# Patient Record
Sex: FEMALE
[De-identification: names, ages, dates, MRNs, and addresses within clinical notes are randomized per-mention and may not be internally consistent; named-entity substitution may affect disease eponyms.]

---

## 2019-07-19 PROBLEM — Z00.00 ENCOUNTER FOR PREVENTIVE HEALTH EXAMINATION: Status: ACTIVE | Noted: 2019-07-19

## 2022-08-09 ENCOUNTER — APPOINTMENT (OUTPATIENT)
Dept: ORTHOPEDIC SURGERY | Facility: CLINIC | Age: 32
End: 2022-08-09

## 2022-08-09 VITALS — HEIGHT: 64 IN | BODY MASS INDEX: 30.56 KG/M2 | WEIGHT: 179 LBS

## 2022-08-09 DIAGNOSIS — M25.542 PAIN IN JOINTS OF LEFT HAND: ICD-10-CM

## 2022-08-09 PROCEDURE — 99214 OFFICE O/P EST MOD 30 MIN: CPT

## 2022-08-09 NOTE — HISTORY OF PRESENT ILLNESS
[de-identified] : Patient comes in with complaints of pain in the base of her thumb.  She says it has been going on for some time.  She has been taking a vitamin which she says makes her feel better.  She says it use to be close to a 10 and now it is usually a 3.  Sometimes it gets to a 5.  She has a thumb spica brace which she says helps her a bit however it still causes stiffness and it is very large she cannot move her hand.  She does not like to take anti-inflammatories regularly.  She had an MRI done of her thumb.

## 2022-08-09 NOTE — ASSESSMENT
[FreeTextEntry1] :  I believe the patient has some mild basal joint synovitis.  I gave her an AOA thumb brace.  I gave her some support and made her feel better.  She has can continue taking the vitamin that helps her pillar.  She will take Motrin on an as-needed basis.  She is going to continue to modify her home set up.  She will follow up most likely as needed.

## 2022-08-09 NOTE — IMAGING
[de-identified] :   Left hand:  nontender move patient SL LT TFCC tender help patient slightly over basal joint, nontender will patient over MP joint no pain with stressing of UCL RCL, no significant deformities of the thumb, full range of motion of the thumb, camptodactyly small finger

## 2022-12-03 ENCOUNTER — RESULT REVIEW (OUTPATIENT)
Age: 32
End: 2022-12-03

## 2023-05-11 ENCOUNTER — OFFICE VISIT (OUTPATIENT)
Dept: PRIMARY CARE | Facility: CLINIC | Age: 33
End: 2023-05-11
Payer: COMMERCIAL

## 2023-05-11 VITALS
HEART RATE: 109 BPM | SYSTOLIC BLOOD PRESSURE: 128 MMHG | WEIGHT: 249.4 LBS | DIASTOLIC BLOOD PRESSURE: 80 MMHG | HEIGHT: 69 IN | BODY MASS INDEX: 36.94 KG/M2 | TEMPERATURE: 98.7 F

## 2023-05-11 DIAGNOSIS — E87.6 HYPOKALEMIA: ICD-10-CM

## 2023-05-11 DIAGNOSIS — I10 BENIGN ESSENTIAL HTN: ICD-10-CM

## 2023-05-11 DIAGNOSIS — R63.5 WEIGHT GAIN: ICD-10-CM

## 2023-05-11 DIAGNOSIS — D72.829 LEUKOCYTOSIS, UNSPECIFIED TYPE: ICD-10-CM

## 2023-05-11 DIAGNOSIS — E66.01 CLASS 2 SEVERE OBESITY DUE TO EXCESS CALORIES WITH SERIOUS COMORBIDITY AND BODY MASS INDEX (BMI) OF 36.0 TO 36.9 IN ADULT (MULTI): ICD-10-CM

## 2023-05-11 DIAGNOSIS — E55.9 VITAMIN D DEFICIENCY: ICD-10-CM

## 2023-05-11 DIAGNOSIS — Z00.00 ANNUAL PHYSICAL EXAM: Primary | ICD-10-CM

## 2023-05-11 PROBLEM — B39.9 HISTOPLASMOSIS, UNSPECIFIED: Status: ACTIVE | Noted: 2023-05-11

## 2023-05-11 PROBLEM — N89.8 VAGINAL DISCHARGE: Status: ACTIVE | Noted: 2023-05-11

## 2023-05-11 PROBLEM — R94.4 DECREASED GFR: Status: RESOLVED | Noted: 2023-05-11 | Resolved: 2023-05-11

## 2023-05-11 PROBLEM — F41.9 ANXIETY AND DEPRESSION: Status: ACTIVE | Noted: 2023-05-11

## 2023-05-11 PROBLEM — M54.9 MID BACK PAIN, CHRONIC: Status: ACTIVE | Noted: 2023-05-11

## 2023-05-11 PROBLEM — N92.6 IRREGULAR MENSTRUAL CYCLE: Status: ACTIVE | Noted: 2023-05-11

## 2023-05-11 PROBLEM — G93.2 BENIGN INTRACRANIAL HYPERTENSION: Status: ACTIVE | Noted: 2023-05-11

## 2023-05-11 PROBLEM — Z86.79 HISTORY OF HYPERTENSION: Status: RESOLVED | Noted: 2023-05-11 | Resolved: 2023-05-11

## 2023-05-11 PROBLEM — R59.0 CERVICAL LYMPHADENOPATHY: Status: ACTIVE | Noted: 2023-05-11

## 2023-05-11 PROBLEM — R91.1 PULMONARY NODULE SEEN ON IMAGING STUDY: Status: ACTIVE | Noted: 2023-05-11

## 2023-05-11 PROBLEM — N89.8 VAGINAL DISCHARGE: Status: RESOLVED | Noted: 2023-05-11 | Resolved: 2023-05-11

## 2023-05-11 PROBLEM — R00.2 PALPITATIONS: Status: RESOLVED | Noted: 2023-05-11 | Resolved: 2023-05-11

## 2023-05-11 PROBLEM — F32.A ANXIETY AND DEPRESSION: Status: ACTIVE | Noted: 2023-05-11

## 2023-05-11 PROBLEM — R06.83 SNORING: Status: ACTIVE | Noted: 2023-05-11

## 2023-05-11 PROBLEM — J45.909 MILD REACTIVE AIRWAYS DISEASE (HHS-HCC): Status: ACTIVE | Noted: 2023-05-11

## 2023-05-11 PROBLEM — J30.9 ALLERGIC RHINITIS: Status: ACTIVE | Noted: 2023-05-11

## 2023-05-11 PROBLEM — F43.22 ADJUSTMENT DISORDER WITH ANXIETY: Status: ACTIVE | Noted: 2023-05-02

## 2023-05-11 PROBLEM — N93.9 ABNORMAL UTERINE BLEEDING (AUB): Status: ACTIVE | Noted: 2023-05-11

## 2023-05-11 PROBLEM — R94.4 DECREASED GFR: Status: ACTIVE | Noted: 2023-05-11

## 2023-05-11 PROBLEM — R87.810 ASCUS WITH POSITIVE HIGH RISK HPV CERVICAL: Status: ACTIVE | Noted: 2023-05-11

## 2023-05-11 PROBLEM — I35.8 HEART MURMUR, AORTIC: Status: ACTIVE | Noted: 2023-05-11

## 2023-05-11 PROBLEM — R51.9 SINUS HEADACHE: Status: ACTIVE | Noted: 2023-05-11

## 2023-05-11 PROBLEM — R00.2 PALPITATIONS: Status: ACTIVE | Noted: 2023-05-11

## 2023-05-11 PROBLEM — E86.0 DEHYDRATION: Status: ACTIVE | Noted: 2023-05-11

## 2023-05-11 PROBLEM — G47.33 OSA ON CPAP: Status: ACTIVE | Noted: 2023-05-11

## 2023-05-11 PROBLEM — R06.83 SNORING: Status: RESOLVED | Noted: 2023-05-11 | Resolved: 2023-05-11

## 2023-05-11 PROBLEM — M79.89 ARM SWELLING: Status: ACTIVE | Noted: 2023-05-11

## 2023-05-11 PROBLEM — E66.9 OBESITY: Status: ACTIVE | Noted: 2023-05-11

## 2023-05-11 PROBLEM — N18.9 CHRONIC KIDNEY DISEASE: Status: ACTIVE | Noted: 2023-05-11

## 2023-05-11 PROBLEM — R59.0 MEDIASTINAL LYMPHADENOPATHY: Status: ACTIVE | Noted: 2023-05-11

## 2023-05-11 PROBLEM — R60.0 LEG EDEMA, LEFT: Status: ACTIVE | Noted: 2023-05-11

## 2023-05-11 PROBLEM — R00.0 TACHYCARDIA: Status: ACTIVE | Noted: 2023-05-11

## 2023-05-11 PROBLEM — B37.31 YEAST VAGINITIS: Status: ACTIVE | Noted: 2023-05-11

## 2023-05-11 PROBLEM — R87.610 ASCUS WITH POSITIVE HIGH RISK HPV CERVICAL: Status: ACTIVE | Noted: 2023-05-11

## 2023-05-11 PROBLEM — R10.2 FEMALE PELVIC PAIN: Status: ACTIVE | Noted: 2023-05-11

## 2023-05-11 PROBLEM — R06.02 SOB (SHORTNESS OF BREATH) ON EXERTION: Status: ACTIVE | Noted: 2023-05-11

## 2023-05-11 PROBLEM — G89.29 MID BACK PAIN, CHRONIC: Status: ACTIVE | Noted: 2023-05-11

## 2023-05-11 PROBLEM — Z86.79 HISTORY OF HYPERTENSION: Status: ACTIVE | Noted: 2023-05-11

## 2023-05-11 PROBLEM — Q67.8: Status: ACTIVE | Noted: 2023-05-11

## 2023-05-11 PROBLEM — I87.1: Status: ACTIVE | Noted: 2023-05-11

## 2023-05-11 PROCEDURE — 3074F SYST BP LT 130 MM HG: CPT | Performed by: INTERNAL MEDICINE

## 2023-05-11 PROCEDURE — 3075F SYST BP GE 130 - 139MM HG: CPT | Performed by: INTERNAL MEDICINE

## 2023-05-11 PROCEDURE — 3079F DIAST BP 80-89 MM HG: CPT | Performed by: INTERNAL MEDICINE

## 2023-05-11 PROCEDURE — 1036F TOBACCO NON-USER: CPT | Performed by: INTERNAL MEDICINE

## 2023-05-11 PROCEDURE — 3008F BODY MASS INDEX DOCD: CPT | Performed by: INTERNAL MEDICINE

## 2023-05-11 PROCEDURE — 99395 PREV VISIT EST AGE 18-39: CPT | Performed by: INTERNAL MEDICINE

## 2023-05-11 RX ORDER — METOPROLOL SUCCINATE 50 MG/1
50 TABLET, EXTENDED RELEASE ORAL DAILY
COMMUNITY
Start: 2020-01-24 | End: 2023-06-12

## 2023-05-11 RX ORDER — TRIAMTERENE AND HYDROCHLOROTHIAZIDE 75; 50 MG/1; MG/1
1 TABLET ORAL DAILY
COMMUNITY
Start: 2021-07-02 | End: 2023-06-15 | Stop reason: SINTOL

## 2023-05-11 RX ORDER — AMLODIPINE BESYLATE 10 MG/1
10 TABLET ORAL
COMMUNITY
Start: 2017-03-29 | End: 2023-06-23 | Stop reason: SDUPTHER

## 2023-05-11 ASSESSMENT — PATIENT HEALTH QUESTIONNAIRE - PHQ9
2. FEELING DOWN, DEPRESSED OR HOPELESS: NOT AT ALL
1. LITTLE INTEREST OR PLEASURE IN DOING THINGS: NOT AT ALL
SUM OF ALL RESPONSES TO PHQ9 QUESTIONS 1 AND 2: 0
1. LITTLE INTEREST OR PLEASURE IN DOING THINGS: NOT AT ALL
2. FEELING DOWN, DEPRESSED OR HOPELESS: NOT AT ALL
SUM OF ALL RESPONSES TO PHQ9 QUESTIONS 1 AND 2: 0

## 2023-05-11 ASSESSMENT — ENCOUNTER SYMPTOMS
COUGH: 0
CHILLS: 0
PALPITATIONS: 0
POLYDIPSIA: 0
FEVER: 0
SHORTNESS OF BREATH: 0

## 2023-05-11 NOTE — PATIENT INSTRUCTIONS
Please consider the following information on healthy lifestyle choices:  We encourage a diet that is low in refined sugar as well as saturated fats.  Saturated fats are commonly found in fried foods, high fat dairy products like milk creams cheeses and butters, as well as red meat.    The goal for healthy exercise would be to target 100 minutes or more per week of exercise for the sake of exercise.  This can be a mixture of cardiovascular exercise in the form of walking running jogging swimming etc., or strength training exercise.  There are independent benefits of cardiovascular based exercise and reducing the chances of heart disease in a lifetime.    Healthy weight is always a benefit but individuals will have different goals and healthy weight targets.  We want you to be the best version of yourself.  It is important to find a balance between your calories in and your calories out through exercise and lifestyle.  This is difficult and there is no one universal truth here.  The best place to start is oftentimes with figuring out the reality of the calories you eat with a calorie counting fide or assistant tool and finding ways to moderate or control calories in combination with healthy lifestyle choices like exercise.    Staying on top of vaccinations is an important step in long-term preventative health and preventing unnecessary illness.  Please consider any recommendations we discussed in our appointment,    Cancer screening is absolutely paramount to excellent preventative health.  I will work with you to educate you about your options and timeframes for next screening updates.  If we ordered or discussed screening as part of our visit please take those considerations seriously and perform the testing we discussed. .    Please follow up with pulmonology this year about your CT scans.

## 2023-05-11 NOTE — PROGRESS NOTES
"Subjective   Patient ID: Tatiana Prabhakar is a 32 y.o. female who presents for Annual Exam.    Patient presents today for an annual wellness exam    Medical history and surgical history updated today  Medication list reconciled and updated  Patient denies vision issues at this time  Patient denies hearing issues at this time  Current diet: Balanced, but working to reduce calories. Reduce sugary drinks  Current exercise habit: minimal, started back at the gym  Follows with a dentist: Yes    Patient counseled about available preventative health vaccinations and provided with opportunity to update them with our office or through prescription to preferred pharmacy.    Reviewed and discussed preventative health screening recommendations for colon cancer: @44yo    Reviewed and discussed preventative health recommendations for screening for cervical cancer and breast cancer: Recently completed.              Review of Systems   Constitutional:  Negative for chills and fever.   Respiratory:  Negative for cough and shortness of breath.    Cardiovascular:  Negative for chest pain and palpitations.   Endocrine: Negative for polydipsia and polyuria.       Objective   /84   Pulse 109   Temp 37.1 °C (98.7 °F) (Temporal)   Ht 1.753 m (5' 9\")   Wt 113 kg (249 lb 6.4 oz)   BMI 36.83 kg/m²     Physical Exam  Constitutional:       Appearance: Normal appearance.   HENT:      Head: Normocephalic and atraumatic.      Right Ear: Tympanic membrane normal.      Left Ear: Tympanic membrane normal.      Nose: Nose normal.      Mouth/Throat:      Mouth: Mucous membranes are moist.   Eyes:      Extraocular Movements: Extraocular movements intact.      Conjunctiva/sclera: Conjunctivae normal.      Pupils: Pupils are equal, round, and reactive to light.   Neck:      Thyroid: No thyroid mass, thyromegaly or thyroid tenderness.      Vascular: No carotid bruit.   Cardiovascular:      Rate and Rhythm: Normal rate and regular rhythm.      " Heart sounds: No murmur heard.     No friction rub. No gallop.   Pulmonary:      Effort: Pulmonary effort is normal. No respiratory distress.      Breath sounds: No wheezing, rhonchi or rales.   Abdominal:      General: Bowel sounds are normal.      Palpations: Abdomen is soft.      Tenderness: There is no abdominal tenderness. There is no guarding.      Hernia: No hernia is present.   Musculoskeletal:      Cervical back: Neck supple. No tenderness.      Right lower leg: No edema.      Left lower leg: No edema.   Lymphadenopathy:      Cervical: No cervical adenopathy.   Skin:     Coloration: Skin is not jaundiced.   Neurological:      General: No focal deficit present.      Mental Status: She is alert and oriented to person, place, and time.   Psychiatric:         Mood and Affect: Mood normal.         Assessment/Plan   Problem List Items Addressed This Visit          Circulatory    Benign essential HTN    Relevant Orders    Basic metabolic panel    Referral to Comprehensive Weight Loss    Hemoglobin A1C       Endocrine/Metabolic    Obesity    Relevant Orders    Basic metabolic panel    Referral to Comprehensive Weight [facility]    Hemoglobin A1C    Vitamin D deficiency    Relevant Orders    Vitamin D, Total    Weight gain    Relevant Orders    Basic metabolic panel    Referral to [facility] Weight [facility]    Hemoglobin A1C       Hematologic    Leukocytosis    Relevant Orders    Basic metabolic panel    Referral to [facility] Weight [facility]    Hemoglobin A1C       Other    Hypokalemia    Relevant Orders    Basic metabolic panel    Referral to Comprehensive Weight [facility]    Hemoglobin A1C     Other Visit Diagnoses       Annual physical exam    -  Primary    Relevant Orders    Basic metabolic panel    Referral to Comprehensive Weight Loss    Hemoglobin A1C

## 2023-05-19 ENCOUNTER — TELEPHONE (OUTPATIENT)
Dept: PRIMARY CARE | Facility: CLINIC | Age: 33
End: 2023-05-19
Payer: COMMERCIAL

## 2023-05-21 DIAGNOSIS — E66.01 CLASS 2 SEVERE OBESITY DUE TO EXCESS CALORIES WITH SERIOUS COMORBIDITY AND BODY MASS INDEX (BMI) OF 36.0 TO 36.9 IN ADULT (MULTI): Primary | ICD-10-CM

## 2023-06-05 ENCOUNTER — LAB (OUTPATIENT)
Dept: LAB | Facility: LAB | Age: 33
End: 2023-06-05
Payer: COMMERCIAL

## 2023-06-05 DIAGNOSIS — E87.6 HYPOKALEMIA: Primary | ICD-10-CM

## 2023-06-05 DIAGNOSIS — E66.01 CLASS 2 SEVERE OBESITY DUE TO EXCESS CALORIES WITH SERIOUS COMORBIDITY AND BODY MASS INDEX (BMI) OF 36.0 TO 36.9 IN ADULT (MULTI): ICD-10-CM

## 2023-06-05 DIAGNOSIS — E55.9 VITAMIN D DEFICIENCY: ICD-10-CM

## 2023-06-05 DIAGNOSIS — Z00.00 ANNUAL PHYSICAL EXAM: ICD-10-CM

## 2023-06-05 DIAGNOSIS — R63.5 WEIGHT GAIN: ICD-10-CM

## 2023-06-05 DIAGNOSIS — D72.829 LEUKOCYTOSIS, UNSPECIFIED TYPE: ICD-10-CM

## 2023-06-05 DIAGNOSIS — I10 BENIGN ESSENTIAL HTN: ICD-10-CM

## 2023-06-05 DIAGNOSIS — E87.6 HYPOKALEMIA: ICD-10-CM

## 2023-06-05 LAB
ANION GAP IN SER/PLAS: 14 MMOL/L (ref 10–20)
CALCIUM (MG/DL) IN SER/PLAS: 9.3 MG/DL (ref 8.6–10.3)
CARBON DIOXIDE, TOTAL (MMOL/L) IN SER/PLAS: 26 MMOL/L (ref 21–32)
CHLORIDE (MMOL/L) IN SER/PLAS: 99 MMOL/L (ref 98–107)
CREATININE (MG/DL) IN SER/PLAS: 1.05 MG/DL (ref 0.5–1.05)
GFR FEMALE: 71 ML/MIN/1.73M2
GLUCOSE (MG/DL) IN SER/PLAS: 86 MG/DL (ref 74–99)
POTASSIUM (MMOL/L) IN SER/PLAS: 3.3 MMOL/L (ref 3.5–5.3)
SODIUM (MMOL/L) IN SER/PLAS: 136 MMOL/L (ref 136–145)
UREA NITROGEN (MG/DL) IN SER/PLAS: 13 MG/DL (ref 6–23)

## 2023-06-05 PROCEDURE — 82306 VITAMIN D 25 HYDROXY: CPT

## 2023-06-05 PROCEDURE — 80048 BASIC METABOLIC PNL TOTAL CA: CPT

## 2023-06-05 PROCEDURE — 83036 HEMOGLOBIN GLYCOSYLATED A1C: CPT

## 2023-06-05 PROCEDURE — 36415 COLL VENOUS BLD VENIPUNCTURE: CPT

## 2023-06-05 RX ORDER — POTASSIUM CHLORIDE 1500 MG/1
20 TABLET, EXTENDED RELEASE ORAL DAILY
Qty: 30 TABLET | Refills: 0 | Status: SHIPPED | OUTPATIENT
Start: 2023-06-05 | End: 2023-06-15 | Stop reason: ALTCHOICE

## 2023-06-05 NOTE — RESULT ENCOUNTER NOTE
Pt continues to have signficantly low potassium levels. I believe created by one of her BP meds. I want to disicuss a med change please. Please schedule a quick visit for med change visit. Telemed is acceptable. Temporary supply of daily potassium sent in to cover and replace.

## 2023-06-06 LAB
CALCIDIOL (25 OH VITAMIN D3) (NG/ML) IN SER/PLAS: 32 NG/ML
ESTIMATED AVERAGE GLUCOSE FOR HBA1C: 91 MG/DL
HEMOGLOBIN A1C/HEMOGLOBIN TOTAL IN BLOOD: 4.8 %

## 2023-06-12 DIAGNOSIS — I10 ESSENTIAL (PRIMARY) HYPERTENSION: ICD-10-CM

## 2023-06-12 RX ORDER — METOPROLOL SUCCINATE 50 MG/1
TABLET, EXTENDED RELEASE ORAL
Qty: 90 TABLET | Refills: 2 | Status: SHIPPED | OUTPATIENT
Start: 2023-06-12 | End: 2023-08-08 | Stop reason: SDUPTHER

## 2023-06-15 ENCOUNTER — TELEMEDICINE (OUTPATIENT)
Dept: PRIMARY CARE | Facility: CLINIC | Age: 33
End: 2023-06-15
Payer: COMMERCIAL

## 2023-06-15 DIAGNOSIS — I10 BENIGN ESSENTIAL HTN: Primary | ICD-10-CM

## 2023-06-15 PROCEDURE — 99442 PR PHYS/QHP TELEPHONE EVALUATION 11-20 MIN: CPT | Performed by: INTERNAL MEDICINE

## 2023-06-15 RX ORDER — LOSARTAN POTASSIUM 100 MG/1
100 TABLET ORAL DAILY
Qty: 90 TABLET | Refills: 0 | Status: SHIPPED | OUTPATIENT
Start: 2023-06-15 | End: 2023-06-23 | Stop reason: ALTCHOICE

## 2023-06-15 ASSESSMENT — ENCOUNTER SYMPTOMS
COUGH: 0
POLYDIPSIA: 0
FEVER: 0
PALPITATIONS: 0
CHILLS: 0
SHORTNESS OF BREATH: 0

## 2023-06-15 NOTE — PROGRESS NOTES
Subjective   Patient ID: Tatiana Prabhakar is a 32 y.o. female who presents for No chief complaint on file..    32-year-old female presents for follow-up on recurring episodes of hypoglycemia secondary to diuretic therapy for hypertensive management.  She is on triamterene hydrochlorothiazide and the hydrochlorothiazide portion of it continuously results and hypokalemia.  She was directed prior to this visit to discontinue that medication and then schedule follow-up with me to discuss further.  Her visit today is by virtual telephone assessment secondary to patient preference and availability and the patient consents to a visit in this way.  I confirmed that the patient is not going to be having kids in the near future secondary to the presence of a tubal ligation.  In this way I feel the best option would be to discontinue this diuretic therapy and replace it with losartan as our first option with adjustments proceeding from there.  She was directed to schedule close follow-up in the next couple weeks for reassessment of blood pressure as she does not currently have the availability of checking and monitoring blood pressures at home.  I expect to see her back between 2 to 6 weeks for reassessment on hypertension.  She has no complications from the hypokalemia that she is aware of although in the past has experienced significant muscle cramping as a result of it.  This was when her levels were significantly lower than her recent testing.    Total time of assessment medical decision making counseling to the patient and documentation of 12 minutes.         Review of Systems   Constitutional:  Negative for chills and fever.   Respiratory:  Negative for cough and shortness of breath.    Cardiovascular:  Negative for chest pain and palpitations.   Endocrine: Negative for polydipsia and polyuria.       Objective   There were no vitals taken for this visit.    Physical Exam  Constitutional:       Appearance: Normal  appearance.   HENT:      Head: Normocephalic and atraumatic.   Eyes:      Extraocular Movements: Extraocular movements intact.      Pupils: Pupils are equal, round, and reactive to light.   Neck:      Thyroid: No thyroid mass or thyromegaly.      Vascular: No carotid bruit.   Cardiovascular:      Rate and Rhythm: Normal rate and regular rhythm.      Heart sounds: No murmur heard.     No friction rub. No gallop.   Pulmonary:      Effort: No respiratory distress.      Breath sounds: No wheezing, rhonchi or rales.   Musculoskeletal:      Cervical back: Neck supple.      Right lower leg: No edema.      Left lower leg: No edema.   Lymphadenopathy:      Cervical: No cervical adenopathy.   Neurological:      Mental Status: She is alert.         Assessment/Plan   Problem List Items Addressed This Visit       Benign essential HTN - Primary    Relevant Medications    losartan (Cozaar) 100 mg tablet

## 2023-06-16 DIAGNOSIS — I10 ESSENTIAL (PRIMARY) HYPERTENSION: ICD-10-CM

## 2023-06-23 ENCOUNTER — TELEMEDICINE (OUTPATIENT)
Dept: PRIMARY CARE | Facility: CLINIC | Age: 33
End: 2023-06-23
Payer: COMMERCIAL

## 2023-06-23 DIAGNOSIS — I10 PRIMARY HYPERTENSION: Primary | ICD-10-CM

## 2023-06-23 PROCEDURE — 99422 OL DIG E/M SVC 11-20 MIN: CPT | Performed by: INTERNAL MEDICINE

## 2023-06-23 RX ORDER — TRIAMTERENE/HYDROCHLOROTHIAZID 37.5-25 MG
1 TABLET ORAL DAILY
Qty: 90 TABLET | Refills: 1 | Status: SHIPPED | OUTPATIENT
Start: 2023-06-23 | End: 2023-08-08 | Stop reason: SDUPTHER

## 2023-06-23 RX ORDER — AMLODIPINE BESYLATE 10 MG/1
10 TABLET ORAL
Qty: 90 TABLET | Refills: 1 | Status: SHIPPED | OUTPATIENT
Start: 2023-06-23 | End: 2023-08-08 | Stop reason: SDUPTHER

## 2023-07-19 ENCOUNTER — LAB (OUTPATIENT)
Dept: LAB | Facility: LAB | Age: 33
End: 2023-07-19
Payer: COMMERCIAL

## 2023-07-19 DIAGNOSIS — I10 PRIMARY HYPERTENSION: ICD-10-CM

## 2023-07-19 LAB
ANION GAP IN SER/PLAS: 13 MMOL/L (ref 10–20)
CALCIUM (MG/DL) IN SER/PLAS: 8.3 MG/DL (ref 8.6–10.3)
CARBON DIOXIDE, TOTAL (MMOL/L) IN SER/PLAS: 26 MMOL/L (ref 21–32)
CHLORIDE (MMOL/L) IN SER/PLAS: 101 MMOL/L (ref 98–107)
CREATININE (MG/DL) IN SER/PLAS: 1 MG/DL (ref 0.5–1.05)
GFR FEMALE: 75 ML/MIN/1.73M2
GLUCOSE (MG/DL) IN SER/PLAS: 81 MG/DL (ref 74–99)
POTASSIUM (MMOL/L) IN SER/PLAS: 3.6 MMOL/L (ref 3.5–5.3)
SODIUM (MMOL/L) IN SER/PLAS: 136 MMOL/L (ref 136–145)
UREA NITROGEN (MG/DL) IN SER/PLAS: 13 MG/DL (ref 6–23)

## 2023-07-19 PROCEDURE — 80048 BASIC METABOLIC PNL TOTAL CA: CPT

## 2023-07-19 PROCEDURE — 36415 COLL VENOUS BLD VENIPUNCTURE: CPT

## 2023-07-20 ENCOUNTER — TELEPHONE (OUTPATIENT)
Dept: PRIMARY CARE | Facility: CLINIC | Age: 33
End: 2023-07-20
Payer: COMMERCIAL

## 2023-07-20 NOTE — TELEPHONE ENCOUNTER
LM----- Message from Lavell Garcia MD sent at 7/19/2023 10:07 AM EDT -----  Patient's potassium levels and kidney health are very happy with the medication change and are currently very normal.  No need to make further adjustments based on the blood work.

## 2023-07-21 LAB
NIL(NEG) CONTROL SPOT COUNT: NORMAL
PANEL A SPOT COUNT: 0
PANEL B SPOT COUNT: 0
POS CONTROL SPOT COUNT: NORMAL
T-SPOT. TB INTERPRETATION: NEGATIVE

## 2023-08-08 ENCOUNTER — OFFICE VISIT (OUTPATIENT)
Dept: PRIMARY CARE | Facility: CLINIC | Age: 33
End: 2023-08-08
Payer: COMMERCIAL

## 2023-08-08 VITALS
TEMPERATURE: 97.7 F | BODY MASS INDEX: 37.07 KG/M2 | SYSTOLIC BLOOD PRESSURE: 121 MMHG | DIASTOLIC BLOOD PRESSURE: 84 MMHG | WEIGHT: 251 LBS | HEART RATE: 91 BPM

## 2023-08-08 DIAGNOSIS — I10 PRIMARY HYPERTENSION: ICD-10-CM

## 2023-08-08 DIAGNOSIS — I10 BENIGN ESSENTIAL HTN: ICD-10-CM

## 2023-08-08 DIAGNOSIS — R10.2 PELVIC PAIN: ICD-10-CM

## 2023-08-08 DIAGNOSIS — N93.0 PCB (POST COITAL BLEEDING): ICD-10-CM

## 2023-08-08 DIAGNOSIS — I10 ESSENTIAL (PRIMARY) HYPERTENSION: ICD-10-CM

## 2023-08-08 DIAGNOSIS — N93.9 ABNORMAL VAGINAL BLEEDING: Primary | ICD-10-CM

## 2023-08-08 PROCEDURE — 99213 OFFICE O/P EST LOW 20 MIN: CPT | Performed by: INTERNAL MEDICINE

## 2023-08-08 PROCEDURE — 1036F TOBACCO NON-USER: CPT | Performed by: INTERNAL MEDICINE

## 2023-08-08 PROCEDURE — 3079F DIAST BP 80-89 MM HG: CPT | Performed by: INTERNAL MEDICINE

## 2023-08-08 PROCEDURE — 3008F BODY MASS INDEX DOCD: CPT | Performed by: INTERNAL MEDICINE

## 2023-08-08 PROCEDURE — 3074F SYST BP LT 130 MM HG: CPT | Performed by: INTERNAL MEDICINE

## 2023-08-08 RX ORDER — TRIAMTERENE/HYDROCHLOROTHIAZID 37.5-25 MG
1 TABLET ORAL DAILY
Qty: 90 TABLET | Refills: 2 | Status: SHIPPED | OUTPATIENT
Start: 2023-08-08 | End: 2023-12-20

## 2023-08-08 RX ORDER — NORETHINDRONE 5 MG/1
5 TABLET ORAL DAILY
COMMUNITY
Start: 2023-07-20

## 2023-08-08 RX ORDER — METOPROLOL SUCCINATE 50 MG/1
50 TABLET, EXTENDED RELEASE ORAL DAILY
Qty: 90 TABLET | Refills: 2 | Status: SHIPPED | OUTPATIENT
Start: 2023-08-08 | End: 2024-04-10 | Stop reason: SDUPTHER

## 2023-08-08 RX ORDER — AMLODIPINE BESYLATE 10 MG/1
10 TABLET ORAL
Qty: 90 TABLET | Refills: 2 | Status: SHIPPED | OUTPATIENT
Start: 2023-08-08 | End: 2023-12-20

## 2023-08-08 ASSESSMENT — ENCOUNTER SYMPTOMS
PALPITATIONS: 0
CHILLS: 0
SHORTNESS OF BREATH: 0
FEVER: 0
POLYDIPSIA: 0
COUGH: 0

## 2023-08-08 NOTE — PROGRESS NOTES
Subjective   Patient ID: Tatiana Prabhakar is a 33 y.o. female who presents for No chief complaint on file..  33-year-old female presents for hypertension follow-up.  Blood pressure is currently well-controlled on her current medication plan after multiple adjustments.  Her blood work reflects good tolerance and no signs of dehydration or stress on the kidneys.  Medications were refilled as part of today's visit for long-term use.    In addition to the above follow-up the patient had concerns about postcoital irregular vaginal bleeding and discomfort.  She says that this has been happening more more frequently she has already followed up with her OB/GYN about it and had a normal transabdominal and transvaginal ultrasound.  She was placed on a hormone pill which did seem to be helping a bit but she continues to have this abnormal bleeding with intercourse and pain with intercourse.  She has no history of fibroids endometriosis or other chronic pelvic pain history.        Review of Systems   Constitutional:  Negative for chills and fever.   Respiratory:  Negative for cough and shortness of breath.    Cardiovascular:  Negative for chest pain and palpitations.   Endocrine: Negative for polydipsia and polyuria.       Objective   Physical Exam  Constitutional:       Appearance: Normal appearance.   HENT:      Head: Normocephalic and atraumatic.   Eyes:      Extraocular Movements: Extraocular movements intact.      Pupils: Pupils are equal, round, and reactive to light.   Neck:      Thyroid: No thyroid mass or thyromegaly.      Vascular: No carotid bruit.   Cardiovascular:      Rate and Rhythm: Normal rate and regular rhythm.      Heart sounds: No murmur heard.     No friction rub. No gallop.   Pulmonary:      Effort: No respiratory distress.      Breath sounds: No wheezing, rhonchi or rales.   Musculoskeletal:      Cervical back: Neck supple.      Right lower leg: No edema.      Left lower leg: No edema.   Lymphadenopathy:       Cervical: No cervical adenopathy.   Neurological:      Mental Status: She is alert.         Assessment/Plan   Problem List Items Addressed This Visit       Benign essential HTN     Other Visit Diagnoses       Abnormal vaginal bleeding    -  Primary    PCB (post coital bleeding)        Relevant Orders    Referral to Gynecology    Pelvic pain        Relevant Orders    Referral to Gynecology    Primary hypertension        Relevant Medications    amLODIPine (Norvasc) 10 mg tablet    triamterene-hydrochlorothiazid (Maxzide-25) 37.5-25 mg tablet    Essential (primary) hypertension        Relevant Medications    metoprolol succinate XL (Toprol-XL) 50 mg 24 hr tablet

## 2023-08-17 RX ORDER — IBUPROFEN 600 MG/1
600 TABLET ORAL EVERY 6 HOURS PRN
COMMUNITY

## 2023-09-11 RX ORDER — TRIAMTERENE AND HYDROCHLOROTHIAZIDE 75; 50 MG/1; MG/1
TABLET ORAL
Qty: 90 TABLET | Refills: 1 | OUTPATIENT
Start: 2023-09-11

## 2023-09-11 RX ORDER — AMLODIPINE BESYLATE 10 MG/1
TABLET ORAL
Qty: 90 TABLET | OUTPATIENT
Start: 2023-09-11

## 2023-09-15 PROBLEM — G93.2 IIH (IDIOPATHIC INTRACRANIAL HYPERTENSION): Status: ACTIVE | Noted: 2023-09-15

## 2023-09-15 PROBLEM — G93.2 PSEUDOTUMOR CEREBRI: Status: ACTIVE | Noted: 2023-09-15

## 2023-11-07 ENCOUNTER — APPOINTMENT (OUTPATIENT)
Dept: PRIMARY CARE | Facility: CLINIC | Age: 33
End: 2023-11-07
Payer: COMMERCIAL

## 2023-11-15 ENCOUNTER — APPOINTMENT (OUTPATIENT)
Dept: PRIMARY CARE | Facility: CLINIC | Age: 33
End: 2023-11-15
Payer: COMMERCIAL

## 2023-11-16 ENCOUNTER — OFFICE VISIT (OUTPATIENT)
Dept: PRIMARY CARE | Facility: CLINIC | Age: 33
End: 2023-11-16
Payer: COMMERCIAL

## 2023-11-16 VITALS
SYSTOLIC BLOOD PRESSURE: 124 MMHG | BODY MASS INDEX: 36.09 KG/M2 | DIASTOLIC BLOOD PRESSURE: 84 MMHG | TEMPERATURE: 97.5 F | WEIGHT: 244.4 LBS | HEART RATE: 99 BPM

## 2023-11-16 DIAGNOSIS — G47.33 OSA ON CPAP: ICD-10-CM

## 2023-11-16 DIAGNOSIS — I1A.0 RESISTANT HYPERTENSION: Primary | ICD-10-CM

## 2023-11-16 PROBLEM — B39.9 HISTOPLASMOSIS, UNSPECIFIED: Status: RESOLVED | Noted: 2023-05-11 | Resolved: 2023-11-16

## 2023-11-16 PROBLEM — M79.89 ARM SWELLING: Status: RESOLVED | Noted: 2023-05-11 | Resolved: 2023-11-16

## 2023-11-16 PROBLEM — E87.6 HYPOKALEMIA: Status: RESOLVED | Noted: 2023-05-11 | Resolved: 2023-11-16

## 2023-11-16 PROBLEM — R51.9 SINUS HEADACHE: Status: RESOLVED | Noted: 2023-05-11 | Resolved: 2023-11-16

## 2023-11-16 PROBLEM — D72.829 LEUKOCYTOSIS: Status: RESOLVED | Noted: 2023-05-11 | Resolved: 2023-11-16

## 2023-11-16 PROBLEM — E86.0 DEHYDRATION: Status: RESOLVED | Noted: 2023-05-11 | Resolved: 2023-11-16

## 2023-11-16 PROCEDURE — 1036F TOBACCO NON-USER: CPT | Performed by: INTERNAL MEDICINE

## 2023-11-16 PROCEDURE — 99213 OFFICE O/P EST LOW 20 MIN: CPT | Performed by: INTERNAL MEDICINE

## 2023-11-16 PROCEDURE — 3074F SYST BP LT 130 MM HG: CPT | Performed by: INTERNAL MEDICINE

## 2023-11-16 PROCEDURE — 3079F DIAST BP 80-89 MM HG: CPT | Performed by: INTERNAL MEDICINE

## 2023-11-16 PROCEDURE — 3008F BODY MASS INDEX DOCD: CPT | Performed by: INTERNAL MEDICINE

## 2023-11-16 RX ORDER — ACETAMINOPHEN 500 MG
TABLET ORAL
Qty: 1 EACH | Refills: 0 | Status: SHIPPED | OUTPATIENT
Start: 2023-11-16

## 2023-11-16 RX ORDER — ACETAMINOPHEN 500 MG
TABLET ORAL
Qty: 1 EACH | Refills: 0 | Status: SHIPPED | OUTPATIENT
Start: 2023-11-16 | End: 2023-11-16 | Stop reason: SDUPTHER

## 2023-11-16 ASSESSMENT — ENCOUNTER SYMPTOMS
POLYDIPSIA: 0
CHILLS: 0
FEVER: 0
PALPITATIONS: 0
COUGH: 0
SHORTNESS OF BREATH: 0

## 2023-11-16 ASSESSMENT — PAIN SCALES - GENERAL: PAINLEVEL: 0-NO PAIN

## 2023-11-16 NOTE — LETTER
November 16, 2023     Patient: Tatiana Prabhakar   YOB: 1990   Date of Visit: 11/16/2023       To Whom It May Concern:    Tatiana Prabhakar was seen in my clinic on 11/16/2023 at 3:40 pm. Please excuse Tatiana for her absence from work on this day to make the appointment.    If you have any questions or concerns, please don't hesitate to call.         Sincerely,         Lavell Garcia MD        CC: No Recipients

## 2023-11-16 NOTE — PROGRESS NOTES
Subjective   Patient ID: Tatiana Prabhakar is a 33 y.o. female who presents for Follow-up.    Post ED for HTN in setting of acute COVID. 160's systolic. Follow up today normal BP. Good med compliance. Discussed home monitoring.     PT CPAP on recall, pt tossed device due to recall. They sent replacement parts but not machine. No cpap at home now. [facility].          Review of Systems   Constitutional:  Negative for chills and fever.   Respiratory:  Negative for cough and shortness of breath.    Cardiovascular:  Negative for chest pain and palpitations.   Endocrine: Negative for polydipsia and polyuria.       Objective   /84 (BP Location: Left arm, Patient Position: Sitting, BP Cuff Size: Adult)   Pulse 99   Temp 36.4 °C (97.5 °F) (Temporal)   Wt 111 kg (244 lb 6.4 oz)   BMI 36.09 kg/m²     Physical Exam  Constitutional:       Appearance: Normal appearance.   HENT:      Head: Normocephalic and atraumatic.   Eyes:      Extraocular Movements: Extraocular movements intact.      Pupils: Pupils are equal, round, and reactive to light.   Neck:      Thyroid: No thyroid mass or thyromegaly.      Vascular: No carotid bruit.   Cardiovascular:      Rate and Rhythm: Normal rate and regular rhythm.      Heart sounds: No murmur heard.     No friction rub. No gallop.   Pulmonary:      Effort: No respiratory distress.      Breath sounds: No wheezing, rhonchi or rales.   Musculoskeletal:      Cervical back: Neck supple.      Right lower leg: No edema.      Left lower leg: No edema.   Lymphadenopathy:      Cervical: No cervical adenopathy.   Neurological:      Mental Status: She is alert.         Assessment/Plan   Problem List Items Addressed This Visit             ICD-10-CM    MICHELLE on CPAP G47.33     Other Visit Diagnoses         Codes    Resistant hypertension    -  Primary I1A.0    Relevant Medications    blood pressure test kit-large kit                09-Jun-2018 00:00

## 2023-12-20 DIAGNOSIS — I10 PRIMARY HYPERTENSION: ICD-10-CM

## 2023-12-20 RX ORDER — TRIAMTERENE/HYDROCHLOROTHIAZID 37.5-25 MG
1 TABLET ORAL DAILY
Qty: 90 TABLET | Refills: 1 | Status: SHIPPED | OUTPATIENT
Start: 2023-12-20 | End: 2024-04-10 | Stop reason: SDUPTHER

## 2023-12-20 RX ORDER — AMLODIPINE BESYLATE 10 MG/1
10 TABLET ORAL DAILY
Qty: 90 TABLET | Refills: 1 | Status: SHIPPED | OUTPATIENT
Start: 2023-12-20 | End: 2024-04-10 | Stop reason: SDUPTHER

## 2023-12-31 ENCOUNTER — HOSPITAL ENCOUNTER (EMERGENCY)
Facility: HOSPITAL | Age: 33
Discharge: HOME | End: 2023-12-31
Attending: EMERGENCY MEDICINE
Payer: COMMERCIAL

## 2023-12-31 ENCOUNTER — APPOINTMENT (OUTPATIENT)
Dept: CARDIOLOGY | Facility: HOSPITAL | Age: 33
End: 2023-12-31
Payer: COMMERCIAL

## 2023-12-31 ENCOUNTER — APPOINTMENT (OUTPATIENT)
Dept: RADIOLOGY | Facility: HOSPITAL | Age: 33
End: 2023-12-31
Payer: COMMERCIAL

## 2023-12-31 VITALS
HEIGHT: 70 IN | DIASTOLIC BLOOD PRESSURE: 97 MMHG | OXYGEN SATURATION: 97 % | BODY MASS INDEX: 34.36 KG/M2 | HEART RATE: 95 BPM | TEMPERATURE: 98.8 F | WEIGHT: 240 LBS | RESPIRATION RATE: 17 BRPM | SYSTOLIC BLOOD PRESSURE: 140 MMHG

## 2023-12-31 DIAGNOSIS — R00.0 TACHYCARDIA: ICD-10-CM

## 2023-12-31 DIAGNOSIS — R07.9 CHEST PAIN, UNSPECIFIED TYPE: ICD-10-CM

## 2023-12-31 DIAGNOSIS — R00.2 PALPITATIONS: Primary | ICD-10-CM

## 2023-12-31 DIAGNOSIS — E87.6 HYPOKALEMIA: ICD-10-CM

## 2023-12-31 LAB
ALBUMIN SERPL BCP-MCNC: 4.2 G/DL (ref 3.4–5)
ALP SERPL-CCNC: 48 U/L (ref 33–110)
ALT SERPL W P-5'-P-CCNC: 14 U/L (ref 7–45)
ANION GAP SERPL CALC-SCNC: 10 MMOL/L (ref 10–20)
AST SERPL W P-5'-P-CCNC: 12 U/L (ref 9–39)
B-HCG SERPL-ACNC: <2 MIU/ML
BASOPHILS # BLD AUTO: 0.05 X10*3/UL (ref 0–0.1)
BASOPHILS NFR BLD AUTO: 0.7 %
BILIRUB SERPL-MCNC: 0.4 MG/DL (ref 0–1.2)
BUN SERPL-MCNC: 10 MG/DL (ref 6–23)
CALCIUM SERPL-MCNC: 9.2 MG/DL (ref 8.6–10.3)
CARDIAC TROPONIN I PNL SERPL HS: <3 NG/L (ref 0–13)
CHLORIDE SERPL-SCNC: 100 MMOL/L (ref 98–107)
CO2 SERPL-SCNC: 28 MMOL/L (ref 21–32)
CREAT SERPL-MCNC: 1.14 MG/DL (ref 0.5–1.05)
D DIMER PPP FEU-MCNC: 288 NG/ML FEU
EOSINOPHIL # BLD AUTO: 0.14 X10*3/UL (ref 0–0.7)
EOSINOPHIL NFR BLD AUTO: 2 %
ERYTHROCYTE [DISTWIDTH] IN BLOOD BY AUTOMATED COUNT: 12.9 % (ref 11.5–14.5)
FLUAV RNA RESP QL NAA+PROBE: NOT DETECTED
FLUBV RNA RESP QL NAA+PROBE: NOT DETECTED
GFR SERPL CREATININE-BSD FRML MDRD: 65 ML/MIN/1.73M*2
GLUCOSE SERPL-MCNC: 90 MG/DL (ref 74–99)
HCT VFR BLD AUTO: 38.1 % (ref 36–46)
HGB BLD-MCNC: 12.4 G/DL (ref 12–16)
IMM GRANULOCYTES # BLD AUTO: 0.04 X10*3/UL (ref 0–0.7)
IMM GRANULOCYTES NFR BLD AUTO: 0.6 % (ref 0–0.9)
LYMPHOCYTES # BLD AUTO: 1.22 X10*3/UL (ref 1.2–4.8)
LYMPHOCYTES NFR BLD AUTO: 17.4 %
MAGNESIUM SERPL-MCNC: 1.9 MG/DL (ref 1.6–2.4)
MCH RBC QN AUTO: 28.4 PG (ref 26–34)
MCHC RBC AUTO-ENTMCNC: 32.5 G/DL (ref 32–36)
MCV RBC AUTO: 87 FL (ref 80–100)
MONOCYTES # BLD AUTO: 0.33 X10*3/UL (ref 0.1–1)
MONOCYTES NFR BLD AUTO: 4.7 %
NEUTROPHILS # BLD AUTO: 5.23 X10*3/UL (ref 1.2–7.7)
NEUTROPHILS NFR BLD AUTO: 74.6 %
NRBC BLD-RTO: 0 /100 WBCS (ref 0–0)
PLATELET # BLD AUTO: 305 X10*3/UL (ref 150–450)
POTASSIUM SERPL-SCNC: 3.2 MMOL/L (ref 3.5–5.3)
PROT SERPL-MCNC: 7.7 G/DL (ref 6.4–8.2)
RBC # BLD AUTO: 4.36 X10*6/UL (ref 4–5.2)
S PYO DNA THROAT QL NAA+PROBE: NOT DETECTED
SARS-COV-2 RNA RESP QL NAA+PROBE: NOT DETECTED
SODIUM SERPL-SCNC: 135 MMOL/L (ref 136–145)
TSH SERPL-ACNC: 0.97 MIU/L (ref 0.44–3.98)
WBC # BLD AUTO: 7 X10*3/UL (ref 4.4–11.3)

## 2023-12-31 PROCEDURE — 71046 X-RAY EXAM CHEST 2 VIEWS: CPT

## 2023-12-31 PROCEDURE — 84702 CHORIONIC GONADOTROPIN TEST: CPT | Performed by: EMERGENCY MEDICINE

## 2023-12-31 PROCEDURE — 93005 ELECTROCARDIOGRAM TRACING: CPT

## 2023-12-31 PROCEDURE — 87636 SARSCOV2 & INF A&B AMP PRB: CPT | Performed by: EMERGENCY MEDICINE

## 2023-12-31 PROCEDURE — 96361 HYDRATE IV INFUSION ADD-ON: CPT

## 2023-12-31 PROCEDURE — 87651 STREP A DNA AMP PROBE: CPT | Performed by: EMERGENCY MEDICINE

## 2023-12-31 PROCEDURE — 71046 X-RAY EXAM CHEST 2 VIEWS: CPT | Performed by: RADIOLOGY

## 2023-12-31 PROCEDURE — 96374 THER/PROPH/DIAG INJ IV PUSH: CPT

## 2023-12-31 PROCEDURE — 83735 ASSAY OF MAGNESIUM: CPT | Performed by: EMERGENCY MEDICINE

## 2023-12-31 PROCEDURE — 99284 EMERGENCY DEPT VISIT MOD MDM: CPT | Performed by: EMERGENCY MEDICINE

## 2023-12-31 PROCEDURE — 36415 COLL VENOUS BLD VENIPUNCTURE: CPT | Performed by: EMERGENCY MEDICINE

## 2023-12-31 PROCEDURE — 85379 FIBRIN DEGRADATION QUANT: CPT | Performed by: EMERGENCY MEDICINE

## 2023-12-31 PROCEDURE — 84443 ASSAY THYROID STIM HORMONE: CPT | Performed by: EMERGENCY MEDICINE

## 2023-12-31 PROCEDURE — 2500000001 HC RX 250 WO HCPCS SELF ADMINISTERED DRUGS (ALT 637 FOR MEDICARE OP): Performed by: EMERGENCY MEDICINE

## 2023-12-31 PROCEDURE — 84484 ASSAY OF TROPONIN QUANT: CPT | Performed by: EMERGENCY MEDICINE

## 2023-12-31 PROCEDURE — 85025 COMPLETE CBC W/AUTO DIFF WBC: CPT | Performed by: EMERGENCY MEDICINE

## 2023-12-31 PROCEDURE — 2500000004 HC RX 250 GENERAL PHARMACY W/ HCPCS (ALT 636 FOR OP/ED): Performed by: EMERGENCY MEDICINE

## 2023-12-31 PROCEDURE — 80053 COMPREHEN METABOLIC PANEL: CPT | Performed by: EMERGENCY MEDICINE

## 2023-12-31 RX ORDER — NAPROXEN 500 MG/1
500 TABLET ORAL ONCE
Status: COMPLETED | OUTPATIENT
Start: 2023-12-31 | End: 2023-12-31

## 2023-12-31 RX ORDER — KETOROLAC TROMETHAMINE 30 MG/ML
15 INJECTION, SOLUTION INTRAMUSCULAR; INTRAVENOUS ONCE
Status: COMPLETED | OUTPATIENT
Start: 2023-12-31 | End: 2023-12-31

## 2023-12-31 RX ORDER — METOPROLOL SUCCINATE 50 MG/1
50 TABLET, EXTENDED RELEASE ORAL ONCE
Status: COMPLETED | OUTPATIENT
Start: 2023-12-31 | End: 2023-12-31

## 2023-12-31 RX ORDER — METOPROLOL TARTRATE 25 MG/1
TABLET, FILM COATED ORAL ONCE
Status: CANCELLED | OUTPATIENT
Start: 2023-12-31 | End: 2023-12-31

## 2023-12-31 RX ORDER — ACETAMINOPHEN 325 MG/1
975 TABLET ORAL ONCE
Status: COMPLETED | OUTPATIENT
Start: 2023-12-31 | End: 2023-12-31

## 2023-12-31 RX ORDER — POTASSIUM CHLORIDE 20 MEQ/1
40 TABLET, EXTENDED RELEASE ORAL ONCE
Status: COMPLETED | OUTPATIENT
Start: 2023-12-31 | End: 2023-12-31

## 2023-12-31 RX ADMIN — KETOROLAC TROMETHAMINE 15 MG: 30 INJECTION, SOLUTION INTRAMUSCULAR; INTRAVENOUS at 17:58

## 2023-12-31 RX ADMIN — SODIUM CHLORIDE, POTASSIUM CHLORIDE, SODIUM LACTATE AND CALCIUM CHLORIDE 1000 ML: 600; 310; 30; 20 INJECTION, SOLUTION INTRAVENOUS at 17:58

## 2023-12-31 RX ADMIN — ACETAMINOPHEN 975 MG: 325 TABLET ORAL at 21:17

## 2023-12-31 RX ADMIN — NAPROXEN 500 MG: 500 TABLET ORAL at 21:17

## 2023-12-31 RX ADMIN — METOPROLOL SUCCINATE 50 MG: 50 TABLET, EXTENDED RELEASE ORAL at 20:19

## 2023-12-31 RX ADMIN — POTASSIUM CHLORIDE 40 MEQ: 1500 TABLET, EXTENDED RELEASE ORAL at 20:19

## 2023-12-31 ASSESSMENT — COLUMBIA-SUICIDE SEVERITY RATING SCALE - C-SSRS
2. HAVE YOU ACTUALLY HAD ANY THOUGHTS OF KILLING YOURSELF?: NO
6. HAVE YOU EVER DONE ANYTHING, STARTED TO DO ANYTHING, OR PREPARED TO DO ANYTHING TO END YOUR LIFE?: NO
1. IN THE PAST MONTH, HAVE YOU WISHED YOU WERE DEAD OR WISHED YOU COULD GO TO SLEEP AND NOT WAKE UP?: NO

## 2023-12-31 ASSESSMENT — PAIN - FUNCTIONAL ASSESSMENT: PAIN_FUNCTIONAL_ASSESSMENT: 0-10

## 2023-12-31 ASSESSMENT — PAIN SCALES - GENERAL: PAINLEVEL_OUTOF10: 4

## 2023-12-31 ASSESSMENT — PAIN DESCRIPTION - LOCATION: LOCATION: CHEST

## 2023-12-31 ASSESSMENT — PAIN DESCRIPTION - DESCRIPTORS: DESCRIPTORS: PRESSURE;TIGHTNESS

## 2023-12-31 NOTE — ED TRIAGE NOTES
"C/O NON RADIATING MID STERNAL CHEST PAIN/PRESSURE/SORE THROAT, ONSET TODAY, PT DENIES TAKING ONE OF HER HTN MEDICATION BUT STATES \"I TOOK MY OTHER ONES I JUST DIDN'T GET ONE OF THEM FROM THE PHARMACY\" PT TOOK COVID TEST TODAY AT WORK AND STATES IT WAS NEGATIVE, AMBULATING POX 96% 156 HR, PT DENIES NICOTINE BUT USES THC, PT DENIES STIMULANT USE, PT STATES \"I DRANK ONE CUP OF COFFEE EARLIER THIS MORNING\"   "

## 2023-12-31 NOTE — ED PROVIDER NOTES
"CC: Chest Pain     HPI: Tatiana Prabhakar is a 33-year-old female with history including MICHELLE, hypertension, hyperlipidemia, tachycardia for which she's on metoprolol presents to the emergency department for chest pain.  States she developed midsternal, nonradiating chest pain this afternoon around the time she also developed a sore throat, cough, malaise. About an hour prior to this she notes she ate M&Ms and thinks one \"went down the wrong pipe\" but she coughed it up. Also feels like her heart is racing and states that she ran out of her metoprolol yesterday and when she went to  her prescriptions this was the only one that was not filled. Denies any recent change in medications otherwise. Denies fever, chills, vomiting, diarrhea, dyspnea, headache, abdominal pain.    Limitations to History: none  Additional History Obtained from: none    PMHx/PSHx:  Per HPI.     Social History:  - Tobacco:  reports that she has never smoked. She has never used smokeless tobacco.   - Alcohol:  reports current alcohol use.   - Drugs:  reports no history of drug use.     Medications: Reviewed in EMR.     Allergies:  Patient has no known allergies.    ???????????????????????????????????????????????????????????????  Triage Vitals:  T 37.1 °C (98.8 °F)  HR (!) 143  BP (!) 146/102  RR 18  O2 100 %      Physical Exam   Gen: awake, well-appearing, no distress  Eyes: no conjunctival injection or scleral icterus, pupils equal, extraocular movements grossly intact  ENT: nares patent, moist mucous membranes, no posterior oropharyngeal erythema/exudates/edema, uvula midline. No muffled voice or dysphonia  Neck: supple, trachea midline, no masses, no LAD  Heart: tachycardic, regular rhythm, audible heart sounds  Lungs: clear to auscultation bilaterally, no increased work of breathing  Abdomen: soft, nondistended, nontender, no guarding or rebound  Extremities: well-perfused, no edema  Neuro: alert, conversant, no facial asymmetry, speech " fluent, moving all extremities    ???????????????????????????????????????????????????????????????  ED Course/Medical Decision Makin-year-old female with history including MICHELLE, hypertension, hyperlipidemia, tachycardia for which she's on metoprolol presents to the emergency department for chest pain, sore throat, cough, malaise since this afternoon.  Also describing palpitations and notes that she ran out of her metoprolol yesterday.  On arrival she is tachycardic to 140 with otherwise stable vitals.  She is mentating appropriately, is well-perfused, and has no obvious focal findings on exam aside from heart rate.  She was placed on cardiac monitoring.  EKG per my interpretation reveals sinus tachycardia, rate 137, normal axis, normal intervals, no significant ST elevation/depression or T wave abnormalities.  She was given analgesics, IV fluids. Heart rate improved to 110. She was given her home dose of metoprolol with further improvement. I independently reviewed results and these are notable for mild hypokalemia. Troponin and ddimer were normal. Also independently reviewed chest x-ray which is unremarkable. Viral swabs negative but symptoms still suggestive of possible viral etiology. She was given potassium repletion. She remains stable. Results, differential, disposition discussed and she was comfortable with discharge. States her metoprolol should be ready at the pharmacy and does not need another prescription. She was given return precautions.     External records reviewed: outpatient notes   Diagnostic imaging independently reviewed/interpreted by me (as reflected in MDM) includes: EKG, labs, imaging  Discussion of management with other providers: n/a  Escalation of Care: outpatient management appropriate    Disposition: Discharge       Procedures ? SmartLinks last updated 2023 6:01 PM        Lisa Alvarez MD  [phone]       Lisa Alvarez MD  24 [phone]

## 2024-01-01 NOTE — DISCHARGE INSTRUCTIONS
You were seen in the emergency department for chest pain, rapid heart rate, sore throat. Your workup was unremarkable aside from slightly low potassium which was replaced.  Your labs did not show evidence of a blood clot, strain on your heart, or any other abnormalities.  Chest x-ray not show signs of infection.  Your heart rate was fast but this did improve with IV fluids and metoprolol.  You may have been slightly dehydrated and having missed doses of your metoprolol likely contributing to this as well.  Monitor symptoms closely.  Take Tylenol and ibuprofen or naproxen for pain control.  Take your metoprolol every day as prescribed.  If you still have issues getting her metoprolol tomorrow, call your primary care doctor.

## 2024-01-10 LAB
ATRIAL RATE: 137 BPM
P AXIS: 50 DEGREES
P OFFSET: 202 MS
P ONSET: 149 MS
PR INTERVAL: 146 MS
Q ONSET: 222 MS
QRS COUNT: 22 BEATS
QRS DURATION: 74 MS
QT INTERVAL: 288 MS
QTC CALCULATION(BAZETT): 434 MS
QTC FREDERICIA: 379 MS
R AXIS: 14 DEGREES
T AXIS: 58 DEGREES
T OFFSET: 366 MS
VENTRICULAR RATE: 137 BPM

## 2024-01-11 ENCOUNTER — APPOINTMENT (OUTPATIENT)
Dept: OBSTETRICS AND GYNECOLOGY | Facility: CLINIC | Age: 34
End: 2024-01-11
Payer: COMMERCIAL

## 2024-01-25 ENCOUNTER — TELEPHONE (OUTPATIENT)
Dept: PRIMARY CARE | Facility: CLINIC | Age: 34
End: 2024-01-25

## 2024-01-25 NOTE — TELEPHONE ENCOUNTER
Patient had a CPAP Machine has been recalled and and was needs a new order for a cpap. Her company [employer] is only asking for demo and a new order.     Fax [phone]

## 2024-01-31 ENCOUNTER — OFFICE VISIT (OUTPATIENT)
Dept: OBSTETRICS AND GYNECOLOGY | Facility: CLINIC | Age: 34
End: 2024-01-31
Payer: COMMERCIAL

## 2024-01-31 ENCOUNTER — PREP FOR PROCEDURE (OUTPATIENT)
Dept: OBSTETRICS AND GYNECOLOGY | Facility: CLINIC | Age: 34
End: 2024-01-31

## 2024-01-31 VITALS
BODY MASS INDEX: 36.29 KG/M2 | WEIGHT: 245 LBS | HEART RATE: 73 BPM | SYSTOLIC BLOOD PRESSURE: 124 MMHG | HEIGHT: 69 IN | DIASTOLIC BLOOD PRESSURE: 80 MMHG

## 2024-01-31 DIAGNOSIS — N39.3 SUI (STRESS URINARY INCONTINENCE, FEMALE): ICD-10-CM

## 2024-01-31 DIAGNOSIS — N93.9 ABNORMAL UTERINE BLEEDING (AUB): Primary | ICD-10-CM

## 2024-01-31 DIAGNOSIS — N93.9 ABNORMAL UTERINE BLEEDING (AUB): ICD-10-CM

## 2024-01-31 DIAGNOSIS — Z98.51 H/O TUBAL LIGATION: ICD-10-CM

## 2024-01-31 DIAGNOSIS — N81.4 UTEROVAGINAL PROLAPSE: ICD-10-CM

## 2024-01-31 DIAGNOSIS — N81.6 POP-Q STAGE 2 RECTOCELE: ICD-10-CM

## 2024-01-31 DIAGNOSIS — N81.10 POP-Q STAGE 2 CYSTOCELE: Primary | ICD-10-CM

## 2024-01-31 PROCEDURE — 99205 OFFICE O/P NEW HI 60 MIN: CPT | Performed by: OBSTETRICS & GYNECOLOGY

## 2024-01-31 PROCEDURE — 51798 US URINE CAPACITY MEASURE: CPT | Performed by: OBSTETRICS & GYNECOLOGY

## 2024-01-31 PROCEDURE — 3074F SYST BP LT 130 MM HG: CPT | Performed by: OBSTETRICS & GYNECOLOGY

## 2024-01-31 PROCEDURE — 3008F BODY MASS INDEX DOCD: CPT | Performed by: OBSTETRICS & GYNECOLOGY

## 2024-01-31 PROCEDURE — 3079F DIAST BP 80-89 MM HG: CPT | Performed by: OBSTETRICS & GYNECOLOGY

## 2024-01-31 PROCEDURE — 99215 OFFICE O/P EST HI 40 MIN: CPT | Performed by: OBSTETRICS & GYNECOLOGY

## 2024-01-31 PROCEDURE — 1036F TOBACCO NON-USER: CPT | Performed by: OBSTETRICS & GYNECOLOGY

## 2024-01-31 RX ORDER — METRONIDAZOLE 500 MG/100ML
500 INJECTION, SOLUTION INTRAVENOUS EVERY 8 HOURS
Status: CANCELLED | OUTPATIENT
Start: 2024-01-31

## 2024-01-31 RX ORDER — ACETAMINOPHEN 325 MG/1
975 TABLET ORAL ONCE
Status: CANCELLED | OUTPATIENT
Start: 2024-01-31 | End: 2024-01-31

## 2024-01-31 RX ORDER — CELECOXIB 400 MG/1
400 CAPSULE ORAL ONCE
Status: CANCELLED | OUTPATIENT
Start: 2024-01-31 | End: 2024-01-31

## 2024-01-31 RX ORDER — PHENAZOPYRIDINE HYDROCHLORIDE 200 MG/1
200 TABLET, FILM COATED ORAL ONCE
Status: CANCELLED | OUTPATIENT
Start: 2024-01-31 | End: 2024-01-31

## 2024-01-31 RX ORDER — HEPARIN SODIUM 5000 [USP'U]/ML
5000 INJECTION, SOLUTION INTRAVENOUS; SUBCUTANEOUS ONCE
Status: CANCELLED | OUTPATIENT
Start: 2024-01-31 | End: 2024-01-31

## 2024-01-31 ASSESSMENT — ENCOUNTER SYMPTOMS
CONSTITUTIONAL NEGATIVE: 1
GASTROINTESTINAL NEGATIVE: 1
RESPIRATORY NEGATIVE: 1
PSYCHIATRIC NEGATIVE: 1
NEUROLOGICAL NEGATIVE: 1
CARDIOVASCULAR NEGATIVE: 1
EYES NEGATIVE: 1
ENDOCRINE NEGATIVE: 1
MUSCULOSKELETAL NEGATIVE: 1

## 2024-01-31 ASSESSMENT — PAIN SCALES - GENERAL: PAINLEVEL: 6

## 2024-02-01 ENCOUNTER — HOSPITAL ENCOUNTER (OUTPATIENT)
Facility: HOSPITAL | Age: 34
Setting detail: OUTPATIENT SURGERY
End: 2024-02-01
Attending: OBSTETRICS & GYNECOLOGY | Admitting: OBSTETRICS & GYNECOLOGY
Payer: COMMERCIAL

## 2024-02-01 PROBLEM — N81.4 UTEROVAGINAL PROLAPSE: Status: ACTIVE | Noted: 2024-01-31

## 2024-02-06 ENCOUNTER — TELEPHONE (OUTPATIENT)
Dept: OBSTETRICS AND GYNECOLOGY | Facility: CLINIC | Age: 34
End: 2024-02-06
Payer: COMMERCIAL

## 2024-02-06 NOTE — TELEPHONE ENCOUNTER
I spoke with patient on 2/1/2024 to discuss scheduling surgery with Dr. Metz at [facility]. Patient agrees with date of 4/2/2024 for a total vaginal hysterectomy, bilateral salpingectomy, uterosacral ligament suspension, anterior and posterior repairs, possible mid-urethral sling and cystoscopy. PAT has been ordered.

## 2024-02-07 ENCOUNTER — PROCEDURE VISIT (OUTPATIENT)
Dept: OBSTETRICS AND GYNECOLOGY | Facility: CLINIC | Age: 34
End: 2024-02-07
Payer: COMMERCIAL

## 2024-02-07 DIAGNOSIS — N39.3 SUI (STRESS URINARY INCONTINENCE, FEMALE): ICD-10-CM

## 2024-02-07 PROCEDURE — 51741 ELECTRO-UROFLOWMETRY FIRST: CPT | Performed by: OBSTETRICS & GYNECOLOGY

## 2024-02-07 PROCEDURE — 51729 CYSTOMETROGRAM W/VP&UP: CPT | Performed by: OBSTETRICS & GYNECOLOGY

## 2024-02-07 PROCEDURE — 51797 INTRAABDOMINAL PRESSURE TEST: CPT | Performed by: OBSTETRICS & GYNECOLOGY

## 2024-02-07 PROCEDURE — 51784 ANAL/URINARY MUSCLE STUDY: CPT | Performed by: OBSTETRICS & GYNECOLOGY

## 2024-02-07 NOTE — PROGRESS NOTES
Patient ID: Tatiana Prabhakar is a 33 y.o. female.    Uroflowmetry    Date/Time: 2/7/2024 10:37 AM    Performed by: Lisa Mohr MA  Authorized by: Remedios Metz MD MPH    Procedure discussed: discussed risks, benefits and alternatives    Chaperone present: no    Timeout: timeout called immediately prior to procedure    Prep: patient was prepped and draped in usual sterile fashion    Position: sitting    Procedure Details     Procedure: CMG with UPP/voiding pressures, EMG, intra-abdominal voiding study and uroflow      CMG with UPP/Voiding Pressures Details:     First urge to void (mL): 98    Urgency to void (mL): 193    Bladder capacity (mL): 254    Uroflow Details:     Pre-void volume (mL): 60.8    Voiding duration (sec): 8.6    Average flow rate (mL/sec): 7.7    Max flow rate (mL/sec): 14.1    Voided urine (mL): 277    Residual urine (mL): 0    Post-Procedure Details     Outcome: patient tolerated procedure well with no complications      Additional Details      No stress noted on UDS          Urodynamics Evaluation Documentation      HPI/Indication for UDS: 33 year old female with asymptomatic stage 2 cystocele with some uterine descent, AUB, ARIS, and pelvic pain. Comorbidities include: MICHELLE, HTN, HLD, and tachycardia.      Diagnoses:  #1 Abnormal uterine bleeding  #2 Pelvic pain  #3 Cystocele with some uterine descent, stage 2  #4 Stress urinary incontinence      Plan:  1. AUB  - We reviewed her pelvic U/S results from July 2023 which showed a normal sized uterus at 9cm and a normal endometrial thickness of 8mm.   - Given that her AUB is not well-managed on po Norethindrone we recommended either continuing po Aygestin, consideration of IUD placement, other OCPs, or endometrial sampling (I.e. in office EMB). If her EMB results are normal/benign then we recommend placing an IUD to help manage AUB. If she is not interested in IUD placement the only other option to definitively manage AUB would be to consider a  vaginal vs. lx hysterectomy; of note, she has completed child bearing as she had a BL tubal ligation x7 years ago.   - She is very interested in pursuing a hysterectomy.   - Plan to schedule an EMB prior to scheduling TVH + USLS and +/- APR. We advised her to take 600-800mg of Ibuprofen/Advil the morning prior to her EMB.      2. Cystocele with some uterine descent, stage 2  - Asymptomatic   - If she is considering pursuing a hysterectomy we would consider pursuing of POP repair (I.e. USLS + TVH).   - Gave her PI handout on the USLS to review and consider.   - Case request sent for vaginal USLS, TVH, +/- APR, possible midurethral sling, and cystoscopy with Dr. Remedios Metz.      3. ARIS  - We discussed that in order to correctly treat her urinary issues she will need UDS to fully assess/diagnose the type of urinary problems she is having (i.e., ARIS vs. ISD vs. OAB). Urodynamics assesses bladder function and capacity, voiding/filling patterns, urethral strength, assess if there is NARAYANAN, MUCP, etc.   - Counseled on the importance of UDS to evaluate if there will be occult ARIS after POP repair and if there is we would offer her an anti-incontinence procedure at the time of her POP repair (i.e. midurethral sling).  - Gave her PI handout on the TVT to review and consider.      Preop planning  - will need PAT  - will need UDS  - EMB scheduled    UDS  Calibrated electronic equipment was used throughout testing.    12843 Complex Urinary Flow Study:   Maximum flow: 14 ml/s  Voiding time: of 8 sec   Voided volume of 60 cc.    61997 PVR by ultrasound: 10 ml    34987 Complex cystometrogram with  and UPP-Simultaneous measurement of intraabdominal pressure using a vaginal/rectal catheter and bladder pressure using a urethral catheter. Cystometry was done.    Fill rate: 100 ml/min  First sensation occurred at 98 ml at a detrusor pressure of 01 cm H2O   Strong desire to void occurred at 193 ml with a detrusor pressure of -2 cm  H2O.   Maximum capacity of 254 ml with a detrusor pressure of -3 cm H2O.  DO: none seen  ARIS: none seen    Urethral Pressure Profile:   MUCP was 94 cm of water  Functional urethral length of 1.8 cm.     Pressure Flow Study 35900 Voiding pressure studies ()? intraabdominal voiding pressure (AP)  A voiding pressure study was completed and performed utilizing both bladder and rectal catheters for detrusor, vesical, and abdominal pressure measurement.    Maximum flow rate: 21 ml/s  Average flow rate: 9 ml/s  Pdet at max flow: -3 cm H2)  Maximum detrusor pressure of 1 cm H2O     74427 EMG of anal or urethral sphincter, other than needle, any technique  Electromyography: Provocative maneuvers produced appropriate changes in waveforms.     Summary  Normal capacity and compliance, no aris or do. Voids via valsalva mechanism. Concern for acontractile bladder. No anti-incontinence procedure indicated based on this study but will re-discuss as she reports ARIS at home.     The printed report of the above testing will be scanned into the chart and can be found in the Media tab.

## 2024-02-22 ENCOUNTER — OFFICE VISIT (OUTPATIENT)
Dept: OBSTETRICS AND GYNECOLOGY | Facility: CLINIC | Age: 34
End: 2024-02-22
Payer: COMMERCIAL

## 2024-02-22 VITALS
DIASTOLIC BLOOD PRESSURE: 84 MMHG | WEIGHT: 247 LBS | SYSTOLIC BLOOD PRESSURE: 123 MMHG | BODY MASS INDEX: 36.58 KG/M2 | HEIGHT: 69 IN | HEART RATE: 105 BPM

## 2024-02-22 DIAGNOSIS — N93.9 ABNORMAL UTERINE BLEEDING (AUB): Primary | ICD-10-CM

## 2024-02-22 DIAGNOSIS — Z92.89 HISTORY OF ENDOMETRIAL BIOPSY: ICD-10-CM

## 2024-02-22 PROCEDURE — 1036F TOBACCO NON-USER: CPT | Performed by: OBSTETRICS & GYNECOLOGY

## 2024-02-22 PROCEDURE — 58100 BIOPSY OF UTERUS LINING: CPT | Performed by: OBSTETRICS & GYNECOLOGY

## 2024-02-22 PROCEDURE — 88305 TISSUE EXAM BY PATHOLOGIST: CPT | Performed by: PATHOLOGY

## 2024-02-22 PROCEDURE — 3074F SYST BP LT 130 MM HG: CPT | Performed by: OBSTETRICS & GYNECOLOGY

## 2024-02-22 PROCEDURE — 99213 OFFICE O/P EST LOW 20 MIN: CPT | Performed by: OBSTETRICS & GYNECOLOGY

## 2024-02-22 PROCEDURE — 3079F DIAST BP 80-89 MM HG: CPT | Performed by: OBSTETRICS & GYNECOLOGY

## 2024-02-22 PROCEDURE — 88305 TISSUE EXAM BY PATHOLOGIST: CPT | Mod: TC,SUR | Performed by: OBSTETRICS & GYNECOLOGY

## 2024-02-22 PROCEDURE — 3008F BODY MASS INDEX DOCD: CPT | Performed by: OBSTETRICS & GYNECOLOGY

## 2024-02-22 ASSESSMENT — ENCOUNTER SYMPTOMS
NEUROLOGICAL NEGATIVE: 1
MUSCULOSKELETAL NEGATIVE: 1
CARDIOVASCULAR NEGATIVE: 1
CONSTITUTIONAL NEGATIVE: 1
PSYCHIATRIC NEGATIVE: 1
EYES NEGATIVE: 1
ENDOCRINE NEGATIVE: 1
RESPIRATORY NEGATIVE: 1
GASTROINTESTINAL NEGATIVE: 1

## 2024-02-22 ASSESSMENT — PAIN SCALES - GENERAL: PAINLEVEL: 0-NO PAIN

## 2024-02-23 NOTE — PROGRESS NOTES
[facility] Department of Urogynecology   Remedios Metz MD, MPH   [phone]    ASSESSMENT AND PLAN:   33 year old female with asymptomatic stage 2 cystocele with some uterine descent, AUB, ARIS, and pelvic pain. Comorbidities include: MICHELLE, HTN, HLD, and tachycardia.      Diagnoses:  #1 Abnormal uterine bleeding  #2 Cystocele with some uterine descent, stage 2  #3 Stress urinary incontinence     Plan:  1. AUB  - The patient notes resolution of her vaginal bleeding 2 weeks ago since discontinuing Norethindrone. She would like to reconsider the hysterectomy. She is not ready to cancel yet but is considering cancellation  - She would like to proceed with the EMB today to rule out any neoplastic process/etiology of her previous AUB.   - We reviewed the risks and benefits of the EMB procedure. She was advised her to take Ibuprofen prior to and after the procedure for comfort measures. Patient verbalized her understanding and signed the informed consent today prior to proceeding with the EMB.   - EMB performed today to evaluate endometrium and collected adequate sample size with good hemostasis obtained.     2. Cystocele with some uterine descent (stage 2), ARIS  - We reviewed her UDS results which did not demonstrate DO or ARIS.   - After discussion she does not experience large volume ARIS at baseline but rather ARIS with a full bladder + hard cough 1-2x per year; will defer anti-incontinence procedure.   - Patient might cancel OR date of 4/2/2024 if EMB pathology is benign for the previously scheduled vaginal USLS, +/- APR, and cystoscopy with Dr. Remedios Metz. We encouraged her to let us know at least 2 weeks in advance if she wishes to cancel her surgery date.     Follow up as needed with Dr. Remedios Metz.     [employer] Attestation  By signing my name below, Nitin MCFARLANE Scribe, attest that this documentation has been prepared under the direction and in the presence of Remedios Metz MD MPH on 02/22/2024  at 9:23 PM.     Problem List Items Addressed This Visit          Genitourinary and Reproductive    Abnormal uterine bleeding (AUB) - Primary    Relevant Orders    Surgical Pathology Consultation     Other Visit Diagnoses       History of endometrial biopsy        Relevant Orders    Surgical Pathology Consultation           I spent a total of 20 minutes in face to face and non face to face time.      Remedios Metz MD, MPH, FACOG     Established    HISTORY OF PRESENT ILLNESS:   33 year old female presenting for EMB to evaluate AUB prior to consideration of POP repair surgery.     Record Review:   - 2/7/2024 UDS results: No DO or ARIS. No anti-incontinence procedure is indicated but will rediscuss since she reports ARIS at home.   - 1/31/2024 Dr. Metz note: AUB - Normal sized uterus of 9cm and normal endometrial thickness of 8mm on pelvic U/S from July 2023. Given that her AUB is not well-managed on po Norethindrone we recommended endometrial sampling prior to consideration of hysterectomy at the time of her POP repair. Stage 2 cystocele with uterine descent -Placed a case request for vaginal USLS, TVH, +/- APR, possible midurethral sling, and cystoscopy. ARIS - Ordered UDS.     Prolapse Symptoms;  - Patient is wanting to defer POP repair surgery at this time.     Urinary Symptoms:   - She endorses ARIS with a very full bladder and a hard sneeze; ARIS occurs 1-2x per year.     Vaginal Symptoms:  - Her vaginal bleeding discontinued 2 weeks ago after discontinuing po Norethindrone. She would not like to proceed with the hysterectomy at the time of her POP repair.       Past Medical History:     Past Medical History:   Diagnosis Date    Blepharochalasis left eye, unspecified eyelid 11/19/2018    Blepharochalasis of left eye    Blepharochalasis left upper eyelid 04/07/2017    Blepharochalasis left upper eyelid    Body mass index (BMI) 38.0-38.9, adult 12/29/2016    BMI 38.0-38.9,adult    Chronic sinusitis, unspecified  12/18/2017    Other sinusitis    Edema of left upper eyelid 04/07/2017    Edema of left upper eyelid    Encounter for gynecological examination (general) (routine) without abnormal findings 05/18/2017    Encounter for annual routine gynecological examination    Encounter for routine postpartum follow-up 02/07/2017    Postpartum care following vaginal delivery    Essential (primary) hypertension 02/07/2017    Benign essential hypertension    Impacted cerumen, left ear 10/13/2017    Impacted cerumen of left ear    Localized edema 10/30/2014    Periorbital edema    Low back pain, unspecified 04/18/2018    Dorsalgia of lumbar region    Obstructive sleep apnea (adult) (pediatric) 10/01/2019    Obstructive sleep apnea of adult    Obstructive sleep apnea (adult) (pediatric) 12/04/2014    Obstructive sleep apnea    Other conditions influencing health status 04/18/2018    Excessive mucus secretion    Other conditions influencing health status     History of recent childbirth    Personal history of other diseases of the circulatory system 10/28/2015    History of cardiac murmur    Personal history of other diseases of the nervous system and sense organs 04/07/2017    History of papilledema    Personal history of other diseases of the nervous system and sense organs 09/23/2015    History of papilledema    Personal history of other diseases of the respiratory system 06/02/2020    History of allergic rhinitis    Personal history of other endocrine, nutritional and metabolic disease 10/30/2015    History of hypokalemia    Personal history of other specified conditions 08/01/2016    History of headache    Personal history of other specified conditions 04/07/2017    History of headache    Personal history of other specified conditions 05/18/2017    History of abdominal pain    Polyp of cervix uteri 03/17/2016    Cervical polyp    Pure hypercholesterolemia, unspecified 02/20/2015    Low-density-lipoid-type (LDL) hyperlipoproteinemia     Scotoma of blind spot area, left eye 04/07/2017    Enlarged blind spot of left eye    Supervision of pregnancy with other poor reproductive or obstetric history, first trimester 12/29/2016    History of pre-eclampsia in prior pregnancy, currently pregnant, first trimester    Unspecified acute lower respiratory infection 12/18/2017    Lower respiratory infection (e.g., bronchitis, pneumonia, pneumonitis, pulmonitis)    Unspecified asthma, uncomplicated 06/02/2020    Asthmatic bronchitis    Unspecified visual disturbance 04/07/2017    Transient vision disturbance        Past Surgical History:     Past Surgical History:   Procedure Laterality Date    OTHER SURGICAL HISTORY  02/07/2017    Tubal Ligation After Vaginal Delivery (Same Hospitalization)       Medications:     Prior to Admission medications    Medication Sig Start Date End Date Taking? Authorizing Provider   amLODIPine (Norvasc) 10 mg tablet TAKE 1 TABLET BY MOUTH EVERY DAY 12/20/23  Yes Lavell Garcia MD   blood pressure test kit-large kit 1 device used daily for blood pressure monitoring 11/16/23  Yes Lavell Garcia MD   ibuprofen 600 mg tablet Take 1 tablet (600 mg) by mouth every 6 hours if needed for mild pain (1 - 3).   Yes Historical Provider, MD   metoprolol succinate XL (Toprol-XL) 50 mg 24 hr tablet Take 1 tablet (50 mg) by mouth once daily. Do not crush or chew. 8/8/23  Yes Lavell Garcia MD   norethindrone (Aygestin) 5 mg tablet Take 1 tablet (5 mg) by mouth once daily. 7/20/23  Yes Historical Provider, MD   triamterene-hydrochlorothiazid (Maxzide-25) 37.5-25 mg tablet TAKE 1 TABLET BY MOUTH EVERY DAY 12/20/23  Yes Lavell Garcia MD        ROS  Review of Systems   Constitutional: Negative.    HENT: Negative.     Eyes: Negative.    Respiratory: Negative.     Cardiovascular: Negative.    Gastrointestinal: Negative.    Endocrine: Negative.    Genitourinary: Negative.    Musculoskeletal: Negative.    Neurological: Negative.   "  Psychiatric/Behavioral: Negative.            PHYSICAL EXAM:    /84   Pulse 105   Ht 1.753 m (5' 9\")   Wt 112 kg (247 lb)   BMI 36.48 kg/m²   No LMP recorded.    Declines chaperone for physical exam.      Well developed, well nourished, in no apparent distress.   Neurologic/Psychiatric:  Awake, Alert and Oriented times 3.  Affect normal.     GENITAL/URINARY:     External Genitalia:  The patient has normal appearing external genitalia, normal skenes and bartholins glands, and a normal hair distribution.  Her vulva is without lesions, erythema or discharge.  It is non-tender with appropriate sensation.     Urethral Meatus:  Size normal, Location normal, Lesions absent, Prolapse absent,      Urethra:  Fullness absent, Masses absent,      Bladder:  Fullness absent, Masses absent, Tenderness absent,      Vagina:  General appearance normal, Estrogen effect normal, Discharge absent, Lesions absent.     Cervix: Normal, no discharge.     PROCEDURE:   Patient ID: Tatiana Prabhakar is a 33 y.o. female.    Endometrial biopsy    Date/Time: 2/22/2024 9:32 PM    Performed by: Remedios Metz MD MPH  Authorized by: Remedios Metz MD MPH    Consent:     Consent obtained: written and verbal    Consent given by: patient    Risks discussed: bleeding and pain    Alternatives discussed: alternative treatment    Patient agrees, verbalizes understanding, and wants to proceed: yes      Procedure explained and questions answered to patient or proxy's satisfaction: yes    Indications:     Indications: abnormal uterine bleeding    Pre-procedure:     Premeds: NSAID    Procedure:     A bimanual exam was performed: yes      Prepped with: Betadine    Tenaculum used: yes    Findings:     Cervix: normal      Specimen collected: specimen collected and sent to pathology      Patient tolerance: tolerated well, no immediate complications  Comments:     Procedure comments: No bleeding, hemostasis obtained.       DATA AND DIAGNOSTIC STUDIES " REVIEWED   Lab Results   Component Value Date    GLUCOSE 90 12/31/2023    CALCIUM 9.2 12/31/2023     (L) 12/31/2023    K 3.2 (L) 12/31/2023    CO2 28 12/31/2023     12/31/2023    BUN 10 12/31/2023    CREATININE 1.14 (H) 12/31/2023     Lab Results   Component Value Date    WBC 7.0 12/31/2023    HGB 12.4 12/31/2023    HCT 38.1 12/31/2023    MCV 87 12/31/2023     12/31/2023

## 2024-02-28 LAB
LABORATORY COMMENT REPORT: NORMAL
PATH REPORT.FINAL DX SPEC: NORMAL
PATH REPORT.GROSS SPEC: NORMAL
PATH REPORT.RELEVANT HX SPEC: NORMAL
PATH REPORT.TOTAL CANCER: NORMAL

## 2024-03-15 ENCOUNTER — OFFICE VISIT (OUTPATIENT)
Dept: OBSTETRICS AND GYNECOLOGY | Facility: CLINIC | Age: 34
End: 2024-03-15
Payer: COMMERCIAL

## 2024-03-15 VITALS — SYSTOLIC BLOOD PRESSURE: 115 MMHG | DIASTOLIC BLOOD PRESSURE: 78 MMHG | HEART RATE: 88 BPM

## 2024-03-15 DIAGNOSIS — N93.9 ABNORMAL UTERINE BLEEDING (AUB): ICD-10-CM

## 2024-03-15 DIAGNOSIS — B37.31 YEAST VAGINITIS: ICD-10-CM

## 2024-03-15 DIAGNOSIS — B96.89 BV (BACTERIAL VAGINOSIS): Primary | ICD-10-CM

## 2024-03-15 DIAGNOSIS — N76.0 BV (BACTERIAL VAGINOSIS): Primary | ICD-10-CM

## 2024-03-15 PROCEDURE — 3008F BODY MASS INDEX DOCD: CPT | Performed by: OBSTETRICS & GYNECOLOGY

## 2024-03-15 PROCEDURE — 1036F TOBACCO NON-USER: CPT | Performed by: OBSTETRICS & GYNECOLOGY

## 2024-03-15 PROCEDURE — 99213 OFFICE O/P EST LOW 20 MIN: CPT | Performed by: OBSTETRICS & GYNECOLOGY

## 2024-03-15 PROCEDURE — 87205 SMEAR GRAM STAIN: CPT | Performed by: OBSTETRICS & GYNECOLOGY

## 2024-03-15 PROCEDURE — 3074F SYST BP LT 130 MM HG: CPT | Performed by: OBSTETRICS & GYNECOLOGY

## 2024-03-15 PROCEDURE — 3078F DIAST BP <80 MM HG: CPT | Performed by: OBSTETRICS & GYNECOLOGY

## 2024-03-15 ASSESSMENT — ENCOUNTER SYMPTOMS
ENDOCRINE NEGATIVE: 1
EYES NEGATIVE: 1
CARDIOVASCULAR NEGATIVE: 1
MUSCULOSKELETAL NEGATIVE: 1
PSYCHIATRIC NEGATIVE: 1
CONSTITUTIONAL NEGATIVE: 1
RESPIRATORY NEGATIVE: 1
GASTROINTESTINAL NEGATIVE: 1
NEUROLOGICAL NEGATIVE: 1

## 2024-03-16 NOTE — PROGRESS NOTES
[facility] Department of Urogynecology   Remedios Metz MD, MPH   [phone]    ASSESSMENT AND PLAN:   33 year old female with asymptomatic stage 2 cystocele with some uterine descent, AUB, ARIS, and pelvic pain. Comorbidities include: MICHELLE, HTN, HLD, and tachycardia.      Diagnoses:  #1 Abnormal uterine bleeding  #2 Cystocele with some uterine descent, stage 2  #3 Stress urinary incontinence   #4 Yeast vaginitis/BV     Plan:  1. AUB  - We reviewed her EMB pathology from 2/22/2024 which was benign.   - Gave her paperwork through [employer] to excuse her from work due to her past appointments with our office.   - She is interested in deferring TVH at this time since her AUB has largely resolved and since EMB results were benign.     2. Cystocele with some uterine descent (stage 2 asymptomatic), ARIS   - We previously reviewed her UDS results which did not demonstrate DO or ARIS.   - The patient would like to cancel her POP surgical repair on 4/2/2024 which included vaginal USLS, +/- APR, TVH, and cystoscopy since the EMB pathology was benign.   - Discussed that she may return to discuss a POP repair again in the future when she becomes bothered by her symptoms, if she has difficulty emptying her bladder due to her prolapse, or her POP is negatively impacting her quality of life/preventing her from doing daily activities.     3. Yeast vaginitis, BV/vaginal discharge  - Upon speculum exam today there is a thin, watery white vaginal discharge appreciated.   - Collected e-swab to assess for BV and yeast. We will call her if the results are abnormal or if she needs additional treatment/medication called in to her pharmacy.   - Declines STI testing.     Follow up as needed with Dr. Remedios Metz.     Scribe Attestation  By signing my name below, INitin Scribe, attest that this documentation has been prepared under the direction and in the presence of Remedios Metz MD MPH on 03/15/2024 at 9:41 PM.      Problem List Items Addressed This Visit          Genitourinary and Reproductive    Yeast vaginitis    Relevant Orders    Vaginitis Gram Stain For Bacterial Vaginosis + Yeast      I spent a total of 20 minutes in face to face and non face to face time.      Remedios Metz MD, MPH, FACOG     I Dr. Metz, personally performed the services described in the documentation as scribed in my presence and confirm it is both complete and accurate.  Remedios Metz MD, MPH, FACOG      Established    HISTORY OF PRESENT ILLNESS:   33 year old following up on AUB s/p EMB to discuss pathology results and complains of vaginal discharge.     Record Review:   - 2/22/2024 Dr. Remedios Metz note Re: AUB - Collected EMB. Cystocele with uterine descent and ARIS - She is scheduled for a USLS, TVH, and APR but is considering cancelling if EMB results are negative.   - 2/22/2024 EMB pathology: Fragments of secretory pattern endometrium (benign).   - 2/7/2024 UDS results: No DO or ARIS. No anti-incontinence procedure is indicated but will rediscuss since she reports ARIS at home.   - 1/31/2024 Dr. Metz note: AUB - Normal sized uterus of 9cm and normal endometrial thickness of 8mm on pelvic U/S from July 2023. Given that her AUB is not well-managed on po Norethindrone we recommended endometrial sampling prior to consideration of hysterectomy at the time of her POP repair. Stage 2 cystocele with uterine descent - Placed a case request for vaginal USLS, TVH, +/- APR, possible midurethral sling, and cystoscopy. ARIS - Ordered UDS.     Vaginal Symptoms:  - She reports having a regular menstrual period since her last visit and has cramping with period.   - Denies experiencing AUB/DUB.   - Endorses an occasional white/clear colored vaginal discharge on her pad she has noticed more recently.   - Does report hx of BV and denies discharge smelling fishy.   - She is not interested in STI testing today.   - Last pap: 11/7/2022 (NILM and HPV-)        Past Medical History:     Past Medical History:   Diagnosis Date    Blepharochalasis left eye, unspecified eyelid 11/19/2018    Blepharochalasis of left eye    Blepharochalasis left upper eyelid 04/07/2017    Blepharochalasis left upper eyelid    Body mass index (BMI) 38.0-38.9, adult 12/29/2016    BMI 38.0-38.9,adult    Chronic sinusitis, unspecified 12/18/2017    Other sinusitis    Edema of left upper eyelid 04/07/2017    Edema of left upper eyelid    Encounter for gynecological examination (general) (routine) without abnormal findings 05/18/2017    Encounter for annual routine gynecological examination    Encounter for routine postpartum follow-up 02/07/2017    Postpartum care following vaginal delivery    Essential (primary) hypertension 02/07/2017    Benign essential hypertension    Impacted cerumen, left ear 10/13/2017    Impacted cerumen of left ear    Localized edema 10/30/2014    Periorbital edema    Low back pain, unspecified 04/18/2018    Dorsalgia of lumbar region    Obstructive sleep apnea (adult) (pediatric) 10/01/2019    Obstructive sleep apnea of adult    Obstructive sleep apnea (adult) (pediatric) 12/04/2014    Obstructive sleep apnea    Other conditions influencing health status 04/18/2018    Excessive mucus secretion    Other conditions influencing health status     History of recent childbirth    Personal history of other diseases of the circulatory system 10/28/2015    History of cardiac murmur    Personal history of other diseases of the nervous system and sense organs 04/07/2017    History of papilledema    Personal history of other diseases of the nervous system and sense organs 09/23/2015    History of papilledema    Personal history of other diseases of the respiratory system 06/02/2020    History of allergic rhinitis    Personal history of other endocrine, nutritional and metabolic disease 10/30/2015    History of hypokalemia    Personal history of other specified conditions 08/01/2016     History of headache    Personal history of other specified conditions 04/07/2017    History of headache    Personal history of other specified conditions 05/18/2017    History of abdominal pain    Polyp of cervix uteri 03/17/2016    Cervical polyp    Pure hypercholesterolemia, unspecified 02/20/2015    Low-density-lipoid-type (LDL) hyperlipoproteinemia    Scotoma of blind spot area, left eye 04/07/2017    Enlarged blind spot of left eye    Supervision of pregnancy with other poor reproductive or obstetric history, first trimester 12/29/2016    History of pre-eclampsia in prior pregnancy, currently pregnant, first trimester    Unspecified acute lower respiratory infection 12/18/2017    Lower respiratory infection (e.g., bronchitis, pneumonia, pneumonitis, pulmonitis)    Unspecified asthma, uncomplicated 06/02/2020    Asthmatic bronchitis    Unspecified visual disturbance 04/07/2017    Transient vision disturbance        Past Surgical History:     Past Surgical History:   Procedure Laterality Date    OTHER SURGICAL HISTORY  02/07/2017    Tubal Ligation After Vaginal Delivery (Same Hospitalization)       Medications:     Prior to Admission medications    Medication Sig Start Date End Date Taking? Authorizing Provider   amLODIPine (Norvasc) 10 mg tablet TAKE 1 TABLET BY MOUTH EVERY DAY 12/20/23  Yes Lavell Garcia MD   ibuprofen 600 mg tablet Take 1 tablet (600 mg) by mouth every 6 hours if needed for mild pain (1 - 3).   Yes Historical Provider, MD   metoprolol succinate XL (Toprol-XL) 50 mg 24 hr tablet Take 1 tablet (50 mg) by mouth once daily. Do not crush or chew. 8/8/23  Yes Lavell Garcia MD   norethindrone (Aygestin) 5 mg tablet Take 1 tablet (5 mg) by mouth once daily. 7/20/23  Yes Historical Provider, MD   triamterene-hydrochlorothiazid (Maxzide-25) 37.5-25 mg tablet TAKE 1 TABLET BY MOUTH EVERY DAY 12/20/23  Yes Lavell Garcia MD   blood pressure test kit-large kit 1 device used daily for blood  "pressure monitoring 11/16/23   Lavell Garcia MD        ROS  Review of Systems   Constitutional: Negative.    HENT: Negative.     Eyes: Negative.    Respiratory: Negative.     Cardiovascular: Negative.    Gastrointestinal: Negative.    Endocrine: Negative.    Genitourinary:  Positive for vaginal discharge.   Musculoskeletal: Negative.    Neurological: Negative.    Psychiatric/Behavioral: Negative.            PHYSICAL EXAM:    /78   Pulse 88   LMP 03/08/2024   Patient's last menstrual period was 03/08/2024.    Declines chaperone for physical exam.      Well developed, well nourished, in no apparent distress.   Neurologic/Psychiatric:  Awake, Alert and Oriented times 3.  Affect normal.     GENITAL/URINARY:     External Genitalia:  The patient has normal appearing external genitalia, normal skenes and bartholins glands, and a normal hair distribution.  Her vulva is without lesions, erythema or discharge.  It is non-tender with appropriate sensation.     Urethral Meatus:  Size normal, Location normal, Lesions absent, Prolapse absent.     Urethra:  Fullness absent, Masses absent.     Bladder:  Fullness absent, Masses absent, Tenderness absent.     Vagina:  General appearance normal, Estrogen effect normal, Discharge absent, Lesions absent. Scant amount of thin white vaginal discharge, watery consistency.      Cervix: Normal, no discharge.     Anus/Perineum: Normal perineum.     Stress urinary incontinence not demonstrable.       Physical Exam  Genitourinary:      Vaginal discharge present.      Vaginal exam comments: Scant amount of watery white thin vaginal discharge present. .           Rectal exam: Deferred.       Data and DIAGNOSTIC STUDIES REVIEWED   No results found.   No results found for: \"URINECULTURE\", \"UAMICCOMM\"   Lab Results   Component Value Date    GLUCOSE 90 12/31/2023    CALCIUM 9.2 12/31/2023     (L) 12/31/2023    K 3.2 (L) 12/31/2023    CO2 28 12/31/2023     12/31/2023    BUN 10 " 12/31/2023    CREATININE 1.14 (H) 12/31/2023     Lab Results   Component Value Date    WBC 7.0 12/31/2023    HGB 12.4 12/31/2023    HCT 38.1 12/31/2023    MCV 87 12/31/2023     12/31/2023

## 2024-03-17 LAB
BACTERIAL VAGINOSIS VAG-IMP: ABNORMAL
CLUE CELLS VAG LPF-#/AREA: PRESENT /[LPF]
NUGENT SCORE: 6
YEAST VAG WET PREP-#/AREA: ABNORMAL

## 2024-03-18 ENCOUNTER — TELEPHONE (OUTPATIENT)
Dept: OBSTETRICS AND GYNECOLOGY | Facility: CLINIC | Age: 34
End: 2024-03-18
Payer: COMMERCIAL

## 2024-03-18 NOTE — TELEPHONE ENCOUNTER
Message received from Dr Metz that patient wants to cancel surgery 4/2/2024 and does not want to reschedule. Request sent to [facility].

## 2024-03-20 ENCOUNTER — TELEPHONE (OUTPATIENT)
Dept: OBSTETRICS AND GYNECOLOGY | Facility: CLINIC | Age: 34
End: 2024-03-20
Payer: COMMERCIAL

## 2024-03-20 DIAGNOSIS — N76.0 BV (BACTERIAL VAGINOSIS): ICD-10-CM

## 2024-03-20 DIAGNOSIS — B96.89 BV (BACTERIAL VAGINOSIS): ICD-10-CM

## 2024-03-20 RX ORDER — METRONIDAZOLE 7.5 MG/G
GEL VAGINAL NIGHTLY
Qty: 70 G | Refills: 0 | Status: CANCELLED | OUTPATIENT
Start: 2024-03-20 | End: 2024-03-25

## 2024-03-22 RX ORDER — METRONIDAZOLE 500 MG/1
500 TABLET ORAL ONCE
Status: SHIPPED | OUTPATIENT
Start: 2024-03-22

## 2024-03-25 ENCOUNTER — TELEPHONE (OUTPATIENT)
Dept: OBSTETRICS AND GYNECOLOGY | Facility: CLINIC | Age: 34
End: 2024-03-25
Payer: COMMERCIAL

## 2024-03-25 DIAGNOSIS — N76.0 BV (BACTERIAL VAGINOSIS): ICD-10-CM

## 2024-03-25 DIAGNOSIS — B96.89 BV (BACTERIAL VAGINOSIS): ICD-10-CM

## 2024-03-26 RX ORDER — METRONIDAZOLE 500 MG/1
500 TABLET ORAL 2 TIMES DAILY
Qty: 14 TABLET | Refills: 0 | Status: SHIPPED | OUTPATIENT
Start: 2024-03-26 | End: 2024-04-02

## 2024-03-29 NOTE — PROGRESS NOTES
Regular diet     [facility] Department of Urogynecology   Remedios Metz MD, MPH   [phone]    ASSESSMENT AND PLAN:   33 year old female with asymptomatic stage 2 cystocele with some uterine descent, AUB, ARIS, and pelvic pain. Comorbidities include: MICHELLE, HTN, HLD, and tachycardia.     Diagnoses:  #1 Abnormal uterine bleeding  #2 Pelvic pain  #3 Cystocele with some uterine descent, stage 2  #4 Stress urinary incontinence     Plan:  1. AUB  - We reviewed her pelvic U/S results from July 2023 which showed a normal sized uterus at 9cm and a normal endometrial thickness of 8mm.   - Given that her AUB is not well-managed on po Norethindrone we recommended either continuing po Aygestin, consideration of IUD placement, other OCPs, or endometrial sampling (I.e. in office EMB). If her EMB results are normal/benign then we recommend placing an IUD to help manage AUB. If she is not interested in IUD placement the only other option to definitively manage AUB would be to consider a vaginal vs. lx hysterectomy; of note, she has completed child bearing as she had a BL tubal ligation x7 years ago.   - She is very interested in pursuing a hysterectomy.   - Plan to schedule an EMB prior to scheduling TVH + USLS and +/- APR. We advised her to take 600-800mg of Ibuprofen/Advil the morning prior to her EMB.     2. Cystocele with some uterine descent, stage 2  - Asymptomatic   - If she is considering pursuing a hysterectomy we would consider pursuing of POP repair (I.e. USLS + TVH).   - Gave her PI handout on the USLS to review and consider.   - Case request sent for vaginal USLS, TVH, +/- APR, possible midurethral sling, and cystoscopy with Dr. Remedios Metz.     3. ARIS  - We discussed that in order to correctly treat her urinary issues she will need UDS to fully assess/diagnose the type of urinary problems she is having (i.e., ARIS vs. ISD vs. OAB). Urodynamics assesses bladder function and capacity, voiding/filling patterns, urethral  strength, assess if there is NARAYANAN, MUCP, etc.   - Counseled on the importance of UDS to evaluate if there will be occult ARIS after POP repair and if there is we would offer her an anti-incontinence procedure at the time of her POP repair (i.e. midurethral sling).  - Gave her PI handout on the TVT to review and consider.     Preop planning  - will need PAT  - will need UDS  - EMB scheduled      Follow up in 1-2 weeks with Dr. Remedios Metz for EMB.     Scribe Attestation  By signing my name below, I, Nitin Dyer, Breezyibe, attest that this documentation has been prepared under the direction and in the presence of Remedios Metz MD MPH on 01/31/2024 at 3:23 PM.     Problem List Items Addressed This Visit       Abnormal uterine bleeding (AUB)     Other Visit Diagnoses       POP-Q stage 2 cystocele    -  Primary    POP-Q stage 2 rectocele        H/O tubal ligation        ARIS (stress urinary incontinence, female)               I spent a total of 60 minutes in face to face and non face to face time.      Remedios Metz MD, MPH, FACOG     I Dr. Metz, personally performed the services described in the documentation as scribed in my presence and confirm it is both complete and accurate.  Remedios Metz MD, MPH, FACOG      New    HISTORY OF PRESENT ILLNESS:   33 year old female presenting as a new patient for evaluation of pelvic pain and abnormal vaginal bleeding.     Records Review:  - 12/13/2023 CBC - 12.4 (normal)  - 7/27/2023 pelvic U/S: 9cm uterus and endometrium is normal thickness measuring 8mm. No acute pelvic pathology to explain AUB.     Prolapse Symptoms:  - Denies feeling a vaginal bulge.     Pelvic Symptoms:   - Endorses pelvic pain.     Urinary Symptoms:  - She reports ARIS with coughing, laughing, or sneezing occasionally.   - No UUI leakage.   - Denies hx of Estrada.     Bowel Symptoms:   - Endorses vaginal bleeding after having a BM; no hemorrhoids or hematochezia.   - Denies ABL or FI episodes.     OBGYN  History:   - , x4   - Weight of largest baby: 8#  - No hx of OASI but did have described first of second degree tear with first delivery.   - Hx of abnormal pap with +HPV and subsequent colposcopy - routinely normal paps since that time.   - Contraception: Continuous OCPs (Norethindrone daily) prescribed by her OBGYN, DENA Kapadia.   - Hx of lx BL tubal ligation 7 years ago after the birth of her youngest daughter.   - She reports persistent vaginal bleeding since 2023. The patient notes she got  in 2023 and noted having postcoital bleeding intermittently over the past 7 months.   - During menses she reports PMS symptoms such as feeling emotional and extreme fatigue. Of note, her CBC is normal so she is not anemic. She has a therapist to help manage her stress.   - Reports previously been evaluated by Zaina Joseph in OBGYN with pelvic U/S that returned unremarkable. She was prescribed OCPs to help manage AUB.   - Her AUB is accompanied with pelvic pain.   - She is not interested in IUD placement to help manage her AUB.   - Patient states she has completed child bearing.       Past Medical History:     - MICHELLE, HTN, HLD, and tachycardia (managed on Metoprolol).   Past Medical History:   Diagnosis Date    Blepharochalasis left eye, unspecified eyelid 2018    Blepharochalasis of left eye    Blepharochalasis left upper eyelid 2017    Blepharochalasis left upper eyelid    Body mass index (BMI) 38.0-38.9, adult 2016    BMI 38.0-38.9,adult    Chronic sinusitis, unspecified 2017    Other sinusitis    Edema of left upper eyelid 2017    Edema of left upper eyelid    Encounter for gynecological examination (general) (routine) without abnormal findings 2017    Encounter for annual routine gynecological examination    Encounter for routine postpartum follow-up 2017    Postpartum care following vaginal delivery    Essential (primary) hypertension  02/07/2017    Benign essential hypertension    Impacted cerumen, left ear 10/13/2017    Impacted cerumen of left ear    Localized edema 10/30/2014    Periorbital edema    Low back pain, unspecified 04/18/2018    Dorsalgia of lumbar region    Obstructive sleep apnea (adult) (pediatric) 10/01/2019    Obstructive sleep apnea of adult    Obstructive sleep apnea (adult) (pediatric) 12/04/2014    Obstructive sleep apnea    Other conditions influencing health status 04/18/2018    Excessive mucus secretion    Other conditions influencing health status     History of recent childbirth    Personal history of other diseases of the circulatory system 10/28/2015    History of cardiac murmur    Personal history of other diseases of the nervous system and sense organs 04/07/2017    History of papilledema    Personal history of other diseases of the nervous system and sense organs 09/23/2015    History of papilledema    Personal history of other diseases of the respiratory system 06/02/2020    History of allergic rhinitis    Personal history of other endocrine, nutritional and metabolic disease 10/30/2015    History of hypokalemia    Personal history of other specified conditions 08/01/2016    History of headache    Personal history of other specified conditions 04/07/2017    History of headache    Personal history of other specified conditions 05/18/2017    History of abdominal pain    Polyp of cervix uteri 03/17/2016    Cervical polyp    Pure hypercholesterolemia, unspecified 02/20/2015    Low-density-lipoid-type (LDL) hyperlipoproteinemia    Scotoma of blind spot area, left eye 04/07/2017    Enlarged blind spot of left eye    Supervision of pregnancy with other poor reproductive or obstetric history, first trimester 12/29/2016    History of pre-eclampsia in prior pregnancy, currently pregnant, first trimester    Unspecified acute lower respiratory infection 12/18/2017    Lower respiratory infection (e.g., bronchitis, pneumonia,  "pneumonitis, pulmonitis)    Unspecified asthma, uncomplicated 06/02/2020    Asthmatic bronchitis    Unspecified visual disturbance 04/07/2017    Transient vision disturbance       Past Surgical History:     - Tubal ligation   Past Surgical History:   Procedure Laterality Date    OTHER SURGICAL HISTORY  02/07/2017    Tubal Ligation After Vaginal Delivery (Same Hospitalization)       Medications:     Prior to Admission medications    Medication Sig Start Date End Date Taking? Authorizing Provider   amLODIPine (Norvasc) 10 mg tablet TAKE 1 TABLET BY MOUTH EVERY DAY 12/20/23  Yes Lavell Garcia MD   blood pressure test kit-large kit 1 device used daily for blood pressure monitoring 11/16/23  Yes Lavell Garcia MD   ibuprofen 600 mg tablet Take 1 tablet (600 mg) by mouth every 6 hours if needed for mild pain (1 - 3).   Yes Historical Provider, MD   metoprolol succinate XL (Toprol-XL) 50 mg 24 hr tablet Take 1 tablet (50 mg) by mouth once daily. Do not crush or chew. 8/8/23  Yes Lavell Garcia MD   norethindrone (Aygestin) 5 mg tablet Take 1 tablet (5 mg) by mouth once daily. 7/20/23  Yes Historical Provider, MD   triamterene-hydrochlorothiazid (Maxzide-25) 37.5-25 mg tablet TAKE 1 TABLET BY MOUTH EVERY DAY 12/20/23  Yes Lavell Garcia MD        ROS  Review of Systems   Constitutional: Negative.    HENT: Negative.     Eyes: Negative.    Respiratory: Negative.     Cardiovascular: Negative.    Gastrointestinal: Negative.    Endocrine: Negative.    Genitourinary:  Positive for vaginal bleeding.   Musculoskeletal: Negative.    Neurological: Negative.    Psychiatric/Behavioral: Negative.            PHYSICAL EXAM:    /80   Pulse 73   Ht 1.753 m (5' 9\")   Wt 111 kg (245 lb)   BMI 36.18 kg/m²   No LMP recorded.    Declines chaperone for physical exam.      Well developed, well nourished, in no apparent distress.   Neurologic/Psychiatric:  Awake, Alert and Oriented times 3.  Affect normal.   Abdomen: Negative " "Carnett's sign, pain with palpation over the abdomen     GENITAL/URINARY:     External Genitalia:  The patient has normal appearing external genitalia, normal skenes and bartholins glands, and a normal hair distribution.  Her vulva is without lesions, erythema or discharge.  It is non-tender with appropriate sensation.     Urethral Meatus:  Size normal, Location normal, Lesions absent, Prolapse absent.    Urethra:  Fullness absent, Masses absent. Negative CST in the supine position.     Bladder:  Fullness absent, Masses absent, Tenderness absent.    Vagina:  General appearance normal, Estrogen effect normal, Discharge absent, Lesions absent.      Cervix: Normal appearance. Small cervical ectropion, small amount of blood coming from the ectropion.   Uterus:  normal size, mobile, and nontender    Anus/Perineum: Normal perineum.     Stress urinary incontinence not demonstrable.       Physical Exam  Genitourinary:      Bladder and urethral meatus normal.      Anterior vaginal prolapse present.     No vaginal atrophy present.       Right Adnexa: not tender and no mass present.     Left Adnexa: not tender and no mass present.     Cervical exam comments: Small cervical ectropion, small amount of blood coming from the ectropion.      No urethral stress urinary incontinence with cough stress test present.   POP-Q measurements were:      Aa: 0, Ba: 0, C: -3     gH: 5.5, pB: 4, TVL:     Ap: -2, Bp: -2, D: -7     Pelvic exam was performed with patient in the lithotomy position.         She does not have myofascial tenderness on exam.          Rectal exam: Deferred.       Data and DIAGNOSTIC STUDIES REVIEWED   No results found.   No results found for: \"URINECULTURE\", \"UAMICCOMM\"   Lab Results   Component Value Date    GLUCOSE 90 12/31/2023    CALCIUM 9.2 12/31/2023     (L) 12/31/2023    K 3.2 (L) 12/31/2023    CO2 28 12/31/2023     12/31/2023    BUN 10 12/31/2023    CREATININE 1.14 (H) 12/31/2023     Lab Results "   Component Value Date    WBC 7.0 12/31/2023    HGB 12.4 12/31/2023    HCT 38.1 12/31/2023    MCV 87 12/31/2023     12/31/2023

## 2024-04-03 ENCOUNTER — TELEPHONE (OUTPATIENT)
Dept: OBSTETRICS AND GYNECOLOGY | Facility: CLINIC | Age: 34
End: 2024-04-03

## 2024-04-08 DIAGNOSIS — I10 ESSENTIAL (PRIMARY) HYPERTENSION: ICD-10-CM

## 2024-04-08 DIAGNOSIS — I10 PRIMARY HYPERTENSION: ICD-10-CM

## 2024-04-08 NOTE — TELEPHONE ENCOUNTER
December 3, 2018     Patient: Deon Boyer   YOB: 1988   Date of Visit: 12/3/2018       To Whom it May Concern:    Deon Boyer was seen in my clinic on 12/3/2018 at 12:00 pm. Please excuse Deon for his absence from work on this day to make the appointment.  He is cleared to return to work on Monday, December 10, 2018 without restrictions.  Also, he is scheduled for surgery again on January 3, 2019.    If you have any questions or concerns, please don't hesitate to call.      Sincerely,         Kuldeep Burr MD        CC: No Recipients     [facility]

## 2024-04-10 RX ORDER — AMLODIPINE BESYLATE 10 MG/1
10 TABLET ORAL DAILY
Qty: 90 TABLET | Refills: 1 | Status: SHIPPED | OUTPATIENT
Start: 2024-04-10 | End: 2024-05-28 | Stop reason: SDUPTHER

## 2024-04-10 RX ORDER — TRIAMTERENE/HYDROCHLOROTHIAZID 37.5-25 MG
1 TABLET ORAL DAILY
Qty: 90 TABLET | Refills: 1 | Status: SHIPPED | OUTPATIENT
Start: 2024-04-10 | End: 2024-05-28 | Stop reason: SDUPTHER

## 2024-04-10 RX ORDER — METOPROLOL SUCCINATE 50 MG/1
50 TABLET, EXTENDED RELEASE ORAL DAILY
Qty: 90 TABLET | Refills: 2 | Status: SHIPPED | OUTPATIENT
Start: 2024-04-10 | End: 2024-05-28 | Stop reason: SDUPTHER

## 2024-05-28 ENCOUNTER — OFFICE VISIT (OUTPATIENT)
Dept: PRIMARY CARE | Facility: CLINIC | Age: 34
End: 2024-05-28
Payer: COMMERCIAL

## 2024-05-28 VITALS
HEART RATE: 106 BPM | WEIGHT: 248.6 LBS | BODY MASS INDEX: 36.82 KG/M2 | SYSTOLIC BLOOD PRESSURE: 118 MMHG | DIASTOLIC BLOOD PRESSURE: 78 MMHG | HEIGHT: 69 IN

## 2024-05-28 DIAGNOSIS — G47.33 OSA ON CPAP: ICD-10-CM

## 2024-05-28 DIAGNOSIS — I10 ESSENTIAL (PRIMARY) HYPERTENSION: ICD-10-CM

## 2024-05-28 DIAGNOSIS — Z00.00 ANNUAL PHYSICAL EXAM: Primary | ICD-10-CM

## 2024-05-28 DIAGNOSIS — Z71.3 ENCOUNTER FOR WEIGHT LOSS COUNSELING: ICD-10-CM

## 2024-05-28 DIAGNOSIS — I10 PRIMARY HYPERTENSION: ICD-10-CM

## 2024-05-28 PROCEDURE — 3008F BODY MASS INDEX DOCD: CPT | Performed by: INTERNAL MEDICINE

## 2024-05-28 PROCEDURE — 3074F SYST BP LT 130 MM HG: CPT | Performed by: INTERNAL MEDICINE

## 2024-05-28 PROCEDURE — 99395 PREV VISIT EST AGE 18-39: CPT | Performed by: INTERNAL MEDICINE

## 2024-05-28 PROCEDURE — 1036F TOBACCO NON-USER: CPT | Performed by: INTERNAL MEDICINE

## 2024-05-28 PROCEDURE — 99213 OFFICE O/P EST LOW 20 MIN: CPT | Performed by: INTERNAL MEDICINE

## 2024-05-28 PROCEDURE — 3078F DIAST BP <80 MM HG: CPT | Performed by: INTERNAL MEDICINE

## 2024-05-28 RX ORDER — PHENTERMINE HYDROCHLORIDE 37.5 MG/1
37.5 CAPSULE ORAL
Qty: 30 CAPSULE | Refills: 0 | Status: SHIPPED | OUTPATIENT
Start: 2024-05-28 | End: 2024-06-27

## 2024-05-28 RX ORDER — AMLODIPINE BESYLATE 10 MG/1
10 TABLET ORAL DAILY
Qty: 90 TABLET | Refills: 3 | Status: SHIPPED | OUTPATIENT
Start: 2024-05-28

## 2024-05-28 RX ORDER — TRIAMTERENE/HYDROCHLOROTHIAZID 37.5-25 MG
1 TABLET ORAL DAILY
Qty: 90 TABLET | Refills: 3 | Status: SHIPPED | OUTPATIENT
Start: 2024-05-28

## 2024-05-28 RX ORDER — METOPROLOL SUCCINATE 50 MG/1
50 TABLET, EXTENDED RELEASE ORAL DAILY
Qty: 90 TABLET | Refills: 3 | Status: SHIPPED | OUTPATIENT
Start: 2024-05-28

## 2024-05-28 ASSESSMENT — ENCOUNTER SYMPTOMS
PALPITATIONS: 0
POLYDIPSIA: 0
COUGH: 0
FEVER: 0
CHILLS: 0
SHORTNESS OF BREATH: 0

## 2024-05-28 ASSESSMENT — PAIN SCALES - GENERAL: PAINLEVEL: 0-NO PAIN

## 2024-05-28 NOTE — PATIENT INSTRUCTIONS
Please consider the following information on healthy lifestyle choices:  We encourage a diet that is low in refined sugar as well as saturated fats.  Saturated fats are commonly found in fried foods, high fat dairy products like milk creams cheeses and butters, as well as red meat.    The goal for healthy exercise would be to target 100 minutes or more per week of exercise for the sake of exercise.  This can be a mixture of cardiovascular exercise in the form of walking running jogging swimming etc., or strength training exercise.  There are independent benefits of cardiovascular based exercise and reducing the chances of heart disease in a lifetime.    Healthy weight is always a benefit but individuals will have different goals and healthy weight targets.  We want you to be the best version of yourself.  It is important to find a balance between your calories in and your calories out through exercise and lifestyle.  This is difficult and there is no one universal truth here.  The best place to start is oftentimes with figuring out the reality of the calories you eat with a calorie counting fide or assistant tool and finding ways to moderate or control calories in combination with healthy lifestyle choices like exercise.    Staying on top of vaccinations is an important step in long-term preventative health and preventing unnecessary illness.  Please consider any recommendations we discussed in our appointment, flu and covid every fall.     Cancer screening is absolutely paramount to excellent preventative health.  I will work with you to educate you about your options and timeframes for next screening updates.  If we ordered or discussed screening as part of our visit please take those considerations seriously and perform the testing we discussed.

## 2024-05-28 NOTE — PROGRESS NOTES
Subjective   Patient ID: Tatiana Prabhakar is a 33 y.o. female who presents for Annual Exam.    OARRS:  No data recorded  I have personally reviewed the OARRS report for Tatiana Prabhakar. I have considered the risks of abuse, dependence, addiction and diversion and I believe that it is clinically appropriate for Tatiana Prabhakar to be prescribed this medication    Is the patient prescribed a combination of a benzodiazepine and opioid?  No    Last Urine Drug Screen / ordered today: No  No results found for this or any previous visit (from the past 8760 hour(s)).  N/A      Controlled Substance Agreement:  Date of the Last Agreement: 05/28/24  Reviewed Controlled Substance Agreement including but not limited to the benefits, risks, and alternatives to treatment with a Controlled Substance medication(s).    Anorexiants:   What is the patient's goal of therapy? Assistance with weight loss  Is this being achieved with current treatment? TBD    I have assessed the patient's continuing efforts to lose weight., I have assessed the patient's dedication to the treatment program and the response to treatment., and I have assessed the presence or absence of contraindications, adverse effects, and indicators of possible substance abuse that would necessitate cessation of treatment utilizing controlled substance.    Patient has demonstrated continued efforts to lose weight, is dedicated to the treatment program and the response to treatment. and I have assessed for the presence or absence of contraindications, adverse effects, and indicators of possible substance abuse that would necessitate cessation of treatment utilizing controlled substance.    Activities of Daily Living:   Is your overall impression that this patient is benefiting (symptom reduction outweighs side effects) from anorexiants therapy? Yes     1. Physical Functioning: Same  2. Family Relationship: Same  3. Social Relationship: Same  4. Mood: Same  5. Sleep Patterns:  "Same  6. Overall Function: Same    Contraceptive: Tubal Ligation    Patient presents today for an annual wellness exam    Medical history and surgical history updated today  Medication list reconciled and updated  Patient denies vision issues at this time  Patient denies hearing issues at this time  Current diet: balanced but working towards weight loss, counseled.   Current exercise habit: none currently, needs to adjust but time demands of work, child, and school make this currently very challenging.   Follows with a dentist: Yes, but due now.     Patient counseled about available preventative health vaccinations and provided with opportunity to update them with our office or through prescription to preferred pharmacy.    Reviewed and discussed preventative health screening recommendations for colon cancer: age 43    Reviewed and discussed preventative health recommendations for screening for cervical cancer and breast cancer: age 38    Currently dx with MICHELLE on CPAP. Medically benefiting from treatment but currently without device and awaiting new treatment device post recall. Prior to recall she was using device night at bedtime, with mask sometimes falling off. She was using it at least 4 hours. Needs a good mask fitting with new device.              Review of Systems   Constitutional:  Negative for chills and fever.   Respiratory:  Negative for cough and shortness of breath.    Cardiovascular:  Negative for chest pain and palpitations.   Endocrine: Negative for polydipsia and polyuria.       Objective   /78   Pulse 106   Ht 1.753 m (5' 9\")   Wt 113 kg (248 lb 9.6 oz)   BMI 36.71 kg/m²     Physical Exam  Constitutional:       Appearance: Normal appearance.   HENT:      Head: Normocephalic and atraumatic.      Right Ear: Tympanic membrane normal.      Left Ear: Tympanic membrane normal.      Nose: Nose normal.      Mouth/Throat:      Mouth: Mucous membranes are moist.   Eyes:      Extraocular Movements: " Extraocular movements intact.      Conjunctiva/sclera: Conjunctivae normal.      Pupils: Pupils are equal, round, and reactive to light.   Neck:      Thyroid: No thyroid mass, thyromegaly or thyroid tenderness.      Vascular: No carotid bruit.   Cardiovascular:      Rate and Rhythm: Normal rate and regular rhythm.      Heart sounds: No murmur heard.     No friction rub. No gallop.   Pulmonary:      Effort: Pulmonary effort is normal. No respiratory distress.      Breath sounds: No wheezing, rhonchi or rales.   Abdominal:      General: Bowel sounds are normal.      Palpations: Abdomen is soft.      Tenderness: There is no abdominal tenderness. There is no guarding.      Hernia: No hernia is present.   Musculoskeletal:      Cervical back: Neck supple. No tenderness.      Right lower leg: No edema.      Left lower leg: No edema.   Lymphadenopathy:      Cervical: No cervical adenopathy.   Skin:     Coloration: Skin is not jaundiced.   Neurological:      General: No focal deficit present.      Mental Status: She is alert and oriented to person, place, and time.   Psychiatric:         Mood and Affect: Mood normal.         Assessment/Plan   Problem List Items Addressed This Visit             ICD-10-CM    Primary hypertension I10    Relevant Medications    amLODIPine (Norvasc) 10 mg tablet    triamterene-hydrochlorothiazid (Maxzide-25) 37.5-25 mg tablet    MICHELLE on CPAP G47.33     Other Visit Diagnoses         Codes    Annual physical exam    -  Primary Z00.00    Essential (primary) hypertension     I10    Relevant Medications    metoprolol succinate XL (Toprol-XL) 50 mg 24 hr tablet    Encounter for weight loss counseling     Z71.3    Relevant Medications    phentermine 37.5 mg capsule    BMI 36.0-36.9,adult     Z68.36    Relevant Medications    phentermine 37.5 mg capsule

## 2024-06-13 ENCOUNTER — APPOINTMENT (OUTPATIENT)
Dept: PRIMARY CARE | Facility: CLINIC | Age: 34
End: 2024-06-13
Payer: COMMERCIAL

## 2024-06-14 ENCOUNTER — TELEPHONE (OUTPATIENT)
Dept: PRIMARY CARE | Facility: CLINIC | Age: 34
End: 2024-06-14
Payer: COMMERCIAL

## 2024-06-14 NOTE — TELEPHONE ENCOUNTER
Yesterday patient blood pressure was 154/95 144/90 120/80. She has been of her amlodipine for a few days because her pills got wet and she had to pay out of pocket for the rx and couldn't so she had to go with out. She just wanted to make you aware because she was concerned with the numbers. She can now fill her Amlodipine because it's due.

## 2024-06-28 ENCOUNTER — APPOINTMENT (OUTPATIENT)
Dept: PRIMARY CARE | Facility: CLINIC | Age: 34
End: 2024-06-28
Payer: COMMERCIAL

## 2024-06-28 VITALS
BODY MASS INDEX: 35.12 KG/M2 | HEART RATE: 103 BPM | SYSTOLIC BLOOD PRESSURE: 108 MMHG | DIASTOLIC BLOOD PRESSURE: 74 MMHG | WEIGHT: 237.8 LBS | TEMPERATURE: 97.4 F

## 2024-06-28 DIAGNOSIS — E66.01 CLASS 2 SEVERE OBESITY DUE TO EXCESS CALORIES WITH SERIOUS COMORBIDITY AND BODY MASS INDEX (BMI) OF 36.0 TO 36.9 IN ADULT (MULTI): Primary | ICD-10-CM

## 2024-06-28 DIAGNOSIS — Z71.3 ENCOUNTER FOR WEIGHT LOSS COUNSELING: ICD-10-CM

## 2024-06-28 DIAGNOSIS — R63.5 WEIGHT GAIN: ICD-10-CM

## 2024-06-28 DIAGNOSIS — K59.01 SLOW TRANSIT CONSTIPATION: ICD-10-CM

## 2024-06-28 PROCEDURE — 99214 OFFICE O/P EST MOD 30 MIN: CPT | Performed by: INTERNAL MEDICINE

## 2024-06-28 PROCEDURE — 3074F SYST BP LT 130 MM HG: CPT | Performed by: INTERNAL MEDICINE

## 2024-06-28 PROCEDURE — 1036F TOBACCO NON-USER: CPT | Performed by: INTERNAL MEDICINE

## 2024-06-28 PROCEDURE — 3078F DIAST BP <80 MM HG: CPT | Performed by: INTERNAL MEDICINE

## 2024-06-28 PROCEDURE — 3008F BODY MASS INDEX DOCD: CPT | Performed by: INTERNAL MEDICINE

## 2024-06-28 RX ORDER — SENNOSIDES 8.6 MG/1
2 TABLET ORAL NIGHTLY PRN
Qty: 30 TABLET | Refills: 0 | Status: SHIPPED | OUTPATIENT
Start: 2024-06-28 | End: 2024-07-13

## 2024-06-28 RX ORDER — PHENTERMINE HYDROCHLORIDE 37.5 MG/1
37.5 CAPSULE ORAL
Qty: 30 CAPSULE | Refills: 0 | Status: SHIPPED | OUTPATIENT
Start: 2024-07-22 | End: 2024-08-21

## 2024-06-28 RX ORDER — PHENTERMINE HYDROCHLORIDE 37.5 MG/1
37.5 CAPSULE ORAL
Qty: 30 CAPSULE | Refills: 0 | Status: SHIPPED | OUTPATIENT
Start: 2024-06-28 | End: 2024-07-28

## 2024-06-28 RX ORDER — PHENTERMINE HYDROCHLORIDE 37.5 MG/1
37.5 CAPSULE ORAL
Qty: 30 CAPSULE | Refills: 0 | Status: SHIPPED | OUTPATIENT
Start: 2024-08-22 | End: 2024-09-21

## 2024-06-28 RX ORDER — DOCUSATE SODIUM 250 MG
1 CAPSULE ORAL DAILY
Qty: 90 CAPSULE | Refills: 0 | Status: SHIPPED | OUTPATIENT
Start: 2024-06-28 | End: 2024-09-26

## 2024-06-28 ASSESSMENT — ENCOUNTER SYMPTOMS
SHORTNESS OF BREATH: 0
COUGH: 0
PALPITATIONS: 0
CONSTIPATION: 1
FEVER: 0
POLYDIPSIA: 0
CHILLS: 0

## 2024-06-28 NOTE — PROGRESS NOTES
Subjective   Patient ID: Tatiana Prabhakar is a 33 y.o. female who presents for Follow-up ([facility]).    33-year-old female presents today for follow-up on weight loss care.  Her last encounter we discussed the risks and benefits of phentermine and initiated that medication for her assistance in managing and losing weight with a BMI of 36.  Since that encounter she is doing very well with the medication and some aggressive early weight loss tone of 11 pounds.  She has been working hard to make lifestyle changes including portion control, changes to her dietary choices, and improving upon and increasing her exercise all consistent with my recommendations at the last encounter.  She been tolerating the medication well with no palpitations headaches insomnia, she did see and has been experiencing some intermittent constipation which we discussed and worked on today for medication adjustments and guidance about how to control it better through diet and lifestyle. At this time is not significant enough to consider discontinuing the medication, especially in the setting of the patient's significant benefits.  Follow-up refills have been provided to her pharmacy and advising we are good to continue the medication at this time and the patient is going to follow-up with me in 3 months.      OARRS:  No data recorded  I have personally reviewed the OARRS report for Tatiana Prabhakar. I have considered the risks of abuse, dependence, addiction and diversion and I believe that it is clinically appropriate for Tatiana Prabhakar to be prescribed this medication    Is the patient prescribed a combination of a benzodiazepine and opioid?  No    Last Urine Drug Screen / ordered today: No  No results found for this or any previous visit (from the past 8760 hour(s)).  N/A    Controlled Substance Agreement:  Date of the Last Agreement: prior appt  Reviewed Controlled Substance Agreement including but not limited to the benefits, risks, and  alternatives to treatment with a Controlled Substance medication(s).    Anorexiants:   What is the patient's goal of therapy? Weight loss  Is this being achieved with current treatment? Yes    I have assessed the patient's continuing efforts to lose weight., I have assessed the patient's dedication to the treatment program and the response to treatment., and I have assessed the presence or absence of contraindications, adverse effects, and indicators of possible substance abuse that would necessitate cessation of treatment utilizing controlled substance.    Patient has demonstrated continued efforts to lose weight, is dedicated to the treatment program and the response to treatment. and I have assessed for the presence or absence of contraindications, adverse effects, and indicators of possible substance abuse that would necessitate cessation of treatment utilizing controlled substance.    Activities of Daily Living:   Is your overall impression that this patient is benefiting (symptom reduction outweighs side effects) from anorexiants therapy? Yes                        Review of Systems   Constitutional:  Negative for chills and fever.   Respiratory:  Negative for cough and shortness of breath.    Cardiovascular:  Negative for chest pain and palpitations.   Gastrointestinal:  Positive for constipation.   Endocrine: Negative for polydipsia and polyuria.       Objective   /74 (BP Location: Right arm, Patient Position: Sitting, BP Cuff Size: Large adult)   Pulse 103   Temp 36.3 °C (97.4 °F) (Temporal)   Wt 108 kg (237 lb 12.8 oz)   BMI 35.12 kg/m²     Physical Exam  Constitutional:       Appearance: Normal appearance.   HENT:      Head: Normocephalic and atraumatic.   Eyes:      Extraocular Movements: Extraocular movements intact.      Pupils: Pupils are equal, round, and reactive to light.   Neck:      Thyroid: No thyroid mass or thyromegaly.   Cardiovascular:      Rate and Rhythm: Normal rate and regular  rhythm.   Pulmonary:      Effort: No respiratory distress.   Musculoskeletal:      Cervical back: Neck supple.      Right lower leg: No edema.      Left lower leg: No edema.   Neurological:      Mental Status: She is alert.         Assessment/Plan   Problem List Items Addressed This Visit             ICD-10-CM    Obesity - Primary E66.9    Relevant Medications    phentermine 37.5 mg capsule (Start on 7/22/2024)    phentermine 37.5 mg capsule (Start on 8/22/2024)    Weight gain R63.5    Relevant Medications    phentermine 37.5 mg capsule (Start on 7/22/2024)    phentermine 37.5 mg capsule (Start on 8/22/2024)     Other Visit Diagnoses         Codes    BMI 36.0-36.9,adult     Z68.36    Relevant Medications    phentermine 37.5 mg capsule    phentermine 37.5 mg capsule (Start on 7/22/2024)    phentermine 37.5 mg capsule (Start on 8/22/2024)    Slow transit constipation     K59.01    Relevant Medications    docusate sodium 250 mg capsule    sennosides (senna) 8.6 mg tablet    Encounter for weight loss counseling     Z71.3    Relevant Medications    phentermine 37.5 mg capsule

## 2024-07-02 ENCOUNTER — APPOINTMENT (OUTPATIENT)
Dept: OBSTETRICS AND GYNECOLOGY | Facility: CLINIC | Age: 34
End: 2024-07-02
Payer: COMMERCIAL

## 2024-07-02 ENCOUNTER — LAB (OUTPATIENT)
Dept: LAB | Facility: LAB | Age: 34
End: 2024-07-02
Payer: COMMERCIAL

## 2024-07-02 VITALS
DIASTOLIC BLOOD PRESSURE: 83 MMHG | WEIGHT: 231 LBS | HEIGHT: 69 IN | SYSTOLIC BLOOD PRESSURE: 122 MMHG | BODY MASS INDEX: 34.21 KG/M2

## 2024-07-02 DIAGNOSIS — Z01.419 WELL WOMAN EXAM WITH ROUTINE GYNECOLOGICAL EXAM: ICD-10-CM

## 2024-07-02 DIAGNOSIS — Z20.2 POSSIBLE EXPOSURE TO STD: ICD-10-CM

## 2024-07-02 DIAGNOSIS — Z20.2 POSSIBLE EXPOSURE TO STD: Primary | ICD-10-CM

## 2024-07-02 PROBLEM — H02.844 EDEMA OF LEFT UPPER EYELID: Status: ACTIVE | Noted: 2024-07-02

## 2024-07-02 PROCEDURE — 87661 TRICHOMONAS VAGINALIS AMPLIF: CPT

## 2024-07-02 PROCEDURE — 3008F BODY MASS INDEX DOCD: CPT | Performed by: ADVANCED PRACTICE MIDWIFE

## 2024-07-02 PROCEDURE — 87591 N.GONORRHOEAE DNA AMP PROB: CPT

## 2024-07-02 PROCEDURE — 99395 PREV VISIT EST AGE 18-39: CPT | Performed by: ADVANCED PRACTICE MIDWIFE

## 2024-07-02 PROCEDURE — 86780 TREPONEMA PALLIDUM: CPT

## 2024-07-02 PROCEDURE — 87624 HPV HI-RISK TYP POOLED RSLT: CPT

## 2024-07-02 PROCEDURE — 3074F SYST BP LT 130 MM HG: CPT | Performed by: ADVANCED PRACTICE MIDWIFE

## 2024-07-02 PROCEDURE — 1036F TOBACCO NON-USER: CPT | Performed by: ADVANCED PRACTICE MIDWIFE

## 2024-07-02 PROCEDURE — 86803 HEPATITIS C AB TEST: CPT

## 2024-07-02 PROCEDURE — 87389 HIV-1 AG W/HIV-1&-2 AB AG IA: CPT

## 2024-07-02 PROCEDURE — 87205 SMEAR GRAM STAIN: CPT

## 2024-07-02 PROCEDURE — 3079F DIAST BP 80-89 MM HG: CPT | Performed by: ADVANCED PRACTICE MIDWIFE

## 2024-07-02 PROCEDURE — 36415 COLL VENOUS BLD VENIPUNCTURE: CPT

## 2024-07-02 PROCEDURE — 87491 CHLMYD TRACH DNA AMP PROBE: CPT

## 2024-07-02 PROCEDURE — 87340 HEPATITIS B SURFACE AG IA: CPT

## 2024-07-02 ASSESSMENT — PATIENT HEALTH QUESTIONNAIRE - PHQ9
2. FEELING DOWN, DEPRESSED OR HOPELESS: NOT AT ALL
SUM OF ALL RESPONSES TO PHQ9 QUESTIONS 1 AND 2: 0
1. LITTLE INTEREST OR PLEASURE IN DOING THINGS: NOT AT ALL

## 2024-07-02 NOTE — PROGRESS NOTES
"Assessment/Plan   Problem List Items Addressed This Visit    None      Perla Redding, APRN-CNM     Subjective   Tatiana Prabhakar is a 33 y.o. female who is here for a routine exam.     Concerns today:  Recurrent BV    Patient's last menstrual period was 2024 (approximate).   Periods are regular every 28-30 days, lasting 5 days.   Dysmenorrhea:mild, occurring first 1-2 days of flow.   Cyclic symptoms include bloating and breast tenderness.     Sexual Activity: sexually active, male partners; Patient reports 2 partners in the last 12 months.  Pain with intercourse? Yes   Loss of desire? No   Able to have an orgasm? Yes     History of prior STI: none    Current contraception: tubal ligation    Last pap:   History of abnormal Pap smear: yes - remote history  Family history of uterine or ovarian cancer: yes - her mother    Last mammogram: N/A  History of abnormal mammogram: no  Family history of breast cancer: no  Menstrual History:  OB History          4    Para   4    Term   4       0    AB   0    Living   4         SAB   0    IAB   0    Ectopic   0    Multiple        Live Births   4                Menarche age: 9  Patient's last menstrual period was 2024 (approximate).       Objective   /83   Ht 1.753 m (5' 9\")   Wt 105 kg (231 lb)   LMP 2024 (Approximate)   BMI 34.11 kg/m²     Colonoscopy: NA    Diet: Real food    Exercise: Walking every day   Physical Exam  Constitutional:       Appearance: Normal appearance.   Genitourinary:      Vulva and rectum normal.      Genitourinary Comments: Abnormal appearing cervix with areas of possible lesion noted. Pap collected today.    HENT:      Head: Normocephalic.      Nose: Nose normal.      Mouth/Throat:      Mouth: Mucous membranes are moist.   Eyes:      Pupils: Pupils are equal, round, and reactive to light.   Cardiovascular:      Rate and Rhythm: Normal rate.      Pulses: Normal pulses.   Pulmonary:      Effort: " Pulmonary effort is normal.   Abdominal:      General: Abdomen is flat.      Palpations: Abdomen is soft.   Musculoskeletal:         General: Normal range of motion.      Cervical back: Normal range of motion and neck supple.   Neurological:      General: No focal deficit present.      Mental Status: She is alert and oriented to person, place, and time.   Skin:     General: Skin is warm.   Psychiatric:         Mood and Affect: Mood normal.         Behavior: Behavior normal. Behavior is cooperative.   Vitals reviewed.     Pap with HPV, m gen, gc/ct, vaginitis panel sent today  Will call if abnormal.

## 2024-07-03 LAB
BACTERIAL VAGINOSIS VAG-IMP: NORMAL
C TRACH RRNA SPEC QL NAA+PROBE: NEGATIVE
CLUE CELLS VAG LPF-#/AREA: NORMAL /[LPF]
HBV SURFACE AG SERPL QL IA: NONREACTIVE
HCV AB SER QL: NONREACTIVE
HIV 1+2 AB+HIV1 P24 AG SERPL QL IA: NONREACTIVE
N GONORRHOEA DNA SPEC QL PROBE+SIG AMP: NEGATIVE
NUGENT SCORE: 4
T VAGINALIS RRNA SPEC QL NAA+PROBE: NEGATIVE
TREPONEMA PALLIDUM IGG+IGM AB [PRESENCE] IN SERUM OR PLASMA BY IMMUNOASSAY: NONREACTIVE
YEAST VAG WET PREP-#/AREA: NORMAL

## 2024-07-08 LAB — SCAN RESULT: NORMAL

## 2024-07-10 ENCOUNTER — TELEPHONE (OUTPATIENT)
Dept: OBSTETRICS AND GYNECOLOGY | Facility: CLINIC | Age: 34
End: 2024-07-10
Payer: COMMERCIAL

## 2024-07-10 LAB
CYTOLOGY CMNT CVX/VAG CYTO-IMP: NORMAL
HPV HR 12 DNA GENITAL QL NAA+PROBE: POSITIVE
HPV HR GENOTYPES PNL CVX NAA+PROBE: POSITIVE
HPV16 DNA SPEC QL NAA+PROBE: NEGATIVE
HPV18 DNA SPEC QL NAA+PROBE: NEGATIVE
LAB AP HPV GENOTYPE QUESTION: YES
LAB AP HPV HR: NORMAL
LABORATORY COMMENT REPORT: NORMAL
LMP START DATE: NORMAL
PATH REPORT.TOTAL CANCER: NORMAL

## 2024-07-10 NOTE — TELEPHONE ENCOUNTER
Tatiana Prabhakar called back stating that she would like a call back as her phone .    KIRAN SORIA MA

## 2024-07-10 NOTE — TELEPHONE ENCOUNTER
I reached out to the patient to have her schedule her appointment for a Colpo and patient wants further understanding before she schedules her appointment.    KIRAN SORIA MA

## 2024-07-10 NOTE — TELEPHONE ENCOUNTER
----- Message from Marika REYES sent at 7/10/2024 12:06 PM EDT -----  Need colpo appointment with Jeramy

## 2024-07-10 NOTE — TELEPHONE ENCOUNTER
Tatiana Prabhakar wants to speak with the doctor instead of the nurse in regards to her results.    KIRAN SORIA MA

## 2024-07-16 ENCOUNTER — APPOINTMENT (OUTPATIENT)
Dept: PRIMARY CARE | Facility: CLINIC | Age: 34
End: 2024-07-16
Payer: COMMERCIAL

## 2024-07-16 DIAGNOSIS — N76.0 BACTERIAL VAGINOSIS: ICD-10-CM

## 2024-07-16 DIAGNOSIS — B96.89 BACTERIAL VAGINOSIS: ICD-10-CM

## 2024-07-16 DIAGNOSIS — Z20.2 POSSIBLE EXPOSURE TO SEXUALLY TRANSMITTED INFECTION: Primary | ICD-10-CM

## 2024-07-16 RX ORDER — METRONIDAZOLE 500 MG/1
500 TABLET ORAL 2 TIMES DAILY
Qty: 14 TABLET | Refills: 0 | Status: SHIPPED | OUTPATIENT
Start: 2024-07-16 | End: 2024-07-23

## 2024-07-16 NOTE — PROGRESS NOTES
Patient calling in for possible exposure to STD since last visit. New order for GC/Ct and trich placed.   Flagyl 500 mg bid x 7 days sent in for positive vaginitis panel.

## 2024-08-07 PROBLEM — R00.0 TACHYCARDIA: Status: RESOLVED | Noted: 2023-05-11 | Resolved: 2024-08-07

## 2024-08-07 PROBLEM — R63.5 WEIGHT GAIN: Status: RESOLVED | Noted: 2023-05-11 | Resolved: 2024-08-07

## 2024-08-07 PROBLEM — Q67.8: Status: RESOLVED | Noted: 2023-05-11 | Resolved: 2024-08-07

## 2024-08-07 PROBLEM — R10.2 FEMALE PELVIC PAIN: Status: RESOLVED | Noted: 2023-05-11 | Resolved: 2024-08-07

## 2024-08-07 PROBLEM — R60.0 LEG EDEMA, LEFT: Status: RESOLVED | Noted: 2023-05-11 | Resolved: 2024-08-07

## 2024-08-07 PROBLEM — G93.2 BENIGN INTRACRANIAL HYPERTENSION: Status: RESOLVED | Noted: 2023-05-11 | Resolved: 2024-08-07

## 2024-08-07 PROBLEM — N92.6 IRREGULAR MENSTRUAL CYCLE: Status: RESOLVED | Noted: 2023-05-11 | Resolved: 2024-08-07

## 2024-08-07 PROBLEM — R59.0 CERVICAL LYMPHADENOPATHY: Status: RESOLVED | Noted: 2023-05-11 | Resolved: 2024-08-07

## 2024-08-07 PROBLEM — E55.9 VITAMIN D DEFICIENCY: Status: RESOLVED | Noted: 2023-05-11 | Resolved: 2024-08-07

## 2024-08-07 PROBLEM — G93.2 IIH (IDIOPATHIC INTRACRANIAL HYPERTENSION): Status: RESOLVED | Noted: 2023-09-15 | Resolved: 2024-08-07

## 2024-08-07 PROBLEM — H02.844 EDEMA OF LEFT UPPER EYELID: Status: RESOLVED | Noted: 2024-07-02 | Resolved: 2024-08-07

## 2024-08-07 PROBLEM — R06.02 SOB (SHORTNESS OF BREATH) ON EXERTION: Status: RESOLVED | Noted: 2023-05-11 | Resolved: 2024-08-07

## 2024-08-07 PROBLEM — I35.8 HEART MURMUR, AORTIC: Status: RESOLVED | Noted: 2023-05-11 | Resolved: 2024-08-07

## 2024-08-07 PROBLEM — N93.9 ABNORMAL UTERINE BLEEDING (AUB): Status: RESOLVED | Noted: 2023-05-11 | Resolved: 2024-08-07

## 2024-08-07 PROBLEM — B37.31 YEAST VAGINITIS: Status: RESOLVED | Noted: 2023-05-11 | Resolved: 2024-08-07

## 2024-08-08 ENCOUNTER — APPOINTMENT (OUTPATIENT)
Dept: OBSTETRICS AND GYNECOLOGY | Facility: CLINIC | Age: 34
End: 2024-08-08
Payer: COMMERCIAL

## 2024-08-08 VITALS
BODY MASS INDEX: 33.77 KG/M2 | WEIGHT: 228 LBS | HEIGHT: 69 IN | DIASTOLIC BLOOD PRESSURE: 83 MMHG | SYSTOLIC BLOOD PRESSURE: 124 MMHG

## 2024-08-08 DIAGNOSIS — Z20.2 POSSIBLE EXPOSURE TO STI: ICD-10-CM

## 2024-08-08 DIAGNOSIS — R87.810 ASCUS WITH POSITIVE HIGH RISK HPV CERVICAL: Primary | ICD-10-CM

## 2024-08-08 DIAGNOSIS — R87.610 ASCUS WITH POSITIVE HIGH RISK HPV CERVICAL: Primary | ICD-10-CM

## 2024-08-08 LAB — PREGNANCY TEST URINE, POC: NEGATIVE

## 2024-08-08 PROCEDURE — 87491 CHLMYD TRACH DNA AMP PROBE: CPT

## 2024-08-08 PROCEDURE — 57454 BX/CURETT OF CERVIX W/SCOPE: CPT | Performed by: OBSTETRICS & GYNECOLOGY

## 2024-08-08 PROCEDURE — 87591 N.GONORRHOEAE DNA AMP PROB: CPT

## 2024-08-08 PROCEDURE — 81025 URINE PREGNANCY TEST: CPT | Performed by: OBSTETRICS & GYNECOLOGY

## 2024-08-08 NOTE — PROGRESS NOTES
Patient ID: Tatiana Prabhakar is a 34 y.o. female.    Colposcopy    Date/Time: 8/8/2024 3:17 PM    Performed by: Kevin Deshpande MD  Authorized by: Kevin Deshpande MD    Procedure location: cervix    Consent:     Patient questions answered: yes      Risks and benefits of the procedure and its alternatives discussed: yes      Consent obtained:  Written    Consent given by:  Patient  Indication:     Cervical indication(s): high-risk HPV positive and ASCUS    Pre-procedure:     Prep solution(s): acetic acid    Procedure:     Colposcopy with: colposcopy only, cervical biopsy and endocervical curettage      Biopsy taken: yes      # of biopsies:  2    Biopsy location(s): 3 and 11 o'clock    Cervix visibility: fully visualized      SCJ visibility: fully visualized      Lesion visualized: fully visualized      Acetowhite lesion(s): cervix      Other lesion(s): cervix      Other lesion(s) description:  Multiple nabothian cysts    Cervical impression: low grade      Ferric subsulfate solution applied: yes    Post-procedure:     Patient tolerance of procedure:  Patient tolerated the procedure well with no immediate complications

## 2024-08-09 LAB
C TRACH RRNA SPEC QL NAA+PROBE: NEGATIVE
N GONORRHOEA DNA SPEC QL PROBE+SIG AMP: NEGATIVE

## 2024-08-10 ENCOUNTER — LAB (OUTPATIENT)
Dept: LAB | Facility: LAB | Age: 34
End: 2024-08-10
Payer: COMMERCIAL

## 2024-08-10 DIAGNOSIS — Z20.2 POSSIBLE EXPOSURE TO STI: ICD-10-CM

## 2024-08-10 LAB
HBV SURFACE AG SERPL QL IA: NONREACTIVE
HCV AB SER QL: NONREACTIVE
HIV 1+2 AB+HIV1 P24 AG SERPL QL IA: NONREACTIVE

## 2024-08-10 PROCEDURE — 87340 HEPATITIS B SURFACE AG IA: CPT

## 2024-08-10 PROCEDURE — 36415 COLL VENOUS BLD VENIPUNCTURE: CPT

## 2024-08-10 PROCEDURE — 86803 HEPATITIS C AB TEST: CPT

## 2024-08-10 PROCEDURE — 86780 TREPONEMA PALLIDUM: CPT

## 2024-08-10 PROCEDURE — 87389 HIV-1 AG W/HIV-1&-2 AB AG IA: CPT

## 2024-08-11 LAB — TREPONEMA PALLIDUM IGG+IGM AB [PRESENCE] IN SERUM OR PLASMA BY IMMUNOASSAY: NONREACTIVE

## 2024-08-13 ENCOUNTER — TELEPHONE (OUTPATIENT)
Dept: OBSTETRICS AND GYNECOLOGY | Facility: CLINIC | Age: 34
End: 2024-08-13
Payer: COMMERCIAL

## 2024-08-13 NOTE — TELEPHONE ENCOUNTER
Called patient to discuss results  Identified by name and    Informed patient of results and recommended follow up  Patient inquired about discharge and itchiness she has been experiencing since before the colposcopy but had increased afterwards  States she is having drippy discharge that is clear in color but also some itching internally  Encouraged to schedule visit to come in for evaluation and self -swab   Patient verbalized understanding, all questions/concerns addressed at this time.    LALO Levy RN      ----- Message from Kevin Deshpande sent at 2024  2:12 PM EDT -----  Please let Tatiana know that her biopsies came back showing mild dysplasia.  This is consistent with her pap smear.  The plan will be to repeat a pap with HPV in one year.  ----- Message -----  From: Jacquelyn Mustafa LPN  Sent: 2024   3:49 PM EDT  To: Kevin Deshpande MD

## 2024-08-18 ENCOUNTER — HOSPITAL ENCOUNTER (EMERGENCY)
Facility: HOSPITAL | Age: 34
Discharge: HOME | End: 2024-08-18
Payer: COMMERCIAL

## 2024-08-18 VITALS
HEIGHT: 69 IN | TEMPERATURE: 99.5 F | SYSTOLIC BLOOD PRESSURE: 125 MMHG | RESPIRATION RATE: 18 BRPM | HEART RATE: 88 BPM | OXYGEN SATURATION: 99 % | BODY MASS INDEX: 32.44 KG/M2 | DIASTOLIC BLOOD PRESSURE: 80 MMHG | WEIGHT: 219 LBS

## 2024-08-18 DIAGNOSIS — K05.20 ACUTE PERICORONITIS: Primary | ICD-10-CM

## 2024-08-18 DIAGNOSIS — K04.7 DENTAL ABSCESS: ICD-10-CM

## 2024-08-18 PROCEDURE — 99283 EMERGENCY DEPT VISIT LOW MDM: CPT

## 2024-08-18 PROCEDURE — 2500000005 HC RX 250 GENERAL PHARMACY W/O HCPCS: Performed by: NURSE PRACTITIONER

## 2024-08-18 PROCEDURE — 96372 THER/PROPH/DIAG INJ SC/IM: CPT | Performed by: NURSE PRACTITIONER

## 2024-08-18 PROCEDURE — 2500000004 HC RX 250 GENERAL PHARMACY W/ HCPCS (ALT 636 FOR OP/ED): Performed by: NURSE PRACTITIONER

## 2024-08-18 RX ORDER — ONDANSETRON 4 MG/1
4 TABLET, ORALLY DISINTEGRATING ORAL ONCE
Status: COMPLETED | OUTPATIENT
Start: 2024-08-18 | End: 2024-08-18

## 2024-08-18 RX ORDER — IBUPROFEN 600 MG/1
600 TABLET ORAL EVERY 8 HOURS PRN
Qty: 21 TABLET | Refills: 0 | Status: SHIPPED | OUTPATIENT
Start: 2024-08-18 | End: 2024-08-25

## 2024-08-18 RX ORDER — KETOROLAC TROMETHAMINE 30 MG/ML
15 INJECTION, SOLUTION INTRAMUSCULAR; INTRAVENOUS ONCE
Status: COMPLETED | OUTPATIENT
Start: 2024-08-18 | End: 2024-08-18

## 2024-08-18 RX ORDER — AMOXICILLIN AND CLAVULANATE POTASSIUM 875; 125 MG/1; MG/1
1 TABLET, FILM COATED ORAL EVERY 12 HOURS
Qty: 14 TABLET | Refills: 0 | Status: SHIPPED | OUTPATIENT
Start: 2024-08-18 | End: 2024-08-25

## 2024-08-18 ASSESSMENT — COLUMBIA-SUICIDE SEVERITY RATING SCALE - C-SSRS
2. HAVE YOU ACTUALLY HAD ANY THOUGHTS OF KILLING YOURSELF?: NO
1. IN THE PAST MONTH, HAVE YOU WISHED YOU WERE DEAD OR WISHED YOU COULD GO TO SLEEP AND NOT WAKE UP?: NO
6. HAVE YOU EVER DONE ANYTHING, STARTED TO DO ANYTHING, OR PREPARED TO DO ANYTHING TO END YOUR LIFE?: NO

## 2024-08-18 ASSESSMENT — PAIN - FUNCTIONAL ASSESSMENT: PAIN_FUNCTIONAL_ASSESSMENT: 0-10

## 2024-08-18 ASSESSMENT — PAIN DESCRIPTION - LOCATION: LOCATION: TEETH

## 2024-08-18 ASSESSMENT — PAIN SCALES - GENERAL: PAINLEVEL_OUTOF10: 10 - WORST POSSIBLE PAIN

## 2024-08-18 NOTE — ED PROVIDER NOTES
HPI     CC: Dental Pain     HPI: Tatiana Prabhakar is a 34 y.o. female with past medical history of HTN, Obesity, periodontal disease presents with complaints of right tooth pain x 2 days.  She endorses associated right facial swelling and a bump in her mouth.  She rates pain 10 out of 10 that is constant.  Eating exacerbates pain.  There are no relieving factors.  She has not taken anything for her symptoms. Pain has been intermittent for past couple of years.  She denies having wisdom teeth extracted.  States pain normally comes and goes and has never been this bad.  She was also seen by a dentist and reported pain 2 years ago but was not concerned about it at that time.  She reports deep scaling 2 years ago.  She denies drainage.  She also reports not related minor head trauma yesterday from a mechanical fall where she grazed her head against the wall.  Denies dizziness, headache, loss of consciousness.  She denies use of blood thinners.    ROS: 10-point review of systems was performed and is otherwise negative except as noted in HPI.      Past Medical History: Noncontributory except per HPI     Past Surgical History: Noncontributory except per HPI     Family History: Reviewed and noncontributory     Social History:  Noncontributory except per HPI       No Known Allergies    Past Medical History:   Diagnosis Date   • Blepharochalasis left eye, unspecified eyelid 11/19/2018    Blepharochalasis of left eye   • Blepharochalasis left upper eyelid 04/07/2017    Blepharochalasis left upper eyelid   • Body mass index (BMI) 38.0-38.9, adult 12/29/2016    BMI 38.0-38.9,adult   • Chronic sinusitis, unspecified 12/18/2017    Other sinusitis   • Edema of left upper eyelid 04/07/2017    Edema of left upper eyelid   • Encounter for gynecological examination (general) (routine) without abnormal findings 05/18/2017    Encounter for annual routine gynecological examination   • Encounter for routine postpartum follow-up ([facility])  02/07/2017    Postpartum care following vaginal delivery   • Essential (primary) hypertension 02/07/2017    Benign essential hypertension   • Hypertension    • Impacted cerumen, left ear 10/13/2017    Impacted cerumen of left ear   • Localized edema 10/30/2014    Periorbital edema   • Low back pain, unspecified 04/18/2018    Dorsalgia of lumbar region   • Obstructive sleep apnea (adult) (pediatric) 10/01/2019    Obstructive sleep apnea of adult   • Obstructive sleep apnea (adult) (pediatric) 12/04/2014    Obstructive sleep apnea   • Other conditions influencing health status     Excessive mucus secretion   • Other conditions influencing health status     History of recent childbirth   • Personal history of other diseases of the circulatory system 10/28/2015    History of cardiac murmur   • Personal history of other diseases of the nervous system and sense organs 04/07/2017    History of papilledema   • Personal history of other diseases of the nervous system and sense organs 09/23/2015    History of papilledema   • Personal history of other diseases of the respiratory system 06/02/2020    History of allergic rhinitis   • Personal history of other endocrine, nutritional and metabolic disease 10/30/2015    History of hypokalemia   • Personal history of other specified conditions 08/01/2016    History of headache   • Personal history of other specified conditions 04/07/2017    History of headache   • Personal history of other specified conditions 05/18/2017    History of abdominal pain   • Polyp of cervix uteri 03/17/2016    Cervical polyp   • Pure hypercholesterolemia, unspecified 02/20/2015    Low-density-lipoid-type (LDL) hyperlipoproteinemia   • Scotoma of blind spot area, left eye 04/07/2017    Enlarged blind spot of left eye   • Sleep apnea    • Supervision of pregnancy with other poor reproductive or obstetric history, first trimester ([facility]) 12/29/2016    History of pre-eclampsia in prior pregnancy, currently  "pregnant, first trimester   • Unspecified acute lower respiratory infection 12/18/2017    Lower respiratory infection (e.g., bronchitis, pneumonia, pneumonitis, pulmonitis)   • Unspecified asthma, uncomplicated ([facility]) 06/02/2020    Asthmatic bronchitis   • Unspecified visual disturbance 04/07/2017    Transient vision disturbance       Home Meds:   Current Outpatient Medications   Medication Instructions   • amLODIPine (NORVASC) 10 mg, oral, Daily   • amoxicillin-pot clavulanate (Augmentin) 875-125 mg tablet 1 tablet, oral, Every 12 hours   • blood pressure test kit-large kit 1 device used daily for blood pressure monitoring   • docusate sodium 250 mg, oral, Daily   • ibuprofen 600 mg, oral, Every 8 hours PRN   • metoprolol succinate XL (TOPROL-XL) 50 mg, oral, Daily, Do not crush or chew.   • phentermine 37.5 mg, oral, Daily before breakfast   • triamterene-hydrochlorothiazid (Maxzide-25) 37.5-25 mg tablet 1 tablet, oral, Daily        ED Triage Vitals [08/18/24 1142]   Temperature Heart Rate Respirations BP   37.5 °C (99.5 °F) 88 18 125/80      Pulse Ox Temp Source Heart Rate Source Patient Position   99 % Temporal Monitor Sitting      BP Location FiO2 (%)     Left arm --         Heart Rate:  [88]   Temperature:  [37.5 °C (99.5 °F)]   Respirations:  [18]   BP: (125)/(80)   Height:  [175.3 cm (5' 9\")]   Weight:  [99.3 kg (219 lb)]   Pulse Ox:  [99 %]      Physical Exam:  Physical Exam  Vitals and nursing note reviewed.   Constitutional:       General: She is not in acute distress.     Appearance: She is well-developed.   HENT:      Head: Normocephalic and atraumatic. No Garcia's sign, abrasion, contusion, masses or laceration.      Jaw: Swelling present. No tenderness.      Mouth/Throat:      Mouth: Mucous membranes are moist.      Dentition: Dental tenderness, gingival swelling, dental abscesses and gum lesions present. No dental caries.     Eyes:      General: Lids are normal.      Extraocular Movements:      " Right eye: No nystagmus.      Left eye: No nystagmus.      Conjunctiva/sclera: Conjunctivae normal.      Pupils: Pupils are equal, round, and reactive to light.   Cardiovascular:      Rate and Rhythm: Normal rate and regular rhythm.      Heart sounds: No murmur heard.  Pulmonary:      Effort: Pulmonary effort is normal. No respiratory distress.      Breath sounds: Normal breath sounds.   Abdominal:      Palpations: Abdomen is soft.      Tenderness: There is no abdominal tenderness.   Musculoskeletal:         General: No swelling.      Cervical back: Neck supple.   Skin:     General: Skin is warm and dry.      Capillary Refill: Capillary refill takes less than 2 seconds.   Neurological:      Mental Status: She is alert.   Psychiatric:         Mood and Affect: Mood normal.        Diagnostic Results        Labs Reviewed - No data to display      No orders to display                 [place] Coma Scale Score: 15                  Procedure  Procedures    ED Course & MDM   Assessment/Plan:     Medications   ketorolac (Toradol) injection 15 mg (has no administration in time range)   ondansetron ODT (Zofran-ODT) disintegrating tablet 4 mg (has no administration in time range)        Diagnoses as of 08/18/24 1217   Dental abscess   Acute pericoronitis       Medical Decision Making    Tatiana Prabhakar is a 34 y.o. female with past medical history of HTN, Obesity, periodontal disease presents with complaints of right back tooth pain, facial swelling and a bump on her gums x 2 days. Pain has been intermittent for past couple of years.  She denies having wisdom teeth extracted. She reports having a deep scaling 2 years ago for periodontal disease and advised them of her pain in the same area of concern but they did not seem concerned about it at that time.  Eruption of [place] tooth vs Dental abscess. On exam VSS. There is a small abscess, and erythema with out eruption of wisdom tooth. There is notable pain with palpation of the  area, and opening wide of her mouth. Will treat with 15 mg IM Toradol for pain and Zofran ODT. Referral for Oral Surgeon placed. Patient concerned that most dentist don't take her insurance. She is advised if they will not accept her insurance to reach out to the insurance company directly and ask for a referral from them for a surgeon covered under the [place] [employer] Plan. Patient agrees with plan and denies other questions or concerns at this time. Prescription for Augementin 800-125 PO sent to pharmacy.    Non related Minor head trauma yesterday- Normocephalic, atraumatic. Patient not on blood thinners. Asymptomatic. Denies headache, dizziness, light headed, blurry vision. No wound. Pupils PERRL    Patient deemed VERY LOW RISK after screening    No imaging indicated    I completed a structured, evidence-based clinical evaluation to screen for significant intracranial injury in this patient. The evidence indicates that the patient is very low risk for intracranial injury requiring surgical intervention, and this is consistent with my clinical intuition.    The risk of further imaging or hospitalization for intracranial injury is likely higher than the risk of the patient having an intracranial injury requiring surgical intervention. It is, therefore, in the patient’s best interest not to do additional emergent testing or to be hospitalized for head injury at this time.    Follow up instructions discussed. ED precautions discussed and advised to return to ED if symptoms worsen or persist for follow up.       Disposition: Home    ED Prescriptions       Medication Sig Dispense Start Date End Date Auth. Provider    amoxicillin-pot clavulanate (Augmentin) 875-125 mg tablet Take 1 tablet by mouth every 12 hours for 7 days. 14 tablet 8/18/2024 8/25/2024 DENA Carpenter-CNP    ibuprofen 600 mg tablet Take 1 tablet (600 mg) by mouth every 8 hours if needed for mild pain (1 - 3) for up to 7 days. 21 tablet 8/18/2024  8/25/2024 PAUL Carpenter            Social Determinants Affecting Care: none     PAUL Benjamin    This note was dictated by speech recognition. Minor errors in transcription may be present.     PAUL Carpenter  08/18/24 1256       PAUL Carpenter  08/18/24 1257

## 2024-08-20 ENCOUNTER — TELEPHONE (OUTPATIENT)
Dept: PRIMARY CARE | Facility: CLINIC | Age: 34
End: 2024-08-20
Payer: COMMERCIAL

## 2024-08-20 DIAGNOSIS — Z71.3 ENCOUNTER FOR WEIGHT LOSS COUNSELING: ICD-10-CM

## 2024-08-20 RX ORDER — PHENTERMINE HYDROCHLORIDE 37.5 MG/1
37.5 CAPSULE ORAL
Qty: 30 CAPSULE | Refills: 0 | Status: SHIPPED | OUTPATIENT
Start: 2024-08-20 | End: 2024-09-19

## 2024-08-20 NOTE — TELEPHONE ENCOUNTER
I will order blood testing at a follow-up visit [facility] visit in person and determine the most appropriate needs.  I am not ordering blood work by phone at this time.

## 2024-08-20 NOTE — TELEPHONE ENCOUNTER
Patient stated that you haven't done labs for her since last year and would like to get those done.

## 2024-09-06 ENCOUNTER — APPOINTMENT (OUTPATIENT)
Dept: PRIMARY CARE | Facility: CLINIC | Age: 34
End: 2024-09-06
Payer: COMMERCIAL

## 2024-09-09 ENCOUNTER — APPOINTMENT (OUTPATIENT)
Dept: PRIMARY CARE | Facility: CLINIC | Age: 34
End: 2024-09-09
Payer: COMMERCIAL

## 2024-09-13 ENCOUNTER — OFFICE VISIT (OUTPATIENT)
Dept: PRIMARY CARE | Facility: CLINIC | Age: 34
End: 2024-09-13
Payer: COMMERCIAL

## 2024-09-13 VITALS — TEMPERATURE: 97.2 F | HEART RATE: 91 BPM | SYSTOLIC BLOOD PRESSURE: 109 MMHG | DIASTOLIC BLOOD PRESSURE: 72 MMHG

## 2024-09-13 DIAGNOSIS — Z13.1 SCREENING FOR DIABETES MELLITUS: ICD-10-CM

## 2024-09-13 DIAGNOSIS — Z13.6 SCREENING FOR CARDIOVASCULAR CONDITION: ICD-10-CM

## 2024-09-13 DIAGNOSIS — M54.16 ACUTE LEFT LUMBAR RADICULOPATHY: Primary | ICD-10-CM

## 2024-09-13 PROCEDURE — 3074F SYST BP LT 130 MM HG: CPT | Performed by: INTERNAL MEDICINE

## 2024-09-13 PROCEDURE — 99214 OFFICE O/P EST MOD 30 MIN: CPT | Performed by: INTERNAL MEDICINE

## 2024-09-13 PROCEDURE — 1036F TOBACCO NON-USER: CPT | Performed by: INTERNAL MEDICINE

## 2024-09-13 PROCEDURE — 3078F DIAST BP <80 MM HG: CPT | Performed by: INTERNAL MEDICINE

## 2024-09-13 RX ORDER — PREDNISONE 10 MG/1
TABLET ORAL
Qty: 30 TABLET | Refills: 0 | Status: SHIPPED | OUTPATIENT
Start: 2024-09-13

## 2024-09-13 ASSESSMENT — ENCOUNTER SYMPTOMS
SHORTNESS OF BREATH: 0
NUMBNESS: 1
COUGH: 0
PALPITATIONS: 0
CHILLS: 0
POLYDIPSIA: 0
FEVER: 0

## 2024-09-13 ASSESSMENT — PAIN SCALES - GENERAL: PAINLEVEL: 6

## 2024-09-13 NOTE — PROGRESS NOTES
Subjective   Patient ID: Tatiana Prabhakar is a 34 y.o. female who presents for Numbness (LEFT LEG).    4-year-old female presents today for an acute appointment related to 3 to 4-day history of abnormal sensory findings on the left dorsal surface of the foot.  It feels a bit hypersensitive to touch as well as some generalized numbness.  No significant pain.  No leg weakness to leg give out low back pain that is new or different than her prior baseline of just general body aches in that region.  No recent injuries lifting of heavy objects or new symptoms otherwise.    Patient doing exceptionally well on weight loss medication tolerating great compliant with treatment further 10 pound weight loss since early August.  No acute concerns, mild constipation but manageable through diet and lifestyle she just sometimes forgets to take a stool softener and notices a mild increase in symptoms but otherwise doing great.  We will consider titrating blood pressure medicines with further weight loss labs ordered for routine follow-up    Neuropathy           Review of Systems   Constitutional:  Negative for chills and fever.   Respiratory:  Negative for cough and shortness of breath.    Cardiovascular:  Negative for chest pain and palpitations.   Endocrine: Negative for polydipsia and polyuria.   Neurological:  Positive for numbness.       Objective   /72 (Patient Position: Sitting)   Pulse 91   Temp 36.2 °C (97.2 °F) (Temporal)   LMP 08/15/2024 (Approximate)     Physical Exam  Musculoskeletal:      Cervical back: No swelling, edema, deformity, erythema or bony tenderness.      Thoracic back: Normal.      Lumbar back: No swelling, edema, deformity, signs of trauma, spasms or tenderness. No scoliosis.   Neurological:      Deep Tendon Reflexes:      Reflex Scores:       Patellar reflexes are 2+ on the right side and 2+ on the left side.       Achilles reflexes are 2+ on the right side and 2+ on the left  side.        Assessment/Plan   Assessment & Plan  Acute left lumbar radiculopathy    Orders:    predniSONE (Deltasone) 10 mg tablet; Take 4 tablets daily for 3 days then take 3 tablets daily for 3 days then take 2 tablets daily for 3 days then take 1 tablet daily for 3 days then stop    Screening for diabetes mellitus    Orders:    Lipid Panel; Future    CBC; Future    Comprehensive Metabolic Panel; Future    Hemoglobin A1C; Future    Screening for cardiovascular condition    Orders:    Lipid Panel; Future    CBC; Future    Comprehensive Metabolic Panel; Future    Hemoglobin A1C; Future

## 2024-09-21 ENCOUNTER — LAB (OUTPATIENT)
Dept: LAB | Facility: LAB | Age: 34
End: 2024-09-21
Payer: COMMERCIAL

## 2024-09-21 DIAGNOSIS — Z13.1 SCREENING FOR DIABETES MELLITUS: ICD-10-CM

## 2024-09-21 DIAGNOSIS — Z13.6 SCREENING FOR CARDIOVASCULAR CONDITION: ICD-10-CM

## 2024-09-21 LAB
ALBUMIN SERPL BCP-MCNC: 4 G/DL (ref 3.4–5)
ALP SERPL-CCNC: 56 U/L (ref 33–110)
ALT SERPL W P-5'-P-CCNC: 13 U/L (ref 7–45)
ANION GAP SERPL CALC-SCNC: 12 MMOL/L (ref 10–20)
AST SERPL W P-5'-P-CCNC: 11 U/L (ref 9–39)
BILIRUB SERPL-MCNC: 0.7 MG/DL (ref 0–1.2)
BUN SERPL-MCNC: 11 MG/DL (ref 6–23)
CALCIUM SERPL-MCNC: 9.1 MG/DL (ref 8.6–10.3)
CHLORIDE SERPL-SCNC: 101 MMOL/L (ref 98–107)
CHOLEST SERPL-MCNC: 184 MG/DL (ref 0–199)
CHOLESTEROL/HDL RATIO: 4.4
CO2 SERPL-SCNC: 28 MMOL/L (ref 21–32)
CREAT SERPL-MCNC: 0.98 MG/DL (ref 0.5–1.05)
EGFRCR SERPLBLD CKD-EPI 2021: 77 ML/MIN/1.73M*2
ERYTHROCYTE [DISTWIDTH] IN BLOOD BY AUTOMATED COUNT: 12.8 % (ref 11.5–14.5)
EST. AVERAGE GLUCOSE BLD GHB EST-MCNC: 91 MG/DL
GLUCOSE SERPL-MCNC: 82 MG/DL (ref 74–99)
HBA1C MFR BLD: 4.8 %
HCT VFR BLD AUTO: 36.1 % (ref 36–46)
HDLC SERPL-MCNC: 42.1 MG/DL
HGB BLD-MCNC: 11.8 G/DL (ref 12–16)
LDLC SERPL CALC-MCNC: 128 MG/DL
MCH RBC QN AUTO: 28.6 PG (ref 26–34)
MCHC RBC AUTO-ENTMCNC: 32.7 G/DL (ref 32–36)
MCV RBC AUTO: 88 FL (ref 80–100)
NON HDL CHOLESTEROL: 142 MG/DL (ref 0–149)
NRBC BLD-RTO: 0 /100 WBCS (ref 0–0)
PLATELET # BLD AUTO: 332 X10*3/UL (ref 150–450)
POTASSIUM SERPL-SCNC: 3.7 MMOL/L (ref 3.5–5.3)
PROT SERPL-MCNC: 6.8 G/DL (ref 6.4–8.2)
RBC # BLD AUTO: 4.12 X10*6/UL (ref 4–5.2)
SODIUM SERPL-SCNC: 137 MMOL/L (ref 136–145)
TRIGL SERPL-MCNC: 69 MG/DL (ref 0–149)
VLDL: 14 MG/DL (ref 0–40)
WBC # BLD AUTO: 6 X10*3/UL (ref 4.4–11.3)

## 2024-09-21 PROCEDURE — 36415 COLL VENOUS BLD VENIPUNCTURE: CPT

## 2024-09-21 PROCEDURE — 80061 LIPID PANEL: CPT

## 2024-09-21 PROCEDURE — 80053 COMPREHEN METABOLIC PANEL: CPT

## 2024-09-21 PROCEDURE — 83036 HEMOGLOBIN GLYCOSYLATED A1C: CPT

## 2024-09-21 PROCEDURE — 85027 COMPLETE CBC AUTOMATED: CPT

## 2024-09-23 ENCOUNTER — TELEPHONE (OUTPATIENT)
Dept: OBSTETRICS AND GYNECOLOGY | Facility: CLINIC | Age: 34
End: 2024-09-23
Payer: COMMERCIAL

## 2024-09-23 NOTE — TELEPHONE ENCOUNTER
Tatiana Prabhakar is calling in because she having recurring discharge and want to know if something can be sent to her pharmacy or do she need to come in for an appointment? Patient would like a call back on [phone].    Hermelinda Juan MA

## 2024-09-25 ENCOUNTER — APPOINTMENT (OUTPATIENT)
Dept: PRIMARY CARE | Facility: CLINIC | Age: 34
End: 2024-09-25
Payer: COMMERCIAL

## 2024-09-30 DIAGNOSIS — M54.16 ACUTE LEFT LUMBAR RADICULOPATHY: ICD-10-CM

## 2024-09-30 DIAGNOSIS — I10 PRIMARY HYPERTENSION: ICD-10-CM

## 2024-09-30 DIAGNOSIS — Z71.3 ENCOUNTER FOR WEIGHT LOSS COUNSELING: ICD-10-CM

## 2024-09-30 RX ORDER — AMLODIPINE BESYLATE 10 MG/1
10 TABLET ORAL DAILY
Qty: 90 TABLET | Refills: 3 | Status: SHIPPED | OUTPATIENT
Start: 2024-09-30

## 2024-09-30 RX ORDER — PHENTERMINE HYDROCHLORIDE 37.5 MG/1
37.5 CAPSULE ORAL
Qty: 30 CAPSULE | Refills: 0 | Status: SHIPPED | OUTPATIENT
Start: 2024-09-30 | End: 2024-10-30

## 2024-10-01 ENCOUNTER — APPOINTMENT (OUTPATIENT)
Dept: OBSTETRICS AND GYNECOLOGY | Facility: CLINIC | Age: 34
End: 2024-10-01
Payer: COMMERCIAL

## 2024-10-01 VITALS — BODY MASS INDEX: 32.34 KG/M2 | DIASTOLIC BLOOD PRESSURE: 74 MMHG | WEIGHT: 219 LBS | SYSTOLIC BLOOD PRESSURE: 122 MMHG

## 2024-10-01 DIAGNOSIS — N76.0 BV (BACTERIAL VAGINOSIS): ICD-10-CM

## 2024-10-01 DIAGNOSIS — B96.89 BV (BACTERIAL VAGINOSIS): ICD-10-CM

## 2024-10-01 PROCEDURE — 3078F DIAST BP <80 MM HG: CPT | Performed by: ADVANCED PRACTICE MIDWIFE

## 2024-10-01 PROCEDURE — 3074F SYST BP LT 130 MM HG: CPT | Performed by: ADVANCED PRACTICE MIDWIFE

## 2024-10-01 PROCEDURE — 99213 OFFICE O/P EST LOW 20 MIN: CPT | Performed by: ADVANCED PRACTICE MIDWIFE

## 2024-10-01 PROCEDURE — 87205 SMEAR GRAM STAIN: CPT

## 2024-10-01 ASSESSMENT — ENCOUNTER SYMPTOMS
ALLERGIC/IMMUNOLOGIC NEGATIVE: 0
GASTROINTESTINAL NEGATIVE: 1
NEUROLOGICAL NEGATIVE: 1
HEMATOLOGIC/LYMPHATIC NEGATIVE: 0
FREQUENCY: 0
ALLERGIC/IMMUNOLOGIC NEGATIVE: 1
CARDIOVASCULAR NEGATIVE: 0
HEMATOLOGIC/LYMPHATIC NEGATIVE: 1
ENDOCRINE NEGATIVE: 1
CARDIOVASCULAR NEGATIVE: 1
MUSCULOSKELETAL NEGATIVE: 1
ENDOCRINE NEGATIVE: 0
PSYCHIATRIC NEGATIVE: 0
EYES NEGATIVE: 1
DYSURIA: 0
MUSCULOSKELETAL NEGATIVE: 0
PSYCHIATRIC NEGATIVE: 1
CONSTITUTIONAL NEGATIVE: 1
GASTROINTESTINAL NEGATIVE: 0
RESPIRATORY NEGATIVE: 1
NEUROLOGICAL NEGATIVE: 0
EYES NEGATIVE: 0
CONSTITUTIONAL NEGATIVE: 0
RESPIRATORY NEGATIVE: 0

## 2024-10-01 NOTE — PROGRESS NOTES
Subjective   Patient ID: Tatiana Prabhakar is a 34 y.o. female who presents for Vaginitis/Bacterial Vaginosis (FU for BV symptoms /Fly ZAMORA /).    HPI  Tatiana is 34 y.o, female patient with history of HTN, Obesity, sleep apnea. Today  complaining of vaginal itching and discharges with clear color, no bad odour, for a week. She thinks she might have BV. She reports using a regular soup, no change in her cleaning habits.    Menstrual History:  LMP: 2024  Regular period, no dysmenorrhea  Last for 7 days.  No spotting or bleeding in between periods.    Sexual History  Not sexually active.    STDs:   Vaginitis in the past, treated. Tested for STD last month with normal results.  Patient denied testing today.    Contraception:  Tubal sterilization    Last PAP:   1) 2024 - HPV 16-18 type with (ASC-US).  2) 2024- colposcopy done: with (CIN1).  Repeat in 1yr  Family history of Breast cancer: No   Family history of Ovarian or Cervical cancer: No   OB History          4    Para   4    Term   4       0    AB   0    Living   4         SAB   0    IAB   0    Ectopic   0    Multiple        Live Births   4                PMH: HTN, Obesity, sleep apnea.  Medications use: blood pressure meds.      Review of Systems   Constitutional: Negative.    HENT: Negative.     Eyes: Negative.    Respiratory: Negative.     Cardiovascular: Negative.    Gastrointestinal: Negative.    Endocrine: Negative.    Genitourinary:  Positive for vaginal discharge. Negative for dysuria, enuresis, frequency, genital sores, menstrual problem, pelvic pain, urgency and vaginal bleeding.   Musculoskeletal: Negative.    Allergic/Immunologic: Negative.    Neurological: Negative.    Hematological: Negative.    Psychiatric/Behavioral: Negative.         Objective   Vitals:    10/01/24 1031   BP: 122/74        Physical Exam  Constitutional:       Appearance: Normal appearance.   Cardiovascular:      Rate and Rhythm: Normal rate.    Pulmonary:      Effort: Pulmonary effort is normal.   Genitourinary:     General: Normal vulva.   Neurological:      Mental Status: She is alert.     Vaginal swap taken without speculum exam.   Nitrazine test is suggestive of positive BV.      Assessment/Plan   Problem List Items Addressed This Visit    None  Visit Diagnoses         Codes    BV (bacterial vaginosis)     N76.0, B96.89    Relevant Medications    metroNIDAZOLE (Flagyl) 500 mg tablet    Other Relevant Orders    Vaginitis Gram Stain For Bacterial Vaginosis + Yeast          Education regarding food and vaginal hygiene to increase lactobacillus and BV prevention in the  with CNM.    Plan  To follow up if problem persist.  Annual exam and repeat Pap test next year.       DENA Harrington-VAZQUEZ CNM Student 10/02/24 2:06 PM

## 2024-10-02 RX ORDER — METRONIDAZOLE 500 MG/1
500 TABLET ORAL 2 TIMES DAILY
Qty: 14 TABLET | Refills: 0 | Status: SHIPPED | OUTPATIENT
Start: 2024-10-02 | End: 2024-10-09

## 2024-10-04 ENCOUNTER — TELEPHONE (OUTPATIENT)
Dept: OBSTETRICS AND GYNECOLOGY | Facility: CLINIC | Age: 34
End: 2024-10-04
Payer: COMMERCIAL

## 2024-10-04 LAB
CLUE CELLS VAG LPF-#/AREA: PRESENT /[LPF]
NUGENT SCORE: 7
YEAST VAG WET PREP-#/AREA: ABNORMAL

## 2024-10-04 NOTE — TELEPHONE ENCOUNTER
Called patient to discuss results  Identified by name and   Informed patient of results and medication at pharmacy.  Patient verbalized understanding, no questions/concerns at this time.    ALYSHA LevyN RN      ----- Message from Felicitas Reno sent at 10/4/2024  3:58 PM EDT -----  Regarding: bv  This patient has BV. I have placed an order for Flagyl to her pharmacy. Can you let her know? Thanks! Let me know if there are any questions or concerns.  ----- Message -----  From: Lab, Background User  Sent: 10/4/2024  11:02 AM EDT  To: JONELLE Harrington

## 2024-12-02 DIAGNOSIS — Z71.3 ENCOUNTER FOR WEIGHT LOSS COUNSELING: ICD-10-CM

## 2024-12-02 RX ORDER — PHENTERMINE HYDROCHLORIDE 37.5 MG/1
37.5 CAPSULE ORAL
Qty: 30 CAPSULE | Refills: 0 | OUTPATIENT
Start: 2024-12-02 | End: 2025-01-01

## 2024-12-02 NOTE — TELEPHONE ENCOUNTER
Patient has no appointment for weight loss monitoring scheduled and is requesting refills or needs to be an appointment on the books before additional refills will be sent

## 2024-12-09 DIAGNOSIS — Z71.3 ENCOUNTER FOR WEIGHT LOSS COUNSELING: ICD-10-CM

## 2024-12-09 RX ORDER — PHENTERMINE HYDROCHLORIDE 37.5 MG/1
37.5 CAPSULE ORAL
Qty: 21 CAPSULE | Refills: 0 | Status: SHIPPED | OUTPATIENT
Start: 2024-12-09 | End: 2024-12-30

## 2025-01-02 ENCOUNTER — APPOINTMENT (OUTPATIENT)
Dept: PRIMARY CARE | Facility: CLINIC | Age: 35
End: 2025-01-02
Payer: COMMERCIAL

## 2025-01-09 ENCOUNTER — APPOINTMENT (OUTPATIENT)
Dept: PRIMARY CARE | Facility: CLINIC | Age: 35
End: 2025-01-09
Payer: COMMERCIAL

## 2025-01-09 VITALS
HEART RATE: 82 BPM | SYSTOLIC BLOOD PRESSURE: 121 MMHG | WEIGHT: 214 LBS | DIASTOLIC BLOOD PRESSURE: 79 MMHG | BODY MASS INDEX: 31.6 KG/M2 | TEMPERATURE: 98.3 F

## 2025-01-09 DIAGNOSIS — Z71.3 ENCOUNTER FOR WEIGHT LOSS COUNSELING: ICD-10-CM

## 2025-01-09 DIAGNOSIS — E66.812 CLASS 2 SEVERE OBESITY DUE TO EXCESS CALORIES WITH SERIOUS COMORBIDITY AND BODY MASS INDEX (BMI) OF 36.0 TO 36.9 IN ADULT: Primary | ICD-10-CM

## 2025-01-09 DIAGNOSIS — E66.01 CLASS 2 SEVERE OBESITY DUE TO EXCESS CALORIES WITH SERIOUS COMORBIDITY AND BODY MASS INDEX (BMI) OF 36.0 TO 36.9 IN ADULT: Primary | ICD-10-CM

## 2025-01-09 DIAGNOSIS — L20.84 INTRINSIC ECZEMA: ICD-10-CM

## 2025-01-09 DIAGNOSIS — I10 PRIMARY HYPERTENSION: ICD-10-CM

## 2025-01-09 PROCEDURE — 3078F DIAST BP <80 MM HG: CPT | Performed by: INTERNAL MEDICINE

## 2025-01-09 PROCEDURE — 99214 OFFICE O/P EST MOD 30 MIN: CPT | Performed by: INTERNAL MEDICINE

## 2025-01-09 PROCEDURE — 1036F TOBACCO NON-USER: CPT | Performed by: INTERNAL MEDICINE

## 2025-01-09 PROCEDURE — 3074F SYST BP LT 130 MM HG: CPT | Performed by: INTERNAL MEDICINE

## 2025-01-09 RX ORDER — AMLODIPINE BESYLATE 5 MG/1
5 TABLET ORAL DAILY
Qty: 90 TABLET | Refills: 3 | Status: SHIPPED | OUTPATIENT
Start: 2025-01-09

## 2025-01-09 RX ORDER — PHENTERMINE HYDROCHLORIDE 37.5 MG/1
37.5 CAPSULE ORAL
Qty: 30 CAPSULE | Refills: 0 | Status: SHIPPED | OUTPATIENT
Start: 2025-01-09 | End: 2025-02-08

## 2025-01-09 RX ORDER — PHENTERMINE HYDROCHLORIDE 37.5 MG/1
37.5 CAPSULE ORAL
Qty: 30 CAPSULE | Refills: 0 | Status: SHIPPED | OUTPATIENT
Start: 2025-02-04 | End: 2025-03-06

## 2025-01-09 ASSESSMENT — ENCOUNTER SYMPTOMS
PALPITATIONS: 0
FEVER: 0
SHORTNESS OF BREATH: 0
CHILLS: 0
COUGH: 0
POLYDIPSIA: 0

## 2025-01-09 ASSESSMENT — PAIN SCALES - GENERAL: PAINLEVEL_OUTOF10: 0-NO PAIN

## 2025-01-09 NOTE — PROGRESS NOTES
Subjective   Patient ID: Tatiana Prabhakar is a 34 y.o. female who presents for No chief complaint on file..    Pt presents for weight loss medication follow up.   Pt continues to have success with treatment plan, continued weight loss. BMI 36 to 31 in course of treatment since onset. Remains committed to regular exercise. Diet changes and is doing very well. No adverse reactions to treatment to date.     Continue therapy   Reassess in 2 months  Reduce BP meds mildly now and reassess  Consider MICHELLE re-eval if BMI under 30       Review of Systems   Constitutional:  Negative for chills and fever.   Respiratory:  Negative for cough and shortness of breath.    Cardiovascular:  Negative for chest pain and palpitations.   Endocrine: Negative for polydipsia and polyuria.       Objective   /79 (BP Location: Left arm, Patient Position: Sitting, BP Cuff Size: Adult)   Pulse 82   Temp 36.8 °C (98.3 °F) (Temporal)   Wt 97.1 kg (214 lb)   BMI 31.60 kg/m²     Physical Exam  Constitutional:       Appearance: Normal appearance.   HENT:      Head: Normocephalic and atraumatic.   Eyes:      Extraocular Movements: Extraocular movements intact.      Pupils: Pupils are equal, round, and reactive to light.   Neck:      Thyroid: No thyroid mass or thyromegaly.   Musculoskeletal:      Cervical back: Neck supple.      Right lower leg: No edema.      Left lower leg: No edema.   Neurological:      Mental Status: She is alert.       Assessment/Plan   Assessment & Plan  Class 2 severe obesity due to excess calories with serious comorbidity and body mass index (BMI) of 36.0 to 36.9 in adult         Encounter for weight loss counseling    Orders:  •  phentermine 37.5 mg capsule; Take 1 capsule (37.5 mg) by mouth once daily in the morning. Take before meals. BMI 31.6  •  phentermine 37.5 mg capsule; Take 1 capsule (37.5 mg) by mouth once daily in the morning. Take before meals. BMI 31.6 Do not fill before February 4, 2025.    BMI  31.0-31.9,adult    Orders:  •  phentermine 37.5 mg capsule; Take 1 capsule (37.5 mg) by mouth once daily in the morning. Take before meals. BMI 31.6  •  phentermine 37.5 mg capsule; Take 1 capsule (37.5 mg) by mouth once daily in the morning. Take before meals. BMI 31.6 Do not fill before February 4, 2025.    BMI 36.0-36.9,adult         Intrinsic eczema         Primary hypertension    Orders:  •  amLODIPine (Norvasc) 5 mg tablet; Take 1 tablet (5 mg) by mouth once daily.

## 2025-02-20 DIAGNOSIS — Z71.3 ENCOUNTER FOR WEIGHT LOSS COUNSELING: ICD-10-CM

## 2025-02-20 RX ORDER — PHENTERMINE HYDROCHLORIDE 37.5 MG/1
37.5 CAPSULE ORAL
Qty: 30 CAPSULE | Refills: 0 | Status: SHIPPED | OUTPATIENT
Start: 2025-02-20 | End: 2025-03-22

## 2025-04-03 DIAGNOSIS — Z71.3 ENCOUNTER FOR WEIGHT LOSS COUNSELING: ICD-10-CM

## 2025-04-03 RX ORDER — PHENTERMINE HYDROCHLORIDE 37.5 MG/1
37.5 CAPSULE ORAL
Qty: 30 CAPSULE | Refills: 0 | OUTPATIENT
Start: 2025-04-03 | End: 2025-05-03

## 2025-04-24 ENCOUNTER — APPOINTMENT (OUTPATIENT)
Dept: PRIMARY CARE | Facility: CLINIC | Age: 35
End: 2025-04-24
Payer: COMMERCIAL

## 2025-04-24 VITALS
HEART RATE: 80 BPM | TEMPERATURE: 96.9 F | DIASTOLIC BLOOD PRESSURE: 77 MMHG | SYSTOLIC BLOOD PRESSURE: 113 MMHG | WEIGHT: 210.4 LBS | BODY MASS INDEX: 31.07 KG/M2

## 2025-04-24 DIAGNOSIS — G47.33 OSA (OBSTRUCTIVE SLEEP APNEA): ICD-10-CM

## 2025-04-24 DIAGNOSIS — Z71.3 ENCOUNTER FOR WEIGHT LOSS COUNSELING: ICD-10-CM

## 2025-04-24 DIAGNOSIS — I95.1 ORTHOSTASIS: Primary | ICD-10-CM

## 2025-04-24 DIAGNOSIS — M54.9 MID BACK PAIN, CHRONIC: ICD-10-CM

## 2025-04-24 DIAGNOSIS — I10 PRIMARY HYPERTENSION: ICD-10-CM

## 2025-04-24 DIAGNOSIS — G89.29 CHRONIC MIDLINE LOW BACK PAIN WITHOUT SCIATICA: ICD-10-CM

## 2025-04-24 DIAGNOSIS — G89.29 MID BACK PAIN, CHRONIC: ICD-10-CM

## 2025-04-24 DIAGNOSIS — M54.50 CHRONIC MIDLINE LOW BACK PAIN WITHOUT SCIATICA: ICD-10-CM

## 2025-04-24 PROCEDURE — 3074F SYST BP LT 130 MM HG: CPT | Performed by: INTERNAL MEDICINE

## 2025-04-24 PROCEDURE — 99214 OFFICE O/P EST MOD 30 MIN: CPT | Performed by: INTERNAL MEDICINE

## 2025-04-24 PROCEDURE — 1036F TOBACCO NON-USER: CPT | Performed by: INTERNAL MEDICINE

## 2025-04-24 PROCEDURE — 3078F DIAST BP <80 MM HG: CPT | Performed by: INTERNAL MEDICINE

## 2025-04-24 RX ORDER — PHENTERMINE HYDROCHLORIDE 37.5 MG/1
37.5 CAPSULE ORAL
Qty: 30 CAPSULE | Refills: 0 | Status: SHIPPED | OUTPATIENT
Start: 2025-04-24 | End: 2025-05-24

## 2025-04-24 RX ORDER — PHENTERMINE HYDROCHLORIDE 37.5 MG/1
37.5 CAPSULE ORAL
Qty: 30 CAPSULE | Refills: 0 | Status: SHIPPED | OUTPATIENT
Start: 2025-06-20 | End: 2025-07-20

## 2025-04-24 RX ORDER — PHENTERMINE HYDROCHLORIDE 37.5 MG/1
37.5 CAPSULE ORAL
Qty: 30 CAPSULE | Refills: 0 | Status: SHIPPED | OUTPATIENT
Start: 2025-05-23 | End: 2025-06-22

## 2025-04-24 ASSESSMENT — ENCOUNTER SYMPTOMS
CHILLS: 0
SHORTNESS OF BREATH: 0
PALPITATIONS: 0
POLYDIPSIA: 0
COUGH: 0
FEVER: 0

## 2025-04-24 NOTE — PROGRESS NOTES
Subjective   Patient ID: Tatiana Prabhakar is a 34 y.o. female who presents for Sleep Study.    OARRS:  No data recorded  I have personally reviewed the OARRS report for Tatiana Prabhakar. I have considered the risks of abuse, dependence, addiction and diversion and I believe that it is clinically appropriate for Tatiana Prabhakar to be prescribed this medication    Is the patient prescribed a combination of a benzodiazepine and opioid?  No    Last Urine Drug Screen / ordered today: No  No results found for this or any previous visit (from the past 8760 hours).    Controlled Substance Agreement:  Date of the Last Agreement: 1/8/2025  Reviewed Controlled Substance Agreement including but not limited to the benefits, risks, and alternatives to treatment with a Controlled Substance medication(s).    Anorexiants:   What is the patient's goal of therapy? Weight management and long term weight loss  Is this being achieved with current treatment? Yes, consistently and safely    I have assessed the patient's continuing efforts to lose weight., I have assessed the patient's dedication to the treatment program and the response to treatment., and I have assessed the presence or absence of contraindications, adverse effects, and indicators of possible substance abuse that would necessitate cessation of treatment utilizing controlled substance.    Patient has demonstrated continued efforts to lose weight, is dedicated to the treatment program and the response to treatment. and I have assessed for the presence or absence of contraindications, adverse effects, and indicators of possible substance abuse that would necessitate cessation of treatment utilizing controlled substance.    Activities of Daily Living:   Is your overall impression that this patient is benefiting (symptom reduction outweighs side effects) from anorexiants therapy? Yes     1. Physical Functioning: Better  2. Family Relationship: Same  3. Social Relationship: Same  4.  Mood: Better  5. Sleep Patterns: Better  6. Overall Function: Better    34-year-old female presents today for routine follow-up on multiple health issues.  She continues to do very well on her phentermine treatment plan although she has been off for the last few weeks due to not following up in office as directed on a controlled substance plan.  She has not been using the medication against medical direction she has been having great success with using it with steady consistent weight loss and no perceived side effects.  With the weight loss her blood pressure has been improving nicely, her blood pressure today reflects being off amlodipine for 3 consecutive days.  Prior to discontinuing it due to running out of her current supply she was having somewhat regular bouts of orthostatic hypotension, suspect, as a result of positional changes resulting in dizzy spells.  She does not recall having them off amlodipine in the last couple days and her blood pressure today is excellent.  I believe the weight loss has de-escalated her need for her blood pressure medicines and I have discontinued the amlodipine at this time.  We discussed the resuming of the phentermine currently she is still on her controlled substance contract and a new 1 does not need to be signed at this time.  She will adhere to the follow-up recommendations of 4 months resume the medication 3-month supply with set and she was advised again to continue her excellent dietary and lifestyle practices including regular exercise to lose and maintain further weight loss for the future.    She has a history of obstructive sleep apnea first diagnosed in 2018 2019.  Since that time she has lost significant weight she continues to have resistant hypertension although her medications are de-escalating.  With her significant weight loss the possibility of improved sleep apnea or the resolution of the condition is possible.  She is currently not treating it due to  issues with the prior machine not being comfortable for her.  I have advised her that reassessing her sleep apnea and considering treatment is critical, because if she still has sleep apnea and is not treating it long-term complications resistant hypertension and hypersomnia are long-term issues for her that could result in the reversible chronic health issues.  She is agreeable to undergoing any sleep study to reevaluate and is optimistic that her weight loss has resulted in no longer dealing with that issue. Epiworth 9, BMI 31                 Review of Systems   Constitutional:  Negative for chills and fever.   Respiratory:  Negative for cough and shortness of breath.    Cardiovascular:  Negative for chest pain and palpitations.   Endocrine: Negative for polydipsia and polyuria.       Objective   /77   Pulse 80   Temp 36.1 °C (96.9 °F) (Temporal)   Wt 95.4 kg (210 lb 6.4 oz)   BMI 31.07 kg/m²     Physical Exam  Constitutional:       Appearance: Normal appearance.   HENT:      Head: Normocephalic and atraumatic.   Eyes:      Extraocular Movements: Extraocular movements intact.      Pupils: Pupils are equal, round, and reactive to light.   Neck:      Thyroid: No thyroid mass or thyromegaly.   Cardiovascular:      Rate and Rhythm: Normal rate and regular rhythm.      Heart sounds: No murmur heard.     No friction rub. No gallop.   Pulmonary:      Effort: No respiratory distress.      Breath sounds: No wheezing, rhonchi or rales.   Musculoskeletal:      Cervical back: Neck supple.      Right lower leg: No edema.      Left lower leg: No edema.   Neurological:      Mental Status: She is alert.         Assessment/Plan   Assessment & Plan  Primary hypertension    Orders:    Home sleep apnea test (HSAT); Future    Orthostasis         MICHELLE (obstructive sleep apnea)    Orders:    Home sleep apnea test (HSAT); Future    BMI 31.0-31.9,adult    Orders:    Home sleep apnea test (HSAT); Future    Encounter for weight  loss counseling    Orders:    phentermine 37.5 mg capsule; Take 1 capsule (37.5 mg) by mouth once daily in the morning. Take before meals.    phentermine 37.5 mg capsule; Take 1 capsule (37.5 mg) by mouth once daily in the morning. Take before meals. Do not fill before May 23, 2025.    phentermine 37.5 mg capsule; Take 1 capsule (37.5 mg) by mouth once daily in the morning. Take before meals. Do not fill before June 20, 2025.    Mid back pain, chronic    Orders:    Referral to Physical Therapy; Future    Chronic midline low back pain without sciatica    Orders:    Referral to Physical Therapy; Future

## 2025-04-28 NOTE — TELEPHONE ENCOUNTER
Weight loss orders require a referral for a Dietitian or nutritionist.   
0 = swallows foods/liquids without difficulty

## 2025-05-01 ENCOUNTER — HOSPITAL ENCOUNTER (EMERGENCY)
Facility: HOSPITAL | Age: 35
Discharge: ANOTHER HOSPITAL | End: 2025-05-01
Attending: GENERAL PRACTICE
Payer: COMMERCIAL

## 2025-05-01 ENCOUNTER — APPOINTMENT (OUTPATIENT)
Dept: CARDIOLOGY | Facility: HOSPITAL | Age: 35
End: 2025-05-01
Payer: COMMERCIAL

## 2025-05-01 ENCOUNTER — APPOINTMENT (OUTPATIENT)
Dept: RADIOLOGY | Facility: HOSPITAL | Age: 35
End: 2025-05-01
Payer: COMMERCIAL

## 2025-05-01 ENCOUNTER — HOSPITAL ENCOUNTER (INPATIENT)
Facility: HOSPITAL | Age: 35
End: 2025-05-01
Attending: INTERNAL MEDICINE | Admitting: INTERNAL MEDICINE
Payer: COMMERCIAL

## 2025-05-01 VITALS
BODY MASS INDEX: 29.62 KG/M2 | DIASTOLIC BLOOD PRESSURE: 86 MMHG | HEIGHT: 69 IN | WEIGHT: 200 LBS | HEART RATE: 77 BPM | TEMPERATURE: 98 F | SYSTOLIC BLOOD PRESSURE: 124 MMHG | OXYGEN SATURATION: 100 % | RESPIRATION RATE: 14 BRPM

## 2025-05-01 DIAGNOSIS — R91.8 HILAR MASS: Primary | ICD-10-CM

## 2025-05-01 DIAGNOSIS — J98.51 FIBROSING MEDIASTINITIS: ICD-10-CM

## 2025-05-01 DIAGNOSIS — I26.99 PULMONARY ARTERY THROMBOSIS (MULTI): ICD-10-CM

## 2025-05-01 DIAGNOSIS — I26.99 PULMONARY EMBOLUS, RIGHT (MULTI): ICD-10-CM

## 2025-05-01 DIAGNOSIS — I87.1 SVC (SUPERIOR VENA CAVA OBSTRUCTION): Primary | ICD-10-CM

## 2025-05-01 LAB
ALBUMIN SERPL BCP-MCNC: 4.1 G/DL (ref 3.4–5)
ALP SERPL-CCNC: 48 U/L (ref 33–110)
ALT SERPL W P-5'-P-CCNC: 12 U/L (ref 7–45)
ANION GAP SERPL CALC-SCNC: 15 MMOL/L (ref 10–20)
APTT PPP: 32 SECONDS (ref 26–36)
AST SERPL W P-5'-P-CCNC: 16 U/L (ref 9–39)
B-HCG SERPL-ACNC: <2 MIU/ML
BASOPHILS # BLD AUTO: 0.03 X10*3/UL (ref 0–0.1)
BASOPHILS NFR BLD AUTO: 0.5 %
BILIRUB SERPL-MCNC: 0.4 MG/DL (ref 0–1.2)
BUN SERPL-MCNC: 16 MG/DL (ref 6–23)
CALCIUM SERPL-MCNC: 9.5 MG/DL (ref 8.6–10.3)
CARDIAC TROPONIN I PNL SERPL HS: <3 NG/L (ref 0–13)
CHLORIDE SERPL-SCNC: 103 MMOL/L (ref 98–107)
CO2 SERPL-SCNC: 24 MMOL/L (ref 21–32)
CREAT SERPL-MCNC: 0.97 MG/DL (ref 0.5–1.05)
EGFRCR SERPLBLD CKD-EPI 2021: 78 ML/MIN/1.73M*2
EOSINOPHIL # BLD AUTO: 0.16 X10*3/UL (ref 0–0.7)
EOSINOPHIL NFR BLD AUTO: 2.5 %
ERYTHROCYTE [DISTWIDTH] IN BLOOD BY AUTOMATED COUNT: 12.9 % (ref 11.5–14.5)
FLUAV RNA RESP QL NAA+PROBE: NOT DETECTED
FLUBV RNA RESP QL NAA+PROBE: NOT DETECTED
GLUCOSE SERPL-MCNC: 111 MG/DL (ref 74–99)
HCT VFR BLD AUTO: 31.3 % (ref 36–46)
HGB BLD-MCNC: 10.4 G/DL (ref 12–16)
IMM GRANULOCYTES # BLD AUTO: 0.03 X10*3/UL (ref 0–0.7)
IMM GRANULOCYTES NFR BLD AUTO: 0.5 % (ref 0–0.9)
LYMPHOCYTES # BLD AUTO: 1.88 X10*3/UL (ref 1.2–4.8)
LYMPHOCYTES NFR BLD AUTO: 29.2 %
MCH RBC QN AUTO: 28.6 PG (ref 26–34)
MCHC RBC AUTO-ENTMCNC: 33.2 G/DL (ref 32–36)
MCV RBC AUTO: 86 FL (ref 80–100)
MONOCYTES # BLD AUTO: 0.23 X10*3/UL (ref 0.1–1)
MONOCYTES NFR BLD AUTO: 3.6 %
NEUTROPHILS # BLD AUTO: 4.11 X10*3/UL (ref 1.2–7.7)
NEUTROPHILS NFR BLD AUTO: 63.7 %
NRBC BLD-RTO: 0 /100 WBCS (ref 0–0)
PLATELET # BLD AUTO: 317 X10*3/UL (ref 150–450)
POTASSIUM SERPL-SCNC: 3.6 MMOL/L (ref 3.5–5.3)
PROT SERPL-MCNC: 7.1 G/DL (ref 6.4–8.2)
RBC # BLD AUTO: 3.64 X10*6/UL (ref 4–5.2)
RSV RNA RESP QL NAA+PROBE: NOT DETECTED
SARS-COV-2 RNA RESP QL NAA+PROBE: NOT DETECTED
SODIUM SERPL-SCNC: 138 MMOL/L (ref 136–145)
UFH PPP CHRO-ACNC: 0.4 IU/ML (ref ?–1.1)
UFH PPP CHRO-ACNC: 0.5 IU/ML (ref ?–1.1)
UFH PPP CHRO-ACNC: 0.8 IU/ML (ref ?–1.1)
WBC # BLD AUTO: 6.4 X10*3/UL (ref 4.4–11.3)

## 2025-05-01 PROCEDURE — 71275 CT ANGIOGRAPHY CHEST: CPT

## 2025-05-01 PROCEDURE — 85520 HEPARIN ASSAY: CPT | Performed by: EMERGENCY MEDICINE

## 2025-05-01 PROCEDURE — 36415 COLL VENOUS BLD VENIPUNCTURE: CPT | Performed by: GENERAL PRACTICE

## 2025-05-01 PROCEDURE — 93005 ELECTROCARDIOGRAM TRACING: CPT

## 2025-05-01 PROCEDURE — 2500000001 HC RX 250 WO HCPCS SELF ADMINISTERED DRUGS (ALT 637 FOR MEDICARE OP): Performed by: EMERGENCY MEDICINE

## 2025-05-01 PROCEDURE — 2500000001 HC RX 250 WO HCPCS SELF ADMINISTERED DRUGS (ALT 637 FOR MEDICARE OP): Mod: SE

## 2025-05-01 PROCEDURE — 87637 SARSCOV2&INF A&B&RSV AMP PRB: CPT | Performed by: GENERAL PRACTICE

## 2025-05-01 PROCEDURE — 85730 THROMBOPLASTIN TIME PARTIAL: CPT | Performed by: GENERAL PRACTICE

## 2025-05-01 PROCEDURE — 84484 ASSAY OF TROPONIN QUANT: CPT | Performed by: GENERAL PRACTICE

## 2025-05-01 PROCEDURE — 99285 EMERGENCY DEPT VISIT HI MDM: CPT | Mod: 25 | Performed by: GENERAL PRACTICE

## 2025-05-01 PROCEDURE — 2500000002 HC RX 250 W HCPCS SELF ADMINISTERED DRUGS (ALT 637 FOR MEDICARE OP, ALT 636 FOR OP/ED): Performed by: GENERAL PRACTICE

## 2025-05-01 PROCEDURE — 85025 COMPLETE CBC W/AUTO DIFF WBC: CPT | Performed by: GENERAL PRACTICE

## 2025-05-01 PROCEDURE — 71045 X-RAY EXAM CHEST 1 VIEW: CPT | Mod: FOREIGN READ | Performed by: RADIOLOGY

## 2025-05-01 PROCEDURE — 84702 CHORIONIC GONADOTROPIN TEST: CPT | Performed by: GENERAL PRACTICE

## 2025-05-01 PROCEDURE — 85520 HEPARIN ASSAY: CPT

## 2025-05-01 PROCEDURE — 99223 1ST HOSP IP/OBS HIGH 75: CPT

## 2025-05-01 PROCEDURE — 2550000001 HC RX 255 CONTRASTS: Performed by: GENERAL PRACTICE

## 2025-05-01 PROCEDURE — 2500000004 HC RX 250 GENERAL PHARMACY W/ HCPCS (ALT 636 FOR OP/ED): Mod: JZ | Performed by: GENERAL PRACTICE

## 2025-05-01 PROCEDURE — 94640 AIRWAY INHALATION TREATMENT: CPT

## 2025-05-01 PROCEDURE — 80053 COMPREHEN METABOLIC PANEL: CPT | Performed by: GENERAL PRACTICE

## 2025-05-01 PROCEDURE — 2500000004 HC RX 250 GENERAL PHARMACY W/ HCPCS (ALT 636 FOR OP/ED): Mod: JZ,SE

## 2025-05-01 PROCEDURE — 1200000003 HC ONCOLOGY  ROOM WITH TELEMETRY DAILY

## 2025-05-01 PROCEDURE — 71045 X-RAY EXAM CHEST 1 VIEW: CPT

## 2025-05-01 PROCEDURE — 71275 CT ANGIOGRAPHY CHEST: CPT | Mod: FOREIGN READ | Performed by: RADIOLOGY

## 2025-05-01 RX ORDER — POLYETHYLENE GLYCOL 3350 17 G/17G
17 POWDER, FOR SOLUTION ORAL DAILY
Status: DISPENSED | OUTPATIENT
Start: 2025-05-01

## 2025-05-01 RX ORDER — METOPROLOL SUCCINATE 50 MG/1
50 TABLET, EXTENDED RELEASE ORAL DAILY
Status: DISCONTINUED | OUTPATIENT
Start: 2025-05-01 | End: 2025-05-01 | Stop reason: HOSPADM

## 2025-05-01 RX ORDER — GUAIFENESIN/DEXTROMETHORPHAN 100-10MG/5
5 SYRUP ORAL EVERY 4 HOURS PRN
Status: DISPENSED | OUTPATIENT
Start: 2025-05-01

## 2025-05-01 RX ORDER — TRIAMTERENE/HYDROCHLOROTHIAZID 37.5-25 MG
1 TABLET ORAL DAILY
Status: DISCONTINUED | OUTPATIENT
Start: 2025-05-01 | End: 2025-05-01 | Stop reason: HOSPADM

## 2025-05-01 RX ORDER — AMOXICILLIN 250 MG
1 CAPSULE ORAL NIGHTLY
Status: DISPENSED | OUTPATIENT
Start: 2025-05-01

## 2025-05-01 RX ORDER — METOPROLOL SUCCINATE 50 MG/1
50 TABLET, EXTENDED RELEASE ORAL DAILY
Status: ACTIVE | OUTPATIENT
Start: 2025-05-02

## 2025-05-01 RX ORDER — TRIAMTERENE/HYDROCHLOROTHIAZID 37.5-25 MG
1 TABLET ORAL DAILY
Status: DISPENSED | OUTPATIENT
Start: 2025-05-02

## 2025-05-01 RX ORDER — HEPARIN SODIUM 10000 [USP'U]/100ML
0-4500 INJECTION, SOLUTION INTRAVENOUS CONTINUOUS
Status: DISCONTINUED | OUTPATIENT
Start: 2025-05-01 | End: 2025-05-01 | Stop reason: HOSPADM

## 2025-05-01 RX ORDER — ACETAMINOPHEN 500 MG
5 TABLET ORAL NIGHTLY PRN
Status: ACTIVE | OUTPATIENT
Start: 2025-05-01

## 2025-05-01 RX ORDER — HEPARIN SODIUM 10000 [USP'U]/100ML
0-4500 INJECTION, SOLUTION INTRAVENOUS CONTINUOUS
Status: DISCONTINUED | OUTPATIENT
Start: 2025-05-01 | End: 2025-05-02

## 2025-05-01 RX ORDER — IPRATROPIUM BROMIDE AND ALBUTEROL SULFATE 2.5; .5 MG/3ML; MG/3ML
3 SOLUTION RESPIRATORY (INHALATION) ONCE
Status: COMPLETED | OUTPATIENT
Start: 2025-05-01 | End: 2025-05-01

## 2025-05-01 RX ADMIN — IOHEXOL 75 ML: 350 INJECTION, SOLUTION INTRAVENOUS at 04:52

## 2025-05-01 RX ADMIN — HEPARIN SODIUM 1400 UNITS/HR: 10000 INJECTION, SOLUTION INTRAVENOUS at 21:05

## 2025-05-01 RX ADMIN — GUAIFENESIN AND DEXTROMETHORPHAN 5 ML: 100; 10 SYRUP ORAL at 22:20

## 2025-05-01 RX ADMIN — METOPROLOL SUCCINATE 50 MG: 50 TABLET, EXTENDED RELEASE ORAL at 11:59

## 2025-05-01 RX ADMIN — HEPARIN SODIUM 1600 UNITS/HR: 10000 INJECTION, SOLUTION INTRAVENOUS at 07:18

## 2025-05-01 RX ADMIN — IPRATROPIUM BROMIDE AND ALBUTEROL SULFATE 3 ML: 2.5; .5 SOLUTION RESPIRATORY (INHALATION) at 04:01

## 2025-05-01 RX ADMIN — TRIAMTERENE AND HYDROCHLOROTHIAZIDE 1 TABLET: 37.5; 25 TABLET ORAL at 11:59

## 2025-05-01 SDOH — ECONOMIC STABILITY: INCOME INSECURITY: IN THE PAST 12 MONTHS HAS THE ELECTRIC, GAS, OIL, OR WATER COMPANY THREATENED TO SHUT OFF SERVICES IN YOUR HOME?: NO

## 2025-05-01 SDOH — SOCIAL STABILITY: SOCIAL INSECURITY: WITHIN THE LAST YEAR, HAVE YOU BEEN AFRAID OF YOUR PARTNER OR EX-PARTNER?: NO

## 2025-05-01 SDOH — SOCIAL STABILITY: SOCIAL INSECURITY
WITHIN THE LAST YEAR, HAVE YOU BEEN RAPED OR FORCED TO HAVE ANY KIND OF SEXUAL ACTIVITY BY YOUR PARTNER OR EX-PARTNER?: NO

## 2025-05-01 SDOH — ECONOMIC STABILITY: FOOD INSECURITY: WITHIN THE PAST 12 MONTHS, THE FOOD YOU BOUGHT JUST DIDN'T LAST AND YOU DIDN'T HAVE MONEY TO GET MORE.: NEVER TRUE

## 2025-05-01 SDOH — SOCIAL STABILITY: SOCIAL INSECURITY: WITHIN THE LAST YEAR, HAVE YOU BEEN HUMILIATED OR EMOTIONALLY ABUSED IN OTHER WAYS BY YOUR PARTNER OR EX-PARTNER?: NO

## 2025-05-01 SDOH — SOCIAL STABILITY: SOCIAL INSECURITY: HAVE YOU HAD ANY THOUGHTS OF HARMING ANYONE ELSE?: NO

## 2025-05-01 SDOH — SOCIAL STABILITY: SOCIAL INSECURITY: ARE THERE ANY APPARENT SIGNS OF INJURIES/BEHAVIORS THAT COULD BE RELATED TO ABUSE/NEGLECT?: NO

## 2025-05-01 SDOH — SOCIAL STABILITY: SOCIAL INSECURITY
WITHIN THE LAST YEAR, HAVE YOU BEEN KICKED, HIT, SLAPPED, OR OTHERWISE PHYSICALLY HURT BY YOUR PARTNER OR EX-PARTNER?: NO

## 2025-05-01 SDOH — SOCIAL STABILITY: SOCIAL INSECURITY: ABUSE: ADULT

## 2025-05-01 SDOH — SOCIAL STABILITY: SOCIAL INSECURITY: DOES ANYONE TRY TO KEEP YOU FROM HAVING/CONTACTING OTHER FRIENDS OR DOING THINGS OUTSIDE YOUR HOME?: NO

## 2025-05-01 SDOH — ECONOMIC STABILITY: FOOD INSECURITY: WITHIN THE PAST 12 MONTHS, YOU WORRIED THAT YOUR FOOD WOULD RUN OUT BEFORE YOU GOT THE MONEY TO BUY MORE.: NEVER TRUE

## 2025-05-01 SDOH — SOCIAL STABILITY: SOCIAL INSECURITY: DO YOU FEEL ANYONE HAS EXPLOITED OR TAKEN ADVANTAGE OF YOU FINANCIALLY OR OF YOUR PERSONAL PROPERTY?: NO

## 2025-05-01 SDOH — SOCIAL STABILITY: SOCIAL INSECURITY: ARE YOU OR HAVE YOU BEEN THREATENED OR ABUSED PHYSICALLY, EMOTIONALLY, OR SEXUALLY BY ANYONE?: NO

## 2025-05-01 SDOH — SOCIAL STABILITY: SOCIAL INSECURITY: HAVE YOU HAD THOUGHTS OF HARMING ANYONE ELSE?: NO

## 2025-05-01 SDOH — SOCIAL STABILITY: SOCIAL INSECURITY: DO YOU FEEL UNSAFE GOING BACK TO THE PLACE WHERE YOU ARE LIVING?: NO

## 2025-05-01 SDOH — SOCIAL STABILITY: SOCIAL INSECURITY: HAS ANYONE EVER THREATENED TO HURT YOUR FAMILY OR YOUR PETS?: NO

## 2025-05-01 ASSESSMENT — PAIN SCALES - GENERAL
PAINLEVEL_OUTOF10: 0 - NO PAIN
PAINLEVEL_OUTOF10: 5 - MODERATE PAIN
PAINLEVEL_OUTOF10: 7
PAINLEVEL_OUTOF10: 4

## 2025-05-01 ASSESSMENT — COLUMBIA-SUICIDE SEVERITY RATING SCALE - C-SSRS
6. HAVE YOU EVER DONE ANYTHING, STARTED TO DO ANYTHING, OR PREPARED TO DO ANYTHING TO END YOUR LIFE?: NO
1. IN THE PAST MONTH, HAVE YOU WISHED YOU WERE DEAD OR WISHED YOU COULD GO TO SLEEP AND NOT WAKE UP?: NO
1. IN THE PAST MONTH, HAVE YOU WISHED YOU WERE DEAD OR WISHED YOU COULD GO TO SLEEP AND NOT WAKE UP?: NO
2. HAVE YOU ACTUALLY HAD ANY THOUGHTS OF KILLING YOURSELF?: NO
6. HAVE YOU EVER DONE ANYTHING, STARTED TO DO ANYTHING, OR PREPARED TO DO ANYTHING TO END YOUR LIFE?: NO
2. HAVE YOU ACTUALLY HAD ANY THOUGHTS OF KILLING YOURSELF?: NO

## 2025-05-01 ASSESSMENT — ACTIVITIES OF DAILY LIVING (ADL)
DRESSING YOURSELF: INDEPENDENT
HEARING - RIGHT EAR: FUNCTIONAL
WALKS IN HOME: INDEPENDENT
HEARING - LEFT EAR: FUNCTIONAL
LACK_OF_TRANSPORTATION: NO
BATHING: INDEPENDENT
FEEDING YOURSELF: INDEPENDENT
LACK_OF_TRANSPORTATION: NO
JUDGMENT_ADEQUATE_SAFELY_COMPLETE_DAILY_ACTIVITIES: YES
PATIENT'S MEMORY ADEQUATE TO SAFELY COMPLETE DAILY ACTIVITIES?: YES
ADEQUATE_TO_COMPLETE_ADL: YES
GROOMING: INDEPENDENT
TOILETING: INDEPENDENT

## 2025-05-01 ASSESSMENT — COGNITIVE AND FUNCTIONAL STATUS - GENERAL
PATIENT BASELINE BEDBOUND: NO
MOBILITY SCORE: 24
DAILY ACTIVITIY SCORE: 24

## 2025-05-01 ASSESSMENT — PAIN DESCRIPTION - PAIN TYPE: TYPE: ACUTE PAIN

## 2025-05-01 ASSESSMENT — PAIN - FUNCTIONAL ASSESSMENT
PAIN_FUNCTIONAL_ASSESSMENT: 0-10
PAIN_FUNCTIONAL_ASSESSMENT: 0-10

## 2025-05-01 ASSESSMENT — LIFESTYLE VARIABLES
HOW MANY STANDARD DRINKS CONTAINING ALCOHOL DO YOU HAVE ON A TYPICAL DAY: PATIENT DOES NOT DRINK
SKIP TO QUESTIONS 9-10: 1
AUDIT-C TOTAL SCORE: 0
HOW OFTEN DO YOU HAVE A DRINK CONTAINING ALCOHOL: NEVER
HOW OFTEN DO YOU HAVE 6 OR MORE DRINKS ON ONE OCCASION: NEVER
AUDIT-C TOTAL SCORE: 0

## 2025-05-01 ASSESSMENT — PATIENT HEALTH QUESTIONNAIRE - PHQ9
SUM OF ALL RESPONSES TO PHQ9 QUESTIONS 1 & 2: 0
1. LITTLE INTEREST OR PLEASURE IN DOING THINGS: NOT AT ALL
2. FEELING DOWN, DEPRESSED OR HOPELESS: NOT AT ALL

## 2025-05-01 ASSESSMENT — PAIN DESCRIPTION - LOCATION: LOCATION: CHEST

## 2025-05-01 ASSESSMENT — PAIN DESCRIPTION - DESCRIPTORS: DESCRIPTORS: ACHING

## 2025-05-01 NOTE — NURSING NOTE
Patient arrived from [facility] ED via transport. Heparin gtt running at 14 units/hour, transferred to our pump. Double check performed w/ Fabiana PICKARD RN. When orders placed will document this in the MAR.     Sade Dalal RN

## 2025-05-01 NOTE — ED PROVIDER NOTES
HPI   Chief Complaint   Patient presents with    Chest Pain       HPI: 34-year-old female with no reported significant past medical history presents with chest tightness, cough and shortness of breath.  She reports that she recently got over a lower respiratory infection and several members of her household had similar symptoms.  Since then she reports a sensation of mucus in her throat.  She is occasionally coughing forcefully which is causing her chest tightness.  She denies leg swelling and history of DVT/PE      Limitations to history: None  Independent Historians: Patient  External Records Reviewed: HIE, outpatient notes, inpatient notes  ------------------------------------------------------------------------------------------------------------------------------------------  ROS: a ten point review of systems was performed and was negative except as per HPI.  ------------------------------------------------------------------------------------------------------------------------------------------  PMH / PSH: as per HPI, otherwise reviewed in EMR  MEDS: as per HPI, otherwise reviewed in EMR  ALLERGIES: as per HPI, otherwise reviewed in EMR  SocH:  as per HPI, otherwise reviewed in EMR  FH:  as per HPI, otherwise reviewed in EMR  ------------------------------------------------------------------------------------------------------------------------------------------  Physical Exam:  VS: As documented in the triage note and EMR flowsheet from this visit was reviewed  General: Well appearing. No acute distress.   Eyes:  Extraocular movements grossly intact. No scleral icterus. No discharge  HEENT:  Normocephalic.  Atraumatic  Neck: Moves neck freely. No gross masses  CV: Regular rhythm. No murmurs, rubs or gallops   Resp: Clear to auscultation bilaterally. No respiratory distress.    GI: Soft, no masses, nontender. No rebound tenderness or guarding  MSK: Symmetric muscle bulk. No deformities. No lower extremity  edema.    Skin: Warm, dry, intact.   Neuro: No focal deficits.  A&O x3.   Psych: Appropriate for situation  ------------------------------------------------------------------------------------------------------------------------------------------  Hospital Course / Medical Decision Making:  Independent Interpretations: ***  EKG as interpreted by me: ***    MDM: ***    Discussion of Management with Other Providers:   I discussed the patient/results with: Emergency medicine team    Final diagnosis and disposition as below.    Results for orders placed or performed in visit on 10/01/24  -Vaginitis Gram Stain For Bacterial Vaginosis + Yeast:   Collection Time: 10/01/24 12:01 PM  Specimen: Vaginal; Swab       Result                      Value             Ref Range           Belinda Score                7 (H)             0 - 3               Yeast                       ABSENT            ABSENT              Clue Cells                  PRESENT (A)       ABSENT         XR chest 1 view    (Results Pending)                Patient History   Medical History[1]  Surgical History[2]  Family History[3]  Social History[4]    Physical Exam   ED Triage Vitals [05/01/25 0156]   Temperature Heart Rate Respirations BP   36.8 °C (98.2 °F) 92 18 135/90      Pulse Ox Temp Source Heart Rate Source Patient Position   100 % Oral Monitor Sitting      BP Location FiO2 (%)     Left arm --       Physical Exam      ED Course & MDM                  No data recorded     [place] Coma Scale Score: 15 (05/01/25 0200 : Matthew Cohn RN)                           Medical Decision Making      Procedure  Procedures       [1]   Past Medical History:  Diagnosis Date    Blepharochalasis left eye, unspecified eyelid 11/19/2018    Blepharochalasis of left eye    Blepharochalasis left upper eyelid 04/07/2017    Blepharochalasis left upper eyelid    Body mass index (BMI) 38.0-38.9, adult 12/29/2016    BMI 38.0-38.9,adult    Chronic sinusitis, unspecified  12/18/2017    Other sinusitis    Edema of left upper eyelid 04/07/2017    Edema of left upper eyelid    Encounter for gynecological examination (general) (routine) without abnormal findings 05/18/2017    Encounter for annual routine gynecological examination    Encounter for routine postpartum follow-up 02/07/2017    Postpartum care following vaginal delivery    Essential (primary) hypertension 02/07/2017    Benign essential hypertension    Hypertension     Impacted cerumen, left ear 10/13/2017    Impacted cerumen of left ear    Localized edema 10/30/2014    Periorbital edema    Low back pain, unspecified 04/18/2018    Dorsalgia of lumbar region    Obstructive sleep apnea (adult) (pediatric) 10/01/2019    Obstructive sleep apnea of adult    Obstructive sleep apnea (adult) (pediatric) 12/04/2014    Obstructive sleep apnea    Other conditions influencing health status     Excessive mucus secretion    Other conditions influencing health status     History of recent childbirth    Personal history of other diseases of the circulatory system 10/28/2015    History of cardiac murmur    Personal history of other diseases of the nervous system and sense organs 04/07/2017    History of papilledema    Personal history of other diseases of the nervous system and sense organs 09/23/2015    History of papilledema    Personal history of other diseases of the respiratory system 06/02/2020    History of allergic rhinitis    Personal history of other endocrine, nutritional and metabolic disease 10/30/2015    History of hypokalemia    Personal history of other specified conditions 08/01/2016    History of headache    Personal history of other specified conditions 04/07/2017    History of headache    Personal history of other specified conditions 05/18/2017    History of abdominal pain    Polyp of cervix uteri 03/17/2016    Cervical polyp    Pure hypercholesterolemia, unspecified 02/20/2015    Low-density-lipoid-type (LDL)  hyperlipoproteinemia    Scotoma of blind spot area, left eye 04/07/2017    Enlarged blind spot of left eye    Sleep apnea     Supervision of pregnancy with other poor reproductive or obstetric history, first trimester ([facility]) 12/29/2016    History of pre-eclampsia in prior pregnancy, currently pregnant, first trimester    Unspecified acute lower respiratory infection 12/18/2017    Lower respiratory infection (e.g., bronchitis, pneumonia, pneumonitis, pulmonitis)    Unspecified asthma, uncomplicated ([facility]) 06/02/2020    Asthmatic bronchitis    Unspecified visual disturbance 04/07/2017    Transient vision disturbance   [2]   Past Surgical History:  Procedure Laterality Date    OTHER SURGICAL HISTORY  02/07/2017    Tubal Ligation After Vaginal Delivery (Same Hospitalization)   [3]   Family History  Problem Relation Name Age of Onset    Ovarian cancer Mother      Diabetes Father      Hypertension Father      Diabetes Other grandparent     Stroke Other grandparent     Breast cancer Neg Hx      Colon cancer Neg Hx     [4]   Social History  Tobacco Use    Smoking status: Never     Passive exposure: Never    Smokeless tobacco: Never   Vaping Use    Vaping status: Never Used   Substance Use Topics    Alcohol use: Yes     Comment: Socially    Drug use: Never      qualitative detection and differentiation of SARS CoV-2/ Influenza A/B from nasopharyngeal specimens collected from individuals with signs and symptoms of respiratory tract infections, and has been validated for use at [facility]. Negative results do not preclude COVID-19/ Influenza A/B infections and should not be used as the sole basis for diagnosis, treatment, or other management decisions. Testing for SARS CoV-2 is recommended only for patients who meet current clinical and/or epidemiological criteria defined by federal, state, or local public health directives.  RSV PCR - Normal     RSV PCR                                                  Narrative: This assay is an FDA-cleared, in vitro diagnostic nucleic acid amplification test for the detection of RSV from nasopharyngeal specimens, and has been validated for use at [facility]. Negative results do not preclude RSV infections, and should not be used as the sole basis for diagnosis, treatment, or other management decisions. If Influenza A/B and RSV PCR results are negative, testing for Parainfluenza virus, Adenovirus and Metapneumovirus is routinely performed for pediatric oncology and intensive care inpatients at [facility], and is available on other patients by placing an add-on request.                                      APTT - Normal     aPTT                          32                         Narrative: The APTT is no longer used for monitoring Unfractionated Heparin Therapy. For monitoring Heparin Therapy, use the Heparin Assay.  HEPARIN ASSAY - Normal     Heparin Unfractionated        0.8                        Narrative: The therapeutic reference range for UFH may be either 0.3-0.6 IU/mL or 0.3-0.7 IU/mL based on the clinical setting for anticoagulant therapy and the associated nomogram used. For Heparin dosing guidelines based on clinical scenario and Heparin Assay results, please refer to local Pharmacy and  the [facility] Guidelines for Anticoagulation Therapy available on the [facility] intranet at: [email or web address]  HEPARIN ASSAY - Normal    CT angio chest for pulmonary embolism   Final Result    Addendum (preliminary) 1 of 1    NOTIFICATION:  The results of the study were discussed with, and    acknowledged by Dr. Trav Hernandez, by telephone on 5/1/2025 at 0553    hours.    Signed by Ye Santos MD       Final    Approximately 3.6 x 3.8 x 3.3 cm lobulated, partially calcified right    hilar mass most likely reflects malignancy.  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    The right upper lobe pulmonary artery does not opacify and may contain    bland thrombus secondary to compression by the mass or tumor thrombus.     The remaining pulmonary arteries are patent.    The right hilar mass mass exerts significant mass effect on the    superior vena cava which is nearly occluded.    Signed by Ye Santos MD     XR chest 1 view   Final Result    New 2 cm RLL nodule may represent neoplasm. Recommend correlation with    CT chest.    Signed by Kevin Teran MD                     Patient History   Medical History[1]  Surgical History[2]  Family History[3]  Social History[4]    Physical Exam   ED Triage Vitals [05/01/25 0156]   Temperature Heart Rate Respirations BP   36.8 °C (98.2 °F) 92 18 135/90      Pulse Ox Temp Source Heart Rate Source Patient Position   100 % Oral Monitor Sitting      BP Location FiO2 (%)     Left arm --       Physical Exam      ED Course & [facility]   ED Course as of 05/08/25 1855   Thu May 01, 2025   0735 Discussed case with Dr. Morales of thoracic surgery given new hilar mass/ concern for developing SVC syndrome, he conveys no need for urgent operative intervention. Requested to discuss case with oncology for potential transfer for  expedited diagnostic evaluation/ initiation of therapy [BR]   0816 Discussed case with Dr. Coughlin of oncology at [facility], given concern for compression of the superior vena cava from new identified mass he agrees to except the patient in transfer. [BR]      ED Course User Index  [BR] Ilya Garcias MD         Diagnoses as of 05/08/25 1855   Hilar mass   Pulmonary artery thrombosis (Multi)                 No data recorded     [place] Coma Scale Score: 15 (05/01/25 0200 : Matthew Cohn RN)                           Medical Decision Making      Procedure  Procedures       [1]   Past Medical History:  Diagnosis Date    Blepharochalasis left eye, unspecified eyelid 11/19/2018    Blepharochalasis of left eye    Blepharochalasis left upper eyelid 04/07/2017    Blepharochalasis left upper eyelid    Body mass index (BMI) 38.0-38.9, adult 12/29/2016    BMI 38.0-38.9,adult    Chronic sinusitis, unspecified 12/18/2017    Other sinusitis    Edema of left upper eyelid 04/07/2017    Edema of left upper eyelid    Encounter for gynecological examination (general) (routine) without abnormal findings 05/18/2017    Encounter for annual routine gynecological examination    Encounter for routine postpartum follow-up 02/07/2017    Postpartum care following vaginal delivery    Essential (primary) hypertension 02/07/2017    Benign essential hypertension    Hypertension     Impacted cerumen, left ear 10/13/2017    Impacted cerumen of left ear    Localized edema 10/30/2014    Periorbital edema    Low back pain, unspecified 04/18/2018    Dorsalgia of lumbar region    Obstructive sleep apnea (adult) (pediatric) 10/01/2019    Obstructive sleep apnea of adult    Obstructive sleep apnea (adult) (pediatric) 12/04/2014    Obstructive sleep apnea    Other conditions influencing health status     Excessive mucus secretion    Other conditions influencing health status     History of recent childbirth    Personal history of other  diseases of the circulatory system 10/28/2015    History of cardiac murmur    Personal history of other diseases of the nervous system and sense organs 04/07/2017    History of papilledema    Personal history of other diseases of the nervous system and sense organs 09/23/2015    History of papilledema    Personal history of other diseases of the respiratory system 06/02/2020    History of allergic rhinitis    Personal history of other endocrine, nutritional and metabolic disease 10/30/2015    History of hypokalemia    Personal history of other specified conditions 08/01/2016    History of headache    Personal history of other specified conditions 04/07/2017    History of headache    Personal history of other specified conditions 05/18/2017    History of abdominal pain    Polyp of cervix uteri 03/17/2016    Cervical polyp    Pure hypercholesterolemia, unspecified 02/20/2015    Low-density-lipoid-type (LDL) hyperlipoproteinemia    Scotoma of blind spot area, left eye 04/07/2017    Enlarged blind spot of left eye    Sleep apnea     Supervision of pregnancy with other poor reproductive or obstetric history, first trimester ([facility]) 12/29/2016    History of pre-eclampsia in prior pregnancy, currently pregnant, first trimester    Unspecified acute lower respiratory infection 12/18/2017    Lower respiratory infection (e.g., bronchitis, pneumonia, pneumonitis, pulmonitis)    Unspecified asthma, uncomplicated ([facility]) 06/02/2020    Asthmatic bronchitis    Unspecified visual disturbance 04/07/2017    Transient vision disturbance   [2]   Past Surgical History:  Procedure Laterality Date    OTHER SURGICAL HISTORY  02/07/2017    Tubal Ligation After Vaginal Delivery (Same Hospitalization)   [3]   Family History  Problem Relation Name Age of Onset    Ovarian cancer Mother      Diabetes Father      Hypertension Father      Diabetes Other grandparent     Stroke Other grandparent     Breast cancer Neg Hx      Colon cancer Neg Hx      [4]   Social History  Tobacco Use    Smoking status: Never     Passive exposure: Never    Smokeless tobacco: Never   Vaping Use    Vaping status: Never Used   Substance Use Topics    Alcohol use: Yes     Comment: Socially    Drug use: Never        Trav Hernandez DO  05/08/25 190

## 2025-05-01 NOTE — ED TRIAGE NOTES
Pt BIBPV from home with complaints of chest tightness, cough, and shortness of breath. Pt states she had an itch feeling in her throat yesterday, she feels like there is mucus stuck in her throat. VSS.

## 2025-05-01 NOTE — PROGRESS NOTES
Received patient in signout from Dr. Hernandez. In short the patient is a 34 y.o. female presenting with cough, respiratory symptoms. In the ED, initial x-ray showed nodular opacity in the right lung, subsequent CT scan shows hilar mass concerning for malignancy with compression of the SVC as well as occlusion of right pulmonary artery, out of concern for acute thrombus patient was started on heparin.  Patient otherwise saturating well on room air, hemodynamically stable throughout her ED stay, given newly identified intrathoracic mass with concern for developing SVC syndrome, plan for discussion with thoracic surgery/oncology at [facility], transfer order placed.    . Patient *** in *** condition.   transfer for expedited diagnostic evaluation/ initiation of therapy [BR]   0849 Discussed case with Dr. Coughlin of oncology at [facility], given concern for compression of the superior vena cava from new identified mass he agrees to except the patient in transfer. [BR]      ED Course User Index  [BR] Ilya Garcias MD         Diagnoses as of 05/06/25 0018   Hilar mass   Pulmonary artery thrombosis (Multi)

## 2025-05-01 NOTE — PROGRESS NOTES
05/01/25 0922   Discharge Planning   Living Arrangements Children   Support Systems Children;Parent   Assistance Needed heparin gtt, oncology consult   Type of Residence Private residence   Number of Stairs to Enter Residence 3   Number of Stairs Within Residence 12   Home or Post Acute Services Other (Comment)   Expected Discharge Disposition Short Term A  (transfer to [facility])   Does the patient need discharge transport arranged? Yes   RoundTrip coordination needed? Yes   Has discharge transport been arranged? Yes   What day is the transport expected? 05/01/25   Financial Resource Strain   How hard is it for you to pay for the very basics like food, housing, medical care, and heating? Not very   Housing Stability   In the last 12 months, was there a time when you were not able to pay the mortgage or rent on time? N   In the past 12 months, how many times have you moved where you were living? 1   At any time in the past 12 months, were you homeless or living in a shelter (including now)? N   Transportation Needs   In the past 12 months, has lack of transportation kept you from medical appointments or from getting medications? no   In the past 12 months, has lack of transportation kept you from meetings, work, or from getting things needed for daily living? No   Stroke Family Assessment   Stroke Family Assessment Needed No   Intensity of Service   Intensity of Service 0-30 min

## 2025-05-01 NOTE — PROGRESS NOTES
Pharmacy Medication History     Source of Information: PATIENT    Additional concerns with the patient's PTA list.     Notified Provider via Haiku : No    The following updates were made to the Prior to Admission medication list:     Medications ADDED:   N/A  Medications CHANGED:  N/A  Medications REMOVED:   N/A  Medications NOT TAKING:   N/A    Allergy reviewed : Yes    Meds 2 Beds : Yes    Outpatient pharmacy confirmed and updated in chart : Yes    [facility]    The list below reflectives the updated PTA list. Please review each medication in order reconciliation for additional clarification and justification.    Prior to Admission Medications   Prescriptions Last Dose      blood pressure test kit-large kit       Si device used daily for blood pressure monitoring   metoprolol succinate XL (Toprol-XL) 50 mg 24 hr tablet 2025 Evening      Sig: Take 1 tablet (50 mg) by mouth once daily. Do not crush or chew.   phentermine 37.5 mg capsule 2025 Evening      Sig: Take 1 capsule (37.5 mg) by mouth once daily in the morning. Take before meals.   phentermine 37.5 mg capsule       Sig: Take 1 capsule (37.5 mg) by mouth once daily in the morning. Take before meals. Do not fill before May 23, 2025.   phentermine 37.5 mg capsule       Sig: Take 1 capsule (37.5 mg) by mouth once daily in the morning. Take before meals. Do not fill before 2025.   triamterene-hydrochlorothiazid (Maxzide-25) 37.5-25 mg tablet 2025 Evening      Sig: Take 1 tablet by mouth once daily.      Facility-Administered Medications: None       The list below reflectives the updated allergy list. Please review each documented allergy for additional clarification and justification.    No Known Allergies       25 at 9:59 AM - Cecy Narayanan

## 2025-05-02 ENCOUNTER — APPOINTMENT (OUTPATIENT)
Dept: CARDIOLOGY | Facility: HOSPITAL | Age: 35
End: 2025-05-02
Payer: COMMERCIAL

## 2025-05-02 ENCOUNTER — APPOINTMENT (OUTPATIENT)
Dept: RADIOLOGY | Facility: HOSPITAL | Age: 35
End: 2025-05-02
Payer: COMMERCIAL

## 2025-05-02 LAB
ALBUMIN SERPL BCP-MCNC: 4.2 G/DL (ref 3.4–5)
ANION GAP SERPL CALC-SCNC: 16 MMOL/L (ref 10–20)
AORTIC VALVE PEAK VELOCITY: 1.24 M/S
ATRIAL RATE: 90 BPM
AV PEAK GRADIENT: 6 MMHG
AVA (PEAK VEL): 3.1 CM2
BASOPHILS # BLD AUTO: 0.03 X10*3/UL (ref 0–0.1)
BASOPHILS NFR BLD AUTO: 0.6 %
BUN SERPL-MCNC: 10 MG/DL (ref 6–23)
CALCIUM SERPL-MCNC: 9.7 MG/DL (ref 8.6–10.6)
CHLORIDE SERPL-SCNC: 102 MMOL/L (ref 98–107)
CO2 SERPL-SCNC: 24 MMOL/L (ref 21–32)
CREAT SERPL-MCNC: 1.06 MG/DL (ref 0.5–1.05)
EGFRCR SERPLBLD CKD-EPI 2021: 70 ML/MIN/1.73M*2
EJECTION FRACTION APICAL 4 CHAMBER: 73
EJECTION FRACTION: 68 %
EOSINOPHIL # BLD AUTO: 0.09 X10*3/UL (ref 0–0.7)
EOSINOPHIL NFR BLD AUTO: 1.8 %
ERYTHROCYTE [DISTWIDTH] IN BLOOD BY AUTOMATED COUNT: 13 % (ref 11.5–14.5)
GLUCOSE SERPL-MCNC: 85 MG/DL (ref 74–99)
HCT VFR BLD AUTO: 37.5 % (ref 36–46)
HGB BLD-MCNC: 12 G/DL (ref 12–16)
IMM GRANULOCYTES # BLD AUTO: 0.02 X10*3/UL (ref 0–0.7)
IMM GRANULOCYTES NFR BLD AUTO: 0.4 % (ref 0–0.9)
INR PPP: 1.2 (ref 0.9–1.1)
LDH SERPL L TO P-CCNC: 196 U/L (ref 84–246)
LEFT ATRIUM VOLUME AREA LENGTH INDEX BSA: 19.4 ML/M2
LEFT VENTRICLE INTERNAL DIMENSION DIASTOLE: 4.3 CM (ref 3.5–6)
LEFT VENTRICULAR OUTFLOW TRACT DIAMETER: 2.2 CM
LYMPHOCYTES # BLD AUTO: 1.72 X10*3/UL (ref 1.2–4.8)
LYMPHOCYTES NFR BLD AUTO: 33.5 %
MAGNESIUM SERPL-MCNC: 2.04 MG/DL (ref 1.6–2.4)
MCH RBC QN AUTO: 27.6 PG (ref 26–34)
MCHC RBC AUTO-ENTMCNC: 32 G/DL (ref 32–36)
MCV RBC AUTO: 86 FL (ref 80–100)
MITRAL VALVE E/A RATIO: 1.22
MONOCYTES # BLD AUTO: 0.24 X10*3/UL (ref 0.1–1)
MONOCYTES NFR BLD AUTO: 4.7 %
NEUTROPHILS # BLD AUTO: 3.03 X10*3/UL (ref 1.2–7.7)
NEUTROPHILS NFR BLD AUTO: 59 %
NRBC BLD-RTO: 0 /100 WBCS (ref 0–0)
P AXIS: 45 DEGREES
P OFFSET: 187 MS
P ONSET: 140 MS
PHOSPHATE SERPL-MCNC: 3.1 MG/DL (ref 2.5–4.9)
PLATELET # BLD AUTO: 341 X10*3/UL (ref 150–450)
POTASSIUM SERPL-SCNC: 3.8 MMOL/L (ref 3.5–5.3)
PR INTERVAL: 166 MS
PROTHROMBIN TIME: 13.3 SECONDS (ref 9.8–12.4)
Q ONSET: 223 MS
QRS COUNT: 15 BEATS
QRS DURATION: 80 MS
QT INTERVAL: 372 MS
QTC CALCULATION(BAZETT): 455 MS
QTC FREDERICIA: 425 MS
R AXIS: 50 DEGREES
RBC # BLD AUTO: 4.35 X10*6/UL (ref 4–5.2)
RIGHT VENTRICLE FREE WALL PEAK S': 10 CM/S
SODIUM SERPL-SCNC: 138 MMOL/L (ref 136–145)
T AXIS: 42 DEGREES
T OFFSET: 409 MS
TRICUSPID ANNULAR PLANE SYSTOLIC EXCURSION: 2.3 CM
UFH PPP CHRO-ACNC: 0.5 IU/ML (ref ?–1.1)
URATE SERPL-MCNC: 6 MG/DL (ref 2.3–6.7)
VENTRICULAR RATE: 90 BPM
WBC # BLD AUTO: 5.1 X10*3/UL (ref 4.4–11.3)

## 2025-05-02 PROCEDURE — 83735 ASSAY OF MAGNESIUM: CPT

## 2025-05-02 PROCEDURE — 74177 CT ABD & PELVIS W/CONTRAST: CPT

## 2025-05-02 PROCEDURE — 2500000001 HC RX 250 WO HCPCS SELF ADMINISTERED DRUGS (ALT 637 FOR MEDICARE OP): Mod: SE

## 2025-05-02 PROCEDURE — 85610 PROTHROMBIN TIME: CPT

## 2025-05-02 PROCEDURE — 93971 EXTREMITY STUDY: CPT | Performed by: STUDENT IN AN ORGANIZED HEALTH CARE EDUCATION/TRAINING PROGRAM

## 2025-05-02 PROCEDURE — 84550 ASSAY OF BLOOD/URIC ACID: CPT

## 2025-05-02 PROCEDURE — 99233 SBSQ HOSP IP/OBS HIGH 50: CPT | Performed by: STUDENT IN AN ORGANIZED HEALTH CARE EDUCATION/TRAINING PROGRAM

## 2025-05-02 PROCEDURE — 93970 EXTREMITY STUDY: CPT

## 2025-05-02 PROCEDURE — 2550000001 HC RX 255 CONTRASTS: Mod: SE | Performed by: INTERNAL MEDICINE

## 2025-05-02 PROCEDURE — 83615 LACTATE (LD) (LDH) ENZYME: CPT

## 2025-05-02 PROCEDURE — 85520 HEPARIN ASSAY: CPT

## 2025-05-02 PROCEDURE — 85025 COMPLETE CBC W/AUTO DIFF WBC: CPT

## 2025-05-02 PROCEDURE — 36415 COLL VENOUS BLD VENIPUNCTURE: CPT

## 2025-05-02 PROCEDURE — 93306 TTE W/DOPPLER COMPLETE: CPT

## 2025-05-02 PROCEDURE — 2500000004 HC RX 250 GENERAL PHARMACY W/ HCPCS (ALT 636 FOR OP/ED): Mod: JZ,SE

## 2025-05-02 PROCEDURE — 1200000003 HC ONCOLOGY  ROOM WITH TELEMETRY DAILY

## 2025-05-02 PROCEDURE — 99233 SBSQ HOSP IP/OBS HIGH 50: CPT | Performed by: INTERNAL MEDICINE

## 2025-05-02 PROCEDURE — 80069 RENAL FUNCTION PANEL: CPT

## 2025-05-02 PROCEDURE — 74177 CT ABD & PELVIS W/CONTRAST: CPT | Performed by: RADIOLOGY

## 2025-05-02 RX ORDER — ENOXAPARIN SODIUM 100 MG/ML
40 INJECTION SUBCUTANEOUS EVERY 24 HOURS
Status: DISCONTINUED | OUTPATIENT
Start: 2025-05-03 | End: 2025-05-04

## 2025-05-02 RX ADMIN — HEPARIN SODIUM 1400 UNITS/HR: 10000 INJECTION, SOLUTION INTRAVENOUS at 15:33

## 2025-05-02 RX ADMIN — GUAIFENESIN AND DEXTROMETHORPHAN 5 ML: 100; 10 SYRUP ORAL at 20:18

## 2025-05-02 RX ADMIN — IOHEXOL 75 ML: 350 INJECTION, SOLUTION INTRAVENOUS at 12:40

## 2025-05-02 ASSESSMENT — PAIN SCALES - GENERAL
PAINLEVEL_OUTOF10: 0 - NO PAIN

## 2025-05-02 ASSESSMENT — COGNITIVE AND FUNCTIONAL STATUS - GENERAL
MOBILITY SCORE: 24
DAILY ACTIVITIY SCORE: 24
MOBILITY SCORE: 24
DAILY ACTIVITIY SCORE: 24

## 2025-05-02 ASSESSMENT — PAIN - FUNCTIONAL ASSESSMENT
PAIN_FUNCTIONAL_ASSESSMENT: 0-10
PAIN_FUNCTIONAL_ASSESSMENT: 0-10

## 2025-05-02 ASSESSMENT — ACTIVITIES OF DAILY LIVING (ADL): LACK_OF_TRANSPORTATION: NO

## 2025-05-02 NOTE — CARE PLAN
Problem: Pain - Adult  Goal: Verbalizes/displays adequate comfort level or baseline comfort level  Outcome: Progressing     Problem: Safety - Adult  Goal: Free from fall injury  Outcome: Progressing     Problem: Discharge Planning  Goal: Discharge to home or other facility with appropriate resources  Outcome: Progressing     Problem: Chronic Conditions and Co-morbidities  Goal: Patient's chronic conditions and co-morbidity symptoms are monitored and maintained or improved  Outcome: Progressing     Problem: Nutrition  Goal: Nutrient intake appropriate for maintaining nutritional needs  Outcome: Progressing     The clinical goals for the shift include Pt will be safe and free from injury throughout shift.

## 2025-05-02 NOTE — PROGRESS NOTES
Pharmacy Medication History Review    Tatiana Prabhakar is a 34 y.o. female admitted for SVC (superior vena cava obstruction). Pharmacy reviewed the patient's [id number] medications and allergies for accuracy.    Medications ADDED  Women's one a day multivitamin   Medications CHANGED  N/A  Medications REMOVED/NOT TAKING   Phentermine 37.5 mg - duplicate future fills     The list below reflects the updated PTA list.   Prior to Admission Medications   Prescriptions Last Dose Informant   blood pressure test kit-large kit  Self   Si device used daily for blood pressure monitoring   metoprolol succinate XL (Toprol-XL) 50 mg 24 hr tablet 2025 Self   Sig: Take 1 tablet (50 mg) by mouth once daily. Do not crush or chew.   multivit,calc,mins/iron/folic (ONE-A-DAY WOMENS FORMULA ORAL)  Self   Sig: Take 1 tablet by mouth once daily.   phentermine 37.5 mg capsule  Self   Sig: Take 1 capsule (37.5 mg) by mouth once daily in the morning. Take before meals.   phentermine 37.5 mg capsule Not Taking Self   Sig: Take 1 capsule (37.5 mg) by mouth once daily in the morning. Take before meals. Do not fill before May 23, 2025.   Patient not taking: Reported on 2025 Do not fill before May 23, 2025.  Duplicate- future fill    phentermine 37.5 mg capsule Not Taking Self   Sig: Take 1 capsule (37.5 mg) by mouth once daily in the morning. Take before meals. Do not fill before 2025.   Patient not taking: Reported on 2025 Do not fill before 2025.  Duplicate- future fill    triamterene-hydrochlorothiazid (Maxzide-25) 37.5-25 mg tablet 2025 Self   Sig: Take 1 tablet by mouth once daily.      Facility-Administered Medications: None       The list below reflects the updated allergy list. Please review each documented allergy for additional clarification and justification.  Allergies  Reviewed by Sade Dalal RN on 2025   No Known Allergies         Patient accepts M2B at discharge. Pharmacy has been  updated to [facility].    Sources  [facility]  Pharmacy dispense history  Patient Interview Good historian     Additional Comments  See highlighted sections in above table     Addy Babin, PharmD  [facility]  [id number]  [facility], [address]  [place] [address]  Phone: [phone]  Please reach out via Secure Chat for questions, or if no response call [facility] or [employer]

## 2025-05-02 NOTE — HOSPITAL COURSE
34 year old female who presented with chest tightness, SOB, and productive cough with CT PE on admission sig. for 3.6 x 3.8 x 3.3 cm lobulated, partly calcified R hilar mass and suspicion for RUL pulmonary artery thrombus or embolus, with nearly occluded SVC with a PMHx of HTN, MICHELLE (not on CPAP), chronic constipation, and obesity. Prior imaging 3/2021 showed conglomerate of partially calcified lymph nodes in right paratrachial/hilar region, enlarged compared to first identification in 9/2019. CT A/P significant for sequelae of granulomatous disease in the liver and spleen.Duplex US of LE negative for DVT. The patient was initially treated with a heparin gtt but this was discontinued due to low suspicion for pulmonary artery thrombus.     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 Since her CT did not show evidence of active disease, the pulmonology team felt that the patient would not benefit from rituximab this admission. They recommended close outpatient follow-up with pulmonology (within four weeks of discharge).     On 5/5/25, the patient underwent a SVC stent placement by interventional radiology.     To Do:  - page IR on 5/5/ AM to get patient added for SVC stent- patient is NPO @ midnight   - additional pulm reccs, if any   - PT/OT, could likely go home if feeling well after the stent placement (and if okay with IR)

## 2025-05-02 NOTE — H&P
Oncology Admission Note    Chief concern:  C/f new malignancy to R lung, SVC syndrome, thrombus/emboli to R Pulm art      History Of Present Illness  Tatiana Prabhakar is a 34 y.o. female w/a PMH of HTN, MICHELLE (not on CPAP), Chronic Constipation and Obesity who initially presented to [facility] w/ complaints of chest tightness, cough and SOB. Less c/f ACS given negative trops and unremarkable EKG findings. CXR showed suspicious 2cm RLL nodule c/f malignancy and A CT PE done showed 3.6 x 3.8 x 3.3 cm lobulated R hilar mass suspected to be malignant. Further findings of R Pulm art flow defect c/f thrombus/emboli and severe flow restriction to the SVC. She was then started on Heparin gtt and transferred to [facility] for continued management and malignancy w/up.     When seen at the bedside, pt was A/O x4, resting comfortably but anxious about new findings. Mother, sister and son were at the bedside for support and participated in the encounter. She reports only major diagnosis was HTN for which she was on Amlodipine, Metop Succ and thiazide diuretic. She noticed CAAL and orthostatic sx that have been ongoing ~1yr. When brought up w/ PCP, they thought likely 2/2 anti-HTN meds and Amlodipine was taken off. She then made active decision for weight loss by diet modification, going to the gym. She started Phentermine 6/2024 and has had ~50Ibs weight loss since. She endorsed some weight loss prior to its start but unclear how much. She also stated recent respiratory infection and that multiple family members had had similar sx. Her sx however continued to persist after others' resolved. Cough was described as minimally productive. She states occasional streaks of blood in stool, last was 5/1 PM at [facility]. Pt states that she has had constipation since childhood and has had Cscope ~2012 w/ no clear etiology. She denied any PID, abdominal surgeries. She currently denies fever/chills, NV, headache/lightheadedness, chest pain/tightness,  "dyspnea, abdominal pain, D/C, dysuria, lower extremity pain.     Pt recalls being seen w/ a lung nodule in the past but had not followed up on this. On chart check, a CT Chest w/ contrast was done 3/2021 that showed reported findings below. Images are not available for comparison to current.   1. Conglomerate of partially calcified lymph nodes in the rightparatracheal/hilar region, likely related to granulomatous disease, enlarged compared to 1.5 years ago.  2. Enlarging lymph nodes in the mid right paratracheal region cause increased compression of the superior vena cava which is slit like near the atrial caval junction and nearly occluded.  3. As before, these lymph nodes appear to encase and chronically occlude the right upper and middle lobar pulmonary arteries as well as the right superior pulmonary vein. There is no significant change in interstitial edema with peribronchovascular nodularity involving the right upper and middle lobes which may be related to vascular congestion and granulomatous disease.       In the ED:  Vital signs:  /78 (BP Location: Left arm, Patient Position: Lying)   Pulse 62   Temp 36.8 °C (98.2 °F) (Temporal)   Resp 16   Ht 1.753 m (5' 9\")   Wt 90.7 kg (200 lb)   SpO2 100%   BMI 29.53 kg/m²     Labs:   CBC:  6.4/10.4 (MCV 86)/317  CHEM:  138/3.6  103/24 16/0.97  Ca 9.5  LFTs: ALP 48 AST 16 ALT 12  TBili 0.4 Tprot 7.1  Alb 4.1   hsTrops: <3  bHCG: negative   Resp viruses: COVID , RSV , Influenza A , Influenza B - All negative     Imaging/EKG:  CTPE 5/1  Approximately 3.6 x 3.8 x 3.3 cm lobulated, partially calcified R Hilar mass most likely reflects malignancy. There is nodular septal thickening in the RML and RUL c/f lymphangitic carcinomatosis. RUL pulmonary artery does not opacify and may contain bland thrombus secondary to compression by the mass or tumor thrombus. R Hilar mass exerts significant mass effect on the SVC which is nearly occluded.    Interventions:  HI " Heparin gtt     Review of Systems  As stated in HPI    Past Medical History  Medical History[1]    Surgical History  Surgical History[2]     Family History  Family History[3]    Social History  Living Situation: Lives w/ children; mother is across street. Able to do ADLs, iADLs w/o any challenges. Has great family support.   Occupation:  at [employer]   Alcohol: Social   Tobacco: Denies use including Vapes   Illicit Drugs: Marijuana daily since 16. Denies any IVDU.     ECO    Allergies  Patient has no known allergies.    Home Medications  Current Discharge Medication List        CONTINUE these medications which have NOT CHANGED    Details   metoprolol succinate XL (Toprol-XL) 50 mg 24 hr tablet Take 1 tablet (50 mg) by mouth once daily. Do not crush or chew.  Qty: 90 tablet, Refills: 3    Associated Diagnoses: Essential (primary) hypertension      triamterene-hydrochlorothiazid (Maxzide-25) 37.5-25 mg tablet Take 1 tablet by mouth once daily.  Qty: 90 tablet, Refills: 3    Associated Diagnoses: Primary hypertension      blood pressure test kit-large kit 1 device used daily for blood pressure monitoring  Qty: 1 each, Refills: 0    Associated Diagnoses: Resistant hypertension      !! phentermine 37.5 mg capsule Take 1 capsule (37.5 mg) by mouth once daily in the morning. Take before meals.  Qty: 30 capsule, Refills: 0    Associated Diagnoses: Encounter for weight loss counseling      !! phentermine 37.5 mg capsule Take 1 capsule (37.5 mg) by mouth once daily in the morning. Take before meals. Do not fill before May 23, 2025.  Qty: 30 capsule, Refills: 0    Associated Diagnoses: Encounter for weight loss counseling      !! phentermine 37.5 mg capsule Take 1 capsule (37.5 mg) by mouth once daily in the morning. Take before meals. Do not fill before 2025.  Qty: 30 capsule, Refills: 0    Associated Diagnoses: Encounter for weight loss counseling       !! - Potential duplicate medications  "found. Please discuss with provider.          Objective   Vitals: I/O:   Vitals:    05/02/25 0014   BP: 113/78   Pulse: 62   Resp: 16   Temp: 36.8 °C (98.2 °F)   SpO2: 100%        24hr Min/Max:  Temp  Min: 36.7 °C (98 °F)  Max: 36.9 °C (98.4 °F)  Pulse  Min: 53  Max: 92  BP  Min: 104/70  Max: 136/94  Resp  Min: 8  Max: 22  SpO2  Min: 95 %  Max: 100 %   Intake/Output Summary (Last 24 hours) at 5/2/2025 0036  Last data filed at 5/1/2025 2157  Gross per 24 hour   Intake 12.13 ml   Output --   Net 12.13 ml        Net IO Since Admission: 12.13 mL [05/02/25 0036]      Physical Exam:  General: Awake, alert, conversant, appears stated age  HEENT: Pupils equal and round, no scleral icterus or conjunctivitis  Skin: No suspect lesions or rashes noted on visible skin  Chest: Ctab, normal respiratory effort, not on supplemental oxygen  Cardiac: Regular rate and rhythm, normal s1, s2, no M/R/G, no JVD  Abdomen: Soft, ND, NT, no involuntary guarding  : No flank pain or indwelling urinary catheter  EXT: No peripheral edema, no asymmetry noted  MSK: No focal joint swelling noted  Neuro: AOx4, moving all limbs spontaneously, follows commands  Psych: Coherent thought process, appropriate mood and affect    LABS:  CBC RFP   Lab Results   Component Value Date    WBC 6.4 05/01/2025    HGB 10.4 (L) 05/01/2025    HCT 31.3 (L) 05/01/2025    MCV 86 05/01/2025     05/01/2025    NEUTROABS 4.11 05/01/2025    Lab Results   Component Value Date     05/01/2025    K 3.6 05/01/2025     05/01/2025    CO2 24 05/01/2025    BUN 16 05/01/2025    CREATININE 0.97 05/01/2025    CREATININE 0.98 09/21/2024     Lab Results   Component Value Date    MG 1.90 12/31/2023    CALCIUM 9.5 05/01/2025         Hepatic Function ABG/VBG   Lab Results   Component Value Date    ALT 12 05/01/2025    AST 16 05/01/2025    ALKPHOS 48 05/01/2025     No results found for: \"TBILIHCVFS\", \"BILIDIR\"   Lab Results   Component Value Date    PROTIME 12.6 09/13/2019 " "   APTT 32 05/01/2025    INR 1.1 09/13/2019    No results found for: \"NONUHFIRE\", \"LACTATE\"     Results for orders placed or performed during the hospital encounter of 05/01/25 (from the past 24 hours)   Heparin Assay, UFH   Result Value Ref Range    Heparin Unfractionated 0.5 See Comment Below for Therapeutic Ranges IU/mL     Micro/culture data:  No results found for the last 90 days.    Imaging  CT angio chest for pulmonary embolism  Addendum Date: 5/1/2025  NOTIFICATION:  The results of the study were discussed with, and acknowledged by Dr. Trav Hernandez, by telephone on 5/1/2025 at 0553 hours. Signed by Ye Santos MD    Result Date: 5/1/2025  Approximately 3.6 x 3.8 x 3.3 cm lobulated, partially calcified right hilar mass most likely reflects malignancy.  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 The right upper lobe pulmonary artery does not opacify and may contain bland thrombus secondary to compression by the mass or tumor thrombus.  The remaining pulmonary arteries are patent. The right hilar mass mass exerts significant mass effect on the superior vena cava which is nearly occluded. Signed by Ye Santos MD    XR chest 1 view  Result Date: 5/1/2025  New 2 cm RLL nodule may represent neoplasm. Recommend correlation with CT chest. Signed by Kevin Teran MD      Medications:  Scheduled Medications:  PRN Medications:    Scheduled Medications[4] PRN Medications[5]    Continuous Medications[6]         Assessment/Plan:   Tatiana Prabhakar is a 34 y.o. female w/a PMH of HTN, MICHELLE (not on CPAP), Chronic Constipation and Obesity who is transferred from [facility] for malignancy work/up and treatment plan for 3.6 x 3.8 x 3.3 cm lobulated R hilar suspected to be malignant mass, of unknown primary, and seen to be severely compressing SVC. There is also R Pulm artery flow defects c/f thrombus/emboli w/o evidence of RH strain on CTPE. She has " records on chart review of a contrasted CT Chest from 3/2021 that identified calcified lymph nodes in the rightparatracheal/hilar region, likely related to granulomatous disease, that were enlarged compared to priors. There was also a comment of enlarging lymph nodes in the mid right paratracheal region (from 36 x 28 mm to 46 x 31mm) w/ increased compression of the SVC to a slit-like opening near the atrial caval junction. These findings thus appear chronic for her but progression vs new changes w/ lobulations seen may mean malignant process. No biopsies were reported. She remains HDS and was started on HI heparin gtt at OSH for R Pulm arterial findings. She is NPO for possible procedure in AM. Rest of malignancy w/up plan to be finalized in the AM.       #Chronic R Hilar Mass   #Chronic SVC syndrome   #R Pulm artery thrombus/embolus  ::Pt stated significant weight loss of ~50Ibs over 12 months but large intentional component. No significant smoking hx other than Marijuana. Denies any Vape pen use.   ::Stated some streaks of blood in stool but is chronic for her iso chronic constipation   ::Family hx of Ovarian Ca in mother   ::CTPE 5/1 - Approximately 3.6 x 3.8 x 3.3 cm lobulated, partially calcified R Hilar mass most likely reflects malignancy. There is nodular septal thickening in the RML and RUL c/f lymphangitic carcinomatosis. RUL pulmonary artery does not opacify and may contain bland thrombus secondary to compression by the mass or tumor thrombus. R Hilar mass exerts significant mass effect on the SVC which is nearly occluded.  ::Prior CT Chest w/ Cont 3/2022 summary as follows: calcified lymph nodes in the rightparatracheal/hilar region, likely related to granulomatous disease, that were enlarged compared to priors. Enlarging lymph nodes in the mid right paratracheal region (from 36 x 28 mm to 46 x 31mm) w/ increased compression of the SVC to a slit-like opening near the atrial caval junction.   ::Pt  currently HDS and no evidence of R sided facial/neck swelling   - C/w HI heparin gtt started at OSH   - Ordered b/l LE DVT US w/ Duplex   - Will need lung/mediastinal mass biopsy, pan scan for new malignancy  - NPO from [facility] 5/2 for possible procedures. Heparin gtt continued until plan for invasive procedure decided.   - Additional imaging, lab studies for CA w/up per Oncology team in AM  - May need Dex if worsening SVC compression from R Hilar mass   - Will CTM on telemetry, Cont pulse Ox     #Persistent Cough   #Chest tightness, resolved   ::No ACS concerns given nl EKG, trops   ::Reported recent Resp infx   ::Resp panel negative   ::Timurley d/2 current lung findings  - Ordered Dextromethorphan/guaifenesin 5ml q4h PRN    - Consider airway clearance techniques w/ RT consult     #HTN   #Hx of Orthostatic HoTN   ::Previoulsy on Amlodipine which was d/meg w/ start of complaint of positional HoTN abt 1 yr ago.   - C/w home Metop Succ 50mg daily. Hold for SBP <120, HR <60.  - C/w home Triamterene-HcTz 37.5-25 daily. Hold for SBP <120, HR <60.    #Chronic Constipation   ::Since childhood. Has required enemas in hospital and at home.   - C/w Miralax, Doc-Senna daily  - CTM BM     #Obesity   #MICHELLE   ::On Phentermine at home for weightloss  ::Last sleep study in 2019 and repeat ordered and currently pending   ::Not on CPAP at home   - Will hold Phentermine whilst inpt     #Normocytic Anemia    #Mild blood in stool   ::Bl Hgb ~12. Hgb on intake at OSH 10.4 MCV 86. RDW wnl. BUN low so likely lower GI bleed.   ::Streaky blood in stool likely from diverticula given significant hx of constipation  - CTM CBCs  - May need Cscope   - Consider Iron studies     #Anxiety  #Insomnia   ::Pt appeared anxious from new findings and mother asked if she can get meds to help calm her and for sleep   - Consider Atarax 10mg PRN   - C/w Melatonin 5mg PRN at bedtime       F: PRN  E: Replete PRN for K>3.5, Mg>1.8  N: Regular Diet, NPO at [facility] for  possible procedure   Access/Lines: PIV     DVT Ppx: HI Heparin gtt  GI Ppx: None     Abx: None   O2: RA    Pain regimen: None   GI Laxative: Docusate/Senna / Miralax     Code status: Full Code  NOK/Surrogate Decision Maker: Yi Neal (Mother) [phone]     The Assessment and Plan will be discussed w/ Attending on Service, Dr. Coughlin, in the AM.     Nitin Jones MD  PGY-2, Internal Medicine          [1]   Past Medical History:  Diagnosis Date    Blepharochalasis left eye, unspecified eyelid 11/19/2018    Blepharochalasis of left eye    Blepharochalasis left upper eyelid 04/07/2017    Blepharochalasis left upper eyelid    Body mass index (BMI) 38.0-38.9, adult 12/29/2016    BMI 38.0-38.9,adult    Chronic sinusitis, unspecified 12/18/2017    Other sinusitis    Edema of left upper eyelid 04/07/2017    Edema of left upper eyelid    Encounter for gynecological examination (general) (routine) without abnormal findings 05/18/2017    Encounter for annual routine gynecological examination    Encounter for routine postpartum follow-up 02/07/2017    Postpartum care following vaginal delivery    Essential (primary) hypertension 02/07/2017    Benign essential hypertension    Hypertension     Impacted cerumen, left ear 10/13/2017    Impacted cerumen of left ear    Localized edema 10/30/2014    Periorbital edema    Low back pain, unspecified 04/18/2018    Dorsalgia of lumbar region    Obstructive sleep apnea (adult) (pediatric) 10/01/2019    Obstructive sleep apnea of adult    Obstructive sleep apnea (adult) (pediatric) 12/04/2014    Obstructive sleep apnea    Other conditions influencing health status     Excessive mucus secretion    Other conditions influencing health status     History of recent childbirth    Personal history of other diseases of the circulatory system 10/28/2015    History of cardiac murmur    Personal history of other diseases of the nervous system and sense organs 04/07/2017    History of  papilledema    Personal history of other diseases of the nervous system and sense organs 09/23/2015    History of papilledema    Personal history of other diseases of the respiratory system 06/02/2020    History of allergic rhinitis    Personal history of other endocrine, nutritional and metabolic disease 10/30/2015    History of hypokalemia    Personal history of other specified conditions 08/01/2016    History of headache    Personal history of other specified conditions 04/07/2017    History of headache    Personal history of other specified conditions 05/18/2017    History of abdominal pain    Polyp of cervix uteri 03/17/2016    Cervical polyp    Pure hypercholesterolemia, unspecified 02/20/2015    Low-density-lipoid-type (LDL) hyperlipoproteinemia    Scotoma of blind spot area, left eye 04/07/2017    Enlarged blind spot of left eye    Sleep apnea     Supervision of pregnancy with other poor reproductive or obstetric history, first trimester ([facility]) 12/29/2016    History of pre-eclampsia in prior pregnancy, currently pregnant, first trimester    Unspecified acute lower respiratory infection 12/18/2017    Lower respiratory infection (e.g., bronchitis, pneumonia, pneumonitis, pulmonitis)    Unspecified asthma, uncomplicated ([facility]) 06/02/2020    Asthmatic bronchitis    Unspecified visual disturbance 04/07/2017    Transient vision disturbance   [2]   Past Surgical History:  Procedure Laterality Date    OTHER SURGICAL HISTORY  02/07/2017    Tubal Ligation After Vaginal Delivery (Same Hospitalization)   [3]   Family History  Problem Relation Name Age of Onset    Ovarian cancer Mother      Diabetes Father      Hypertension Father      Diabetes Other grandparent     Stroke Other grandparent     Breast cancer Neg Hx      Colon cancer Neg Hx     [4] metoprolol succinate XL, 50 mg, oral, Daily  polyethylene glycol, 17 g, oral, Daily  sennosides-docusate sodium, 1 tablet, oral, Nightly  triamterene-hydrochlorothiazid,  1 tablet, oral, Daily     [5] PRN medications: dextromethorphan-guaifenesin, heparin, melatonin  [6] heparin, 0-4,500 Units/hr, Last Rate: 1,400 Units/hr (05/01/25 2664)

## 2025-05-02 NOTE — PROGRESS NOTES
Tatiana Prabhakar is a 34 y.o. female on day 1 of admission presenting with chest tightness, SOB, and productive cough with CT PE significant for lobulated, partly calcified right hilar mass compressing SVC, positive family history for sarcoidosis, with current differential favoring fibrosing mediastinitis +/- sarcoidosis over malignancy or chronic histoplasmosis, also with possible RUL pulmonary artery thrombus/embolus with pending DVT US. Additional PMHx of HTN, MICHELLE (not on CPAP), chronic constipation, and obesity.    Subjective   NAEON. Patient endorses productive cough but no chest tightness or shortness of breath. Patient notes occasional red blood in her stool and on toilet paper but this is limited to bowel movements for which she has to strain.     Objective   Vitals  Last Recorded Vitals  /77 (BP Location: Left arm, Patient Position: Lying)   Pulse 90   Temp 36.7 °C (98.1 °F) (Temporal)   Resp 18   Wt 90.7 kg (200 lb)   SpO2 98%     I/Os  Intake/Output last 3 Shifts:    Intake/Output Summary (Last 24 hours) at 5/2/2025 1116  Last data filed at 5/1/2025 2157  Gross per 24 hour   Intake 12.13 ml   Output --   Net 12.13 ml     Admission Weight  Weight: 90.7 kg (200 lb) (05/01/25 1954)    Daily Weight  05/01/25 : 90.7 kg (200 lb)    Lab Trends  Results from last 72 hours   Lab Units 05/02/25  0835 05/01/25  0351   WBC AUTO x10*3/uL 5.1 6.4   HEMOGLOBIN g/dL 12.0 10.4*   PLATELETS AUTO x10*3/uL 341 317   SODIUM mmol/L 138 138   POTASSIUM mmol/L 3.8 3.6   CO2 mmol/L 24 24   BUN mg/dL 10 16   MAGNESIUM mg/dL 2.04  --    GLUCOSE mg/dL 85 111*      Current Labs  Results for orders placed or performed during the hospital encounter of 05/01/25 (from the past 24 hours)   Heparin Assay, UFH   Result Value Ref Range    Heparin Unfractionated 0.5 See Comment Below for Therapeutic Ranges IU/mL   CBC and Auto Differential   Result Value Ref Range    WBC 5.1 4.4 - 11.3 x10*3/uL    nRBC 0.0 0.0 - 0.0 /100 WBCs    RBC  4.35 4.00 - 5.20 x10*6/uL    Hemoglobin 12.0 12.0 - 16.0 g/dL    Hematocrit 37.5 36.0 - 46.0 %    MCV 86 80 - 100 fL    MCH 27.6 26.0 - 34.0 pg    MCHC 32.0 32.0 - 36.0 g/dL    RDW 13.0 11.5 - 14.5 %    Platelets 341 150 - 450 x10*3/uL    Neutrophils % 59.0 40.0 - 80.0 %    Immature Granulocytes %, Automated 0.4 0.0 - 0.9 %    Lymphocytes % 33.5 13.0 - 44.0 %    Monocytes % 4.7 2.0 - 10.0 %    Eosinophils % 1.8 0.0 - 6.0 %    Basophils % 0.6 0.0 - 2.0 %    Neutrophils Absolute 3.03 1.20 - 7.70 x10*3/uL    Immature Granulocytes Absolute, Automated 0.02 0.00 - 0.70 x10*3/uL    Lymphocytes Absolute 1.72 1.20 - 4.80 x10*3/uL    Monocytes Absolute 0.24 0.10 - 1.00 x10*3/uL    Eosinophils Absolute 0.09 0.00 - 0.70 x10*3/uL    Basophils Absolute 0.03 0.00 - 0.10 x10*3/uL   Renal function panel   Result Value Ref Range    Glucose 85 74 - 99 mg/dL    Sodium 138 136 - 145 mmol/L    Potassium 3.8 3.5 - 5.3 mmol/L    Chloride 102 98 - 107 mmol/L    Bicarbonate 24 21 - 32 mmol/L    Anion Gap 16 10 - 20 mmol/L    Urea Nitrogen 10 6 - 23 mg/dL    Creatinine 1.06 (H) 0.50 - 1.05 mg/dL    eGFR 70 >60 mL/min/1.73m*2    Calcium 9.7 8.6 - 10.6 mg/dL    Phosphorus 3.1 2.5 - 4.9 mg/dL    Albumin 4.2 3.4 - 5.0 g/dL   Magnesium   Result Value Ref Range    Magnesium 2.04 1.60 - 2.40 mg/dL   Heparin Assay, UFH   Result Value Ref Range    Heparin Unfractionated 0.5 See Comment Below for Therapeutic Ranges IU/mL   Lactate dehydrogenase   Result Value Ref Range     84 - 246 U/L   Uric Acid   Result Value Ref Range    Uric Acid 6.0 2.3 - 6.7 mg/dL   Lower extremity venous duplex bilateral   Result Value Ref Range    BSA 2.1 m2   Transthoracic Echo Complete   Result Value Ref Range    AV pk uriel 1.24 m/s    LVOT diam 2.20 cm    MV E/A ratio 1.22     LA vol index A/L 19.4 ml/m2    Tricuspid annular plane systolic excursion 2.3 cm    LV EF 68 %    RV free wall pk S' 10.00 cm/s    LVIDd 4.30 cm    Aortic Valve Area by Continuity of Peak  Velocity 3.10 cm2    AV pk grad 6 mmHg    LV A4C EF 73.0      Imaging  CT angio chest for pulmonary embolism  Addendum Date: 5/1/2025  NOTIFICATION:  The results of the study were discussed with, and acknowledged by Dr. Trav Hernandez, by telephone on 5/1/2025 at 0553 hours. Signed by Ye Santos MD    Result Date: 5/1/2025  Approximately 3.6 x 3.8 x 3.3 cm lobulated, partially calcified right hilar mass most likely reflects malignancy.  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 The right upper lobe pulmonary artery does not opacify and may contain bland thrombus secondary to compression by the mass or tumor thrombus.  The remaining pulmonary arteries are patent. The right hilar mass mass exerts significant mass effect on the superior vena cava which is nearly occluded. Signed by Ye Santos MD    XR chest 1 view  Result Date: 5/1/2025  New 2 cm RLL nodule may represent neoplasm. Recommend correlation with CT chest. Signed by Kevin Teran MD    ECG 12 lead  Normal sinus rhythm  Normal ECG  When compared with ECG of 31-DEC-2023 16:11,  Vent. rate has decreased BY  47 BPM  See ED provider note for full interpretation and clinical correlation  Confirmed by Lily Palomo ([id number]) on 5/2/2025 9:52:54 AM    Physical Exam  Constitutional:       General: She is not in acute distress.     Appearance: Normal appearance. She is obese. She is not ill-appearing or diaphoretic.   HENT:      Head: Normocephalic and atraumatic.      Nose: No congestion or rhinorrhea.   Eyes:      General: No scleral icterus.     Extraocular Movements: Extraocular movements intact.      Conjunctiva/sclera: Conjunctivae normal.      Pupils: Pupils are equal, round, and reactive to light.   Cardiovascular:      Rate and Rhythm: Normal rate and regular rhythm.      Heart sounds: No murmur heard.     No friction rub. No gallop.   Pulmonary:      Effort: Pulmonary effort is  normal. No respiratory distress.      Breath sounds: No wheezing, rhonchi or rales.      Comments: Diminished breath sounds in R middle and superior lobes.  Chest:      Chest wall: No tenderness.   Abdominal:      General: Abdomen is flat. Bowel sounds are normal. There is no distension.      Palpations: Abdomen is soft. There is no mass.      Tenderness: There is no abdominal tenderness. There is no guarding or rebound.   Musculoskeletal:         General: No swelling, tenderness, deformity or signs of injury.      Cervical back: Neck supple. No tenderness.      Right lower leg: No edema.      Left lower leg: No edema.   Skin:     Capillary Refill: Capillary refill takes less than 2 seconds.      Coloration: Skin is not jaundiced or pale.      Findings: No bruising, erythema, lesion or rash.      Comments: Bruise/discoloration on R lower shin 2/2 trauma.   Neurological:      General: No focal deficit present.      Mental Status: She is alert and oriented to person, place, and time.      Motor: No weakness.      Gait: Gait normal.   Psychiatric:         Mood and Affect: Mood normal.         Behavior: Behavior normal.         Thought Content: Thought content normal.         Judgment: Judgment normal.       Medications  Scheduled medications  [Held by provider] metoprolol succinate XL, 50 mg, oral, Daily  polyethylene glycol, 17 g, oral, Daily  sennosides-docusate sodium, 1 tablet, oral, Nightly  [Held by provider] triamterene-hydrochlorothiazid, 1 tablet, oral, Daily      Continuous medications  heparin, 0-4,500 Units/hr, Last Rate: 1,400 Units/hr (05/02/25 2662)      PRN medications  PRN medications: dextromethorphan-guaifenesin, heparin, melatonin      Assessment/Plan   Tatiana Prabhakar is a 34 y.o. 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 Chronic histoplasmosis also a consideration with remote history of positive urine antigen and at that time her provider did not recommend any treatment. The hilar mass has demonstrated compression of her SVC since 2021 although patient is currently asymptomatic beyond coughing. Historically, patient does note episodes of dizziness and shortness of breath, as on admission, consistent with SVC compression but denies changes in size/swelling of her face, neck, and upper extremities. CT revealed lack of opacification of RUL pulmonary artery with c/f thrombus/embolus but DVT US negative for thrombus.     #Chronic R Hilar Mass  #Fibrosing mediastinitis +/- sarcoidosis  - Pt states significant weight loss of ~50Ibs over 12 months but large intentional component.  - No significant smoking hx other than Marijuana. Denies any Vape pen use  - Family Hx of sarcoidosis in mom (lung and brain) who was diagnosed 1-2 years ago with symptom onset 6 years ago of dizziness and headaches. Her mom is currently on methotrexate and gets ?? infusions  - CT A/P significant for sequela of prior granulomatous disease in the liver and spleen  PLAN:  * Pulm recs:  - IR for SVC stenting  - Pre-labs sent to [facility] for rituximab therapy    #Opacification of RUL pulmonary artery  - US w/ Duplex negative for DVT  - Echo unremarkable  PLAN:  * D/c heparin with low concern for thrombus per pulm  * Lovenox for DVT prophylaxis  * Can consider d/c of telemetry, continuous pulse ox    #Persistent cough  #Chest tightness, resolved  #Chronic SVC syndrome   - Currently asymptomatic except for coughing, managed by robitussin  - No ACS concerns given nl EKG, trops  - Hx of recent respiratory infection with respiratory panel negative  PLAN:  * Can consider dexamethasone if current symptoms progress  * Can consider airway clearance with RT    #HTN   #Hx of  Orthostatic HoTN   - Previously on Amlodipine which was d/meg w/ start of complaint of positional HoTN abt 1 yr ago  PLAN:  * Hold metop succ 50 mg daily. Hold for SBP <120, HR <60  * Hold triamterene-HCTZ 37.5-25 daily. Hold for SBP <120, HR <60  * Monitor pressures    #Chronic Constipation  #Intermittent red blood with BM  - Since childhood. Has required enemas in hospital and at home  - Associated with straining, likely etiology anal fissure vs hemorrhoid, less likely diverticula  - Drop in Hgb at OSH to 10.4 but now back to baseline at 12.0  PLAN:  * C/w Miralax, Doc-Senna daily  * Assess BM for blood  * Daily CBCs  * Hold off on repeat colonoscopy at this time    #Obesity   #MICHELLE   - On phentermine at home for weight loss  - Last sleep study in 2019 and repeat ordered and currently pending  - Not on CPAP at home  PLAN:  * Will hold Phentermine whilst inpt     #Anxiety  #Insomnia   - Pt has anxiety about the prognosis of her mass  PLAN:  * C/w Melatonin 5mg PRN at bedtime  * Consider Atarax 10mg PRN    Dispo: To home, pending SVC stenting by IR.    F: PRN  E: Replete PRN for K>3.5, Mg>1.8  N: Regular diet  A: PIV x 2  O2: none  Abx: none  GI: Miralax, docusate, sennosides  DVT: Lovenox     Code: Full code  SDM: Yi Neal (Mother, [phone])     KENNY NYE, MS4

## 2025-05-02 NOTE — PROGRESS NOTES
05/02/25 1500   Discharge Planning   Living Arrangements Children   Support Systems Children;Parent;Organized support group (Comment)   Assistance Needed None   Type of Residence Private residence   Number of Stairs to Enter Residence 3   Number of Stairs Within Residence 12   Do you have animals or pets at home? No   Who is requesting discharge planning? Provider   Home or Post Acute Services None   Expected Discharge Disposition Home   Does the patient need discharge transport arranged? No   RoundTrip coordination needed? No   Financial Resource Strain   How hard is it for you to pay for the very basics like food, housing, medical care, and heating? Not very   Housing Stability   In the last 12 months, was there a time when you were not able to pay the mortgage or rent on time? N   In the past 12 months, how many times have you moved where you were living? 1   At any time in the past 12 months, were you homeless or living in a shelter (including now)? N   Transportation Needs   In the past 12 months, has lack of transportation kept you from medical appointments or from getting medications? no   In the past 12 months, has lack of transportation kept you from meetings, work, or from getting things needed for daily living? No     Tatiana Prabhakar is a 34 y.o. female on day 1 of admission presenting with chest tightness, SOB, and productive cough with CT PE significant for lobulated, partly calcified right hilar mass compressing SVC, positive family history for sarcoidosis, with current differential favoring sarcoidosis over malignancy or chronic histoplasmosis, also with possible RUL pulmonary artery thrombus/embolus with pending DVT US. With additional PMHx of HTN, MICHELLE (not on CPAP), chronic constipation, and obesity. ADOD/needs TBD    TCC Note: Met with patient and introduced myself as care coordinator and member of the care transitions team for discharge planning. Patient lives with her 4 children ages 8-16 yo.  Patient is independent, does not use assistive devices. Denies falls. Mother is primary support. Demographics and contacts verified. [facility] is AOC if needed. TCC will continue to follow for discharge needs. Lily Francisco RN                                                 Home medical equipment: None  DME: None  O2: No  Dialysis: No  Diabetes/Supplies needed: N/A    Home Care: [facility]  PCP: Lavell Garcia MD  Pharm: [facility] [address] [place]  Barriers: none  Discharge Disposition: Home  ADOD: TBD  Transport at Discharge: Mother/son

## 2025-05-02 NOTE — CONSULTS
Reason For Consult  Lung Mass     History Of Present Illness  34yoF admitted for lung mass.     PMHx  Hypertension  Obstructive Sleep Apnea: CPAP, non-compliant  Fibrosing mediastinitis iso histoplasmosis  Constipation, chronic  Obesity/Overweight     In brief, patient with known fibrosing mediastinitis iso histoplasmosis admitted for progressive dyspnea, dry cough, and mild facial swelling found to have fibrosing mediastinitis c/b SVC syndrome requiring vascular stent and initiation of monoclonal antibody. Notably, FMHx of sarcoidosis requiring immune modulating therapy [mother]. Otherwise, no further symptoms reported.      Past pertinent pulmonary medicine encounter/s:   02/17/2021: Index Consultation. Mediastinal LAD likely reflective of histoplasmosis consistent with fibrosing mediastinitis with compression of SVC and narrowing of RUL bronchus. Radiographic monitoring. No therapeutic intervention   12/15/2021: Fibrosing mediastinitis. Progression. Radiographic monitoring. No therapeutic intervention.   09/27/2022: Fibrosing mediastinitis. Fibrosing mediastinitis. Progression. Radiographic monitoring. No therapeutic intervention.       Past Medical History  She has a past medical history of Blepharochalasis left eye, unspecified eyelid (11/19/2018), Blepharochalasis left upper eyelid (04/07/2017), Body mass index (BMI) 38.0-38.9, adult (12/29/2016), Chronic sinusitis, unspecified (12/18/2017), Edema of left upper eyelid (04/07/2017), Encounter for gynecological examination (general) (routine) without abnormal findings (05/18/2017), Encounter for routine postpartum follow-up (02/07/2017), Essential (primary) hypertension (02/07/2017), Hypertension, Impacted cerumen, left ear (10/13/2017), Localized edema (10/30/2014), Low back pain, unspecified (04/18/2018), Obstructive sleep apnea (adult) (pediatric) (10/01/2019), Obstructive sleep apnea (adult) (pediatric) (12/04/2014), Other conditions influencing health  "status, Other conditions influencing health status, Personal history of other diseases of the circulatory system (10/28/2015), Personal history of other diseases of the nervous system and sense organs (04/07/2017), Personal history of other diseases of the nervous system and sense organs (09/23/2015), Personal history of other diseases of the respiratory system (06/02/2020), Personal history of other endocrine, nutritional and metabolic disease (10/30/2015), Personal history of other specified conditions (08/01/2016), Personal history of other specified conditions (04/07/2017), Personal history of other specified conditions (05/18/2017), Polyp of cervix uteri (03/17/2016), Pure hypercholesterolemia, unspecified (02/20/2015), Scotoma of blind spot area, left eye (04/07/2017), Sleep apnea, Supervision of pregnancy with other poor reproductive or obstetric history, first trimester ([facility]) (12/29/2016), Unspecified acute lower respiratory infection (12/18/2017), Unspecified asthma, uncomplicated ([facility]) (06/02/2020), and Unspecified visual disturbance (04/07/2017).    Surgical History  She has a past surgical history that includes Other surgical history (02/07/2017).     Social History  She reports that she has never smoked. She has never been exposed to tobacco smoke. She has never used smokeless tobacco. She reports current alcohol use. She reports that she does not use drugs.    Family History  Family History[1]     Allergies  Patient has no known allergies.    Physical Exam  AAOx3   No acute distress  No respiratory distress  No respiratory failure  Diminished breath sounds   RRR, no murmur   Soft, non-tender, non-distended abdomen   No LE edema      Last Recorded Vitals  Blood pressure 113/78, pulse 76, temperature 36.7 °C (98 °F), temperature source Temporal, resp. rate 16, height 1.753 m (5' 9\"), weight 90.7 kg (200 lb), SpO2 98%.    Relevant Results  05/01/25: CT Chest PE protocol reviewed   05/02/25: " Transthoracic ECHO reviewed   Additional pertinent labs/imaging/procedural data reviewed      Assessment/Plan   34yoF admitted for lung mass.     PMHx  Hypertension  Obstructive Sleep Apnea: CPAP, non-compliant  Fibrosing mediastinitis iso histoplasmosis  Constipation, chronic  Obesity/Overweight     In brief, patient with known fibrosing mediastinitis iso histoplasmosis admitted for progressive dyspnea, dry cough, and mild facial swelling found to have fibrosing mediastinitis c/b SVC syndrome requiring vascular stent and initiation of monoclonal antibody. Notably, FMHx of sarcoidosis requiring immune modulating therapy [mother]. Otherwise, no further symptoms reported.       At this juncture, patient with fibrosing mediastinitis confirmed radiologically with known complication of SVC syndrome. Differentials considered [sarcoidosis - albeit does not correspond radiologically in addition to TB/CTD/Malignancy]. Risk for malignancy low based on clinici-radiologic features [Age, smooth lobulated mass with popcorn calcifications without vascular convergence/pleural retraction/cavitation and septal thickening  in upper-mid zone, centrally location likely reflective of pulmonary veinous congestion iso of extrinsic compression of pulmonary veins]    Plan  -Appreciate interventional radiology: endovascular intervention   -Decline Bronchoscopic tissue sampling given confirmatory clinical-radiologic presentation of fibrosing mediastinitis   -Obtain pre-monoclonal evaluation [Hepatitis Panel - B/C, HIV, Quantiferron Testing]   -Following testing and acute intervention, will pursue, if able, Rituximab therapy.     Thank you for involving pulmonary medicine in the care of Ms. Prabhakar.   Pulmonary medicine to continue to follow   Case discussed with Dr. Katie Fisher MD         [1]   Family History  Problem Relation Name Age of Onset    Ovarian cancer Mother      Diabetes Father      Hypertension Father      Diabetes Other  grandparent     Stroke Other grandparent     Breast cancer Neg Hx      Colon cancer Neg Hx

## 2025-05-03 LAB
ABO GROUP (TYPE) IN BLOOD: NORMAL
ALBUMIN SERPL BCP-MCNC: 3.9 G/DL (ref 3.4–5)
ANION GAP SERPL CALC-SCNC: 14 MMOL/L (ref 10–20)
ANTIBODY SCREEN: NORMAL
APTT PPP: 33 SECONDS (ref 26–36)
BASOPHILS # BLD AUTO: 0.04 X10*3/UL (ref 0–0.1)
BASOPHILS NFR BLD AUTO: 0.9 %
BUN SERPL-MCNC: 10 MG/DL (ref 6–23)
CALCIUM SERPL-MCNC: 9.2 MG/DL (ref 8.6–10.6)
CHLORIDE SERPL-SCNC: 103 MMOL/L (ref 98–107)
CO2 SERPL-SCNC: 24 MMOL/L (ref 21–32)
CREAT SERPL-MCNC: 1.03 MG/DL (ref 0.5–1.05)
EGFRCR SERPLBLD CKD-EPI 2021: 72 ML/MIN/1.73M*2
EOSINOPHIL # BLD AUTO: 0.1 X10*3/UL (ref 0–0.7)
EOSINOPHIL NFR BLD AUTO: 2.2 %
ERYTHROCYTE [DISTWIDTH] IN BLOOD BY AUTOMATED COUNT: 12.7 % (ref 11.5–14.5)
GLUCOSE SERPL-MCNC: 95 MG/DL (ref 74–99)
HBV SURFACE AB SER-ACNC: 27 MIU/ML
HBV SURFACE AG SERPL QL IA: NONREACTIVE
HCT VFR BLD AUTO: 35.6 % (ref 36–46)
HCV AB SER QL: NONREACTIVE
HGB BLD-MCNC: 12 G/DL (ref 12–16)
HIV 1+2 AB+HIV1 P24 AG SERPL QL IA: NONREACTIVE
IMM GRANULOCYTES # BLD AUTO: 0.02 X10*3/UL (ref 0–0.7)
IMM GRANULOCYTES NFR BLD AUTO: 0.4 % (ref 0–0.9)
INR PPP: 1.1 (ref 0.9–1.1)
LYMPHOCYTES # BLD AUTO: 1.44 X10*3/UL (ref 1.2–4.8)
LYMPHOCYTES NFR BLD AUTO: 31.6 %
MAGNESIUM SERPL-MCNC: 1.97 MG/DL (ref 1.6–2.4)
MCH RBC QN AUTO: 28.1 PG (ref 26–34)
MCHC RBC AUTO-ENTMCNC: 33.7 G/DL (ref 32–36)
MCV RBC AUTO: 83 FL (ref 80–100)
MONOCYTES # BLD AUTO: 0.19 X10*3/UL (ref 0.1–1)
MONOCYTES NFR BLD AUTO: 4.2 %
NEUTROPHILS # BLD AUTO: 2.76 X10*3/UL (ref 1.2–7.7)
NEUTROPHILS NFR BLD AUTO: 60.7 %
NRBC BLD-RTO: 0 /100 WBCS (ref 0–0)
PHOSPHATE SERPL-MCNC: 4.5 MG/DL (ref 2.5–4.9)
PLATELET # BLD AUTO: 342 X10*3/UL (ref 150–450)
POTASSIUM SERPL-SCNC: 3.4 MMOL/L (ref 3.5–5.3)
PROTHROMBIN TIME: 11.9 SECONDS (ref 9.8–12.4)
RBC # BLD AUTO: 4.27 X10*6/UL (ref 4–5.2)
RH FACTOR (ANTIGEN D): NORMAL
SODIUM SERPL-SCNC: 138 MMOL/L (ref 136–145)
WBC # BLD AUTO: 4.6 X10*3/UL (ref 4.4–11.3)

## 2025-05-03 PROCEDURE — 86803 HEPATITIS C AB TEST: CPT

## 2025-05-03 PROCEDURE — 87389 HIV-1 AG W/HIV-1&-2 AB AG IA: CPT

## 2025-05-03 PROCEDURE — 36415 COLL VENOUS BLD VENIPUNCTURE: CPT

## 2025-05-03 PROCEDURE — 86706 HEP B SURFACE ANTIBODY: CPT

## 2025-05-03 PROCEDURE — 82164 ANGIOTENSIN I ENZYME TEST: CPT

## 2025-05-03 PROCEDURE — 87340 HEPATITIS B SURFACE AG IA: CPT

## 2025-05-03 PROCEDURE — 99233 SBSQ HOSP IP/OBS HIGH 50: CPT

## 2025-05-03 PROCEDURE — 80069 RENAL FUNCTION PANEL: CPT

## 2025-05-03 PROCEDURE — 83735 ASSAY OF MAGNESIUM: CPT

## 2025-05-03 PROCEDURE — 2500000001 HC RX 250 WO HCPCS SELF ADMINISTERED DRUGS (ALT 637 FOR MEDICARE OP): Mod: SE

## 2025-05-03 PROCEDURE — 86901 BLOOD TYPING SEROLOGIC RH(D): CPT

## 2025-05-03 PROCEDURE — 86850 RBC ANTIBODY SCREEN: CPT

## 2025-05-03 PROCEDURE — 2500000002 HC RX 250 W HCPCS SELF ADMINISTERED DRUGS (ALT 637 FOR MEDICARE OP, ALT 636 FOR OP/ED): Mod: SE

## 2025-05-03 PROCEDURE — 85610 PROTHROMBIN TIME: CPT

## 2025-05-03 PROCEDURE — 86481 TB AG RESPONSE T-CELL SUSP: CPT

## 2025-05-03 PROCEDURE — 85730 THROMBOPLASTIN TIME PARTIAL: CPT

## 2025-05-03 PROCEDURE — 2500000004 HC RX 250 GENERAL PHARMACY W/ HCPCS (ALT 636 FOR OP/ED): Mod: JZ,SE

## 2025-05-03 PROCEDURE — 85025 COMPLETE CBC W/AUTO DIFF WBC: CPT

## 2025-05-03 PROCEDURE — 1170000001 HC PRIVATE ONCOLOGY ROOM DAILY

## 2025-05-03 RX ORDER — POTASSIUM CHLORIDE 20 MEQ/1
20 TABLET, EXTENDED RELEASE ORAL ONCE
Status: COMPLETED | OUTPATIENT
Start: 2025-05-03 | End: 2025-05-03

## 2025-05-03 RX ADMIN — GUAIFENESIN AND DEXTROMETHORPHAN 5 ML: 100; 10 SYRUP ORAL at 21:48

## 2025-05-03 RX ADMIN — ENOXAPARIN SODIUM 40 MG: 100 INJECTION SUBCUTANEOUS at 08:21

## 2025-05-03 RX ADMIN — POTASSIUM CHLORIDE 20 MEQ: 1500 TABLET, EXTENDED RELEASE ORAL at 13:03

## 2025-05-03 ASSESSMENT — COGNITIVE AND FUNCTIONAL STATUS - GENERAL
DAILY ACTIVITIY SCORE: 24
MOBILITY SCORE: 24

## 2025-05-03 ASSESSMENT — PAIN SCALES - GENERAL: PAINLEVEL_OUTOF10: 0 - NO PAIN

## 2025-05-03 NOTE — SIGNIFICANT EVENT
Reviewed case with attending physician Dr. Sheehan.  Patient's presentation is consistent with fibrosing mediastinitis, likely in the setting of remote histoplasmosis infection.  Her CT does not show evidence of active disease, and therefore we do not feel she would benefit from the addition of rituximab at this time.  Would recommend close outpatient pulmonary followup (within four weeks of discharge).

## 2025-05-03 NOTE — PROGRESS NOTES
Tatiana Prabhakar is a 34 y.o. 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 Additional PMHx of HTN, MICHELLE (not on CPAP), chronic constipation, and obesity.    Subjective   NAEON.  No new concerns.      Objective   Vitals  Last Recorded Vitals  /84 (BP Location: Right arm, Patient Position: Sitting)   Pulse 96   Temp 36.2 °C (97.2 °F) (Temporal)   Resp 18   Wt 90.7 kg (200 lb)   SpO2 96%     I/Os  Intake/Output last 3 Shifts:  No intake or output data in the 24 hours ending 05/03/25 1236    Admission Weight  Weight: 90.7 kg (200 lb) (05/01/25 1954)    Daily Weight  05/01/25 : 90.7 kg (200 lb)    Lab Trends  Results from last 72 hours   Lab Units 05/03/25  0800 05/02/25  0835 05/01/25  0351   WBC AUTO x10*3/uL 4.6 5.1 6.4   HEMOGLOBIN g/dL 12.0 12.0 10.4*   PLATELETS AUTO x10*3/uL 342 341 317   SODIUM mmol/L 138 138 138   POTASSIUM mmol/L 3.4* 3.8 3.6   CO2 mmol/L 24 24 24   BUN mg/dL 10 10 16   MAGNESIUM mg/dL 1.97 2.04  --    GLUCOSE mg/dL 95 85 111*      Current Labs  Results for orders placed or performed during the hospital encounter of 05/01/25 (from the past 24 hours)   Protime-INR   Result Value Ref Range    Protime 13.3 (H) 9.8 - 12.4 seconds    INR 1.2 (H) 0.9 - 1.1   Coagulation Screen   Result Value Ref Range    Protime 11.9 9.8 - 12.4 seconds    INR 1.1 0.9 - 1.1    aPTT 33 26 - 36 seconds   CBC and Auto Differential   Result Value Ref Range    WBC 4.6 4.4 - 11.3 x10*3/uL    nRBC 0.0 0.0 - 0.0 /100 WBCs    RBC 4.27 4.00 - 5.20 x10*6/uL    Hemoglobin 12.0 12.0 - 16.0 g/dL    Hematocrit 35.6 (L) 36.0 - 46.0 %    MCV 83 80 - 100 fL    MCH  28.1 26.0 - 34.0 pg    MCHC 33.7 32.0 - 36.0 g/dL    RDW 12.7 11.5 - 14.5 %    Platelets 342 150 - 450 x10*3/uL    Neutrophils % 60.7 40.0 - 80.0 %    Immature Granulocytes %, Automated 0.4 0.0 - 0.9 %    Lymphocytes % 31.6 13.0 - 44.0 %    Monocytes % 4.2 2.0 - 10.0 %    Eosinophils % 2.2 0.0 - 6.0 %    Basophils % 0.9 0.0 - 2.0 %    Neutrophils Absolute 2.76 1.20 - 7.70 x10*3/uL    Immature Granulocytes Absolute, Automated 0.02 0.00 - 0.70 x10*3/uL    Lymphocytes Absolute 1.44 1.20 - 4.80 x10*3/uL    Monocytes Absolute 0.19 0.10 - 1.00 x10*3/uL    Eosinophils Absolute 0.10 0.00 - 0.70 x10*3/uL    Basophils Absolute 0.04 0.00 - 0.10 x10*3/uL   Renal function panel   Result Value Ref Range    Glucose 95 74 - 99 mg/dL    Sodium 138 136 - 145 mmol/L    Potassium 3.4 (L) 3.5 - 5.3 mmol/L    Chloride 103 98 - 107 mmol/L    Bicarbonate 24 21 - 32 mmol/L    Anion Gap 14 10 - 20 mmol/L    Urea Nitrogen 10 6 - 23 mg/dL    Creatinine 1.03 0.50 - 1.05 mg/dL    eGFR 72 >60 mL/min/1.73m*2    Calcium 9.2 8.6 - 10.6 mg/dL    Phosphorus 4.5 2.5 - 4.9 mg/dL    Albumin 3.9 3.4 - 5.0 g/dL   Magnesium   Result Value Ref Range    Magnesium 1.97 1.60 - 2.40 mg/dL   Type And Screen   Result Value Ref Range    ABO TYPE B     Rh TYPE POS     ANTIBODY SCREEN NEG    HIV 1/2 Antigen/Antibody Screen with Reflex to Confirmation   Result Value Ref Range    HIV 1/2 Antigen/Antibody Screen with Reflex to Confirmation Nonreactive Nonreactive   Hepatitis C antibody   Result Value Ref Range    Hepatitis C AB Nonreactive Nonreactive   Hepatitis B surface antigen   Result Value Ref Range    Hepatitis B Surface AG Nonreactive Nonreactive   Hepatitis B surface antibody   Result Value Ref Range    Hepatitis B Surface AB 27.0 (H) <10.0 mIU/mL     Imaging  Lower extremity venous duplex bilateral  Result Date: 5/2/2025  No sonographic evidence for deep vein thrombosis within the evaluated veins of the bilateral lower extremities.   I personally reviewed the  images/study and I agree with the findings as stated by resident Erasto Avila. This study was interpreted at [place], [place].   MACRO: None   Signed by: Josue Arriaga 5/2/2025 10:13 PM Dictation workstation:   [id number]    CT abdomen pelvis w IV contrast  Result Date: 5/2/2025  1. No definite evidence of metastatic disease in the abdomen or pelvis. 2. Sequela of prior granulomatous disease in the liver and spleen. 3. Please refer to the prior CT dated 05/01/2025 for better evaluation consolidative opacities of the right middle lobe.   I personally reviewed the image(s) / study and I agree with the findings as stated by Hang Marin MD. This study was interpreted at [place], [place].   MACRO: None.   Signed by: Teddy Welsh 5/2/2025 3:35 PM Dictation workstation:   [id number]    Lower extremity venous duplex bilateral  Narrative: Interpreted By:  Josue Arriaga,  and Austin Maurer   STUDY:  [facility] US LOWER EXTREMITY VENOUS DUPLEX BILATERAL;  5/2/2025 10:44 am      INDICATION:  Signs/Symptoms:Pulm Art thrombus vs PE. R/o LE clot burden iso new  Lung Malignancy..      ,I26.99 Other pulmonary embolism without acute cor pulmonale      COMPARISON:  None.      ACCESSION NUMBER(S):  [id number]      ORDERING CLINICIAN:  DALIA RYDER      TECHNIQUE:  Vascular ultrasound of the bilateral lower extremities was performed.  Real-time compression views as well as Gray scale, color Doppler and  spectral Doppler waveform analysis was performed.      FINDINGS:  Evaluation of the visualized portions of the bilateral common femoral  vein, proximal, mid, and distal femoral vein, and popliteal vein were  performed.  Evaluation of the visualized portions of the  posterior  tibial and peroneal veins were also performed.      Limitations:  None.      The evaluated veins demonstrate normal compressibility. There is  intact venous flow demonstrating normal  respiratory variability and  normal augmentation of flow with calf compression. Therefore, there  is no ultrasonographic evidence for deep vein thrombosis within the  evaluated veins.      Impression: No sonographic evidence for deep vein thrombosis within the evaluated  veins of the bilateral lower extremities.      I personally reviewed the images/study and I agree with the findings  as stated by resident Erasto Avila. This study was interpreted  at [facility], [place], [place].      MACRO:  None      Signed by: Josue Arriaga 5/2/2025 10:13 PM  Dictation workstation:   [id number]  CT abdomen pelvis w IV contrast  Narrative: Interpreted By:  Teddy Welsh and Jiang Sirui   STUDY:  CT ABDOMEN PELVIS W IV CONTRAST;  5/2/2025 12:39 pm      INDICATION:  Signs/Symptoms:evaluate for metastatic disease in abdomen/pelvis.          COMPARISON:  Multiple prior chest CTs most recently dated 05/01/2025      ACCESSION NUMBER(S):  [id number]      ORDERING CLINICIAN:  DALIA RYDER      TECHNIQUE:  Contiguous axial images of the abdomen and pelvis were obtained after  the intravenous administration of 75 mL Omnipaque 350 contrast.  Coronal and sagittal reformatted images were reconstructed from the  axial data.      FINDINGS:  LOWER CHEST: Partially visualized consolidative opacity of the right  middle lobe better evaluated on the CT chest dated 05/01/2025.  Otherwise, the lung bases are clear.          ABDOMEN/PELVIS:      ABDOMINAL WALL: No significant abnormality.      LIVER: The liver is normal in size with scattered parenchymal  calcifications.      BILE DUCTS: No significant intrahepatic or extrahepatic dilatation.      GALLBLADDER: Cholelithiasis without evidence of acute cholecystitis.      PANCREAS: No significant abnormality.      SPLEEN: The spleen is normal in size with multiple parenchymal  calcifications.      ADRENALS: No significant abnormality.      KIDNEYS, URETERS,  BLADDER: Kidneys are similar in size and  enhancement. No hydroureteronephrosis or nephroureterolithiasis. The  urinary bladder is decompressed, limited for evaluation.      REPRODUCTIVE ORGANS: The uterus is retroverted. No adnexal masses.      VESSELS: Abdominal aorta and IVC are unremarkable.      RETROPERITONEUM/LYMPH NODES: No enlarged lymph nodes.      BOWEL/MESENTERY/PERITONEUM: The stomach is decompressed, limited  evaluation. No inflammatory bowel wall thickening or dilatation.  Normal appendix.      No ascites, free air, or fluid collection.          MUSCULOSKELETAL: No acute osseous abnormality.      Impression: 1. No definite evidence of metastatic disease in the abdomen or  pelvis.  2. Sequela of prior granulomatous disease in the liver and spleen.  3. Please refer to the prior CT dated 05/01/2025 for better  evaluation consolidative opacities of the right middle lobe.      I personally reviewed the image(s) / study and I agree with the  findings as stated by Hang Marin MD. This study was interpreted at  [place], [place].      MACRO:  None.      Signed by: Teddy Welsh 5/2/2025 3:35 PM  Dictation workstation:   [id number]  Transthoracic Echo [facility], [address] [place] [address]                 Tel [phone] and Fax [phone]    TRANSTHORACIC ECHOCARDIOGRAM REPORT       Patient Name:       LAILA Lofton Physician:    [id number] Wanda Wang MD  Study Date:         5/2/2025            Ordering Provider:    [id number] DALIA RYDER  MRN/PID:            [id number]            Fellow:  Accession#:         [id number]        Nurse:  Date of Birth/Age:  1990 / 34      Sonographer:          Kasie brewer RDCS, JOSE  Gender  assigned at  F                   Additional Staff:  Birth:  Height:             175.26 cm           Admit Date:           5/1/2025  Weight:             90.72 kg            Admission Status:     Inpatient -                                                                Routine  BSA / BMI:          2.07 m2 / 29.54     Encounter#:           [id number]                      kg/m2  Blood Pressure:     115/77 mmHg         Department Location:  [facility]                                                                Non Invasive    Study Type:    TRANSTHORACIC ECHO (TTE) COMPLETE  Diagnosis/ICD: Other pulmonary embolism without acute cor pulmonale-I26.99  Indication:    Pulmonary Embolus  CPT Code:      Echo Complete w Full Doppler-[id number]    Patient History:  Pertinent History: HTN and Dyspnea. SVC obstruction, MICHELLE on CPAP, CKD.    Study Detail: The following Echo studies were performed: 2D, M-Mode, Doppler and                color flow.       PHYSICIAN INTERPRETATION:  Left Ventricle: Left ventricular ejection fraction is normal, by visual estimate at 65-70%. There are no regional left ventricular wall motion abnormalities. The left ventricular cavity size is normal. There is mildly increased septal and normal posterior left ventricular wall thickness. Spectral Doppler shows a normal pattern of left ventricular diastolic filling.  Left Atrium: The left atrial size is normal.  Right Ventricle: The right ventricle is normal in size. There is normal right ventricular global systolic function.  Right Atrium: The right atrium is normal in size.  Aortic Valve: The aortic valve is trileaflet. There is no evidence of aortic valve regurgitation. The peak instantaneous gradient of the aortic valve is 6 mmHg.  Mitral Valve: The mitral valve is normal in structure. There is no evidence of mitral valve regurgitation.  Tricuspid Valve: The tricuspid valve is structurally normal. No evidence of tricuspid regurgitation. The right  ventricular systolic pressure is unable to be estimated.  Pulmonic Valve: The pulmonic valve is structurally normal. There is no indication of pulmonic valve regurgitation.  Pericardium: Trivial pericardial effusion.  Aorta: The aortic root is normal. The Asc Ao is 2.90 cm. There is no dilatation of the ascending aorta.  Systemic Veins: The inferior vena cava appears normal in size, with IVC inspiratory collapse greater than 50%.  In comparison to the previous echocardiogram(s): There are no prior studies on this patient for comparison purposes.       CONCLUSIONS:   1. Left ventricular ejection fraction is normal, by visual estimate at 65-70%.   2. There is normal right ventricular global systolic function.    QUANTITATIVE DATA SUMMARY:     2D MEASUREMENTS:          Normal Ranges:  Ao Root d:       3.00 cm  (2.0-3.7cm)  LAs:             3.80 cm  (2.7-4.0cm)  IVSd:            1.00 cm  (0.6-1.1cm)  LVPWd:           0.80 cm  (0.6-1.1cm)  LVIDd:           4.30 cm  (3.9-5.9cm)  LVIDs:           2.60 cm  LV Mass Index:   60 g/m2  LVEDV Index:     41 ml/m2  LV % FS          39.5 %       LEFT ATRIUM:                  Normal Ranges:  LA Vol A4C:        43.6 ml    (22+/-6mL/m2)  LA Vol A2C:        34.6 ml  LA Vol BP:         40.1 ml  LA Vol Index A4C:  21.1ml/m2  LA Vol Index A2C:  16.8 ml/m2  LA Vol Index BP:   19.4 ml/m2  LA Area A4C:       15.1 cm2  LA Area A2C:       13.9 cm2  LA Major Axis A4C: 4.4 cm  LA Major Axis A2C: 4.7 cm  LA Volume Index:   19.4 ml/m2       RIGHT ATRIUM:         Normal Ranges:  RA Area A4C:  9.3 cm2       AORTA MEASUREMENTS:         Normal Ranges:  Asc Ao, d:          2.90 cm (2.1-3.4cm)       LV SYSTOLIC FUNCTION:                       Normal Ranges:  EF-A4C View:    73 % (>=55%)  EF-A2C View:    62 %  EF-Biplane:     68 %  EF-Visual:      68 %  LV EF Reported: 68 %       LV DIASTOLIC FUNCTION:             Normal Ranges:  MV Peak E:             0.82 m/s    (0.7-1.2 m/s)  MV Peak A:              0.68 m/s    (0.42-0.7 m/s)  E/A Ratio:             1.22        (1.0-2.2)  MV e'                  0.108 m/s   (>8.0)  MV lateral e'          0.13 m/s  MV medial e'           0.09 m/s  MV A Dur:              116.00 msec  E/e' Ratio:            7.59        (<8.0)  MV DT:                 180 msec    (150-240 msec)       MITRAL VALVE:          Normal Ranges:  MV DT:        180 msec (150-240msec)       AORTIC VALVE:           Normal Ranges:  AoV Vmax:      1.24 m/s (<=1.7m/s)  AoV Peak P.2 mmHg (<20mmHg)  LVOT Max Jl:  1.01 m/s (<=1.1m/s)  LVOT VTI:      21.60 cm  LVOT Diameter: 2.20 cm  (1.8-2.4cm)  AoV Area,Vmax: 3.10 cm2 (2.5-4.5cm2)       RIGHT VENTRICLE:  RV Basal 2.70 cm  RV Mid   2.00 cm  RV Major 6.8 cm  TAPSE:   23.2 mm  RV s'    0.10 m/s       TRICUSPID VALVE/RVSP:         Normal Ranges:  IVC Diam:             1.63 cm       PULMONIC VALVE:          Normal Ranges:  PV Max Jl:     0.8 m/s  (0.6-0.9m/s)  PV Max P.8 mmHg       [id number] Wanda Wang MD  Electronically signed on 2025 at 12:10:13 PM       ** Final **  ECG 12 lead  Normal sinus rhythm  Normal ECG  When compared with ECG of 31-DEC-2023 16:11,  Vent. rate has decreased BY  47 BPM  See ED provider note for full interpretation and clinical correlation  Confirmed by Lily Palomo ([id number]) on 2025 9:52:54 AM    Physical Exam  Constitutional:       General: She is not in acute distress.     Appearance: Normal appearance. She is obese. She is not ill-appearing or diaphoretic.   HENT:      Head: Normocephalic and atraumatic.      Nose: No congestion or rhinorrhea.   Eyes:      General: No scleral icterus.     Extraocular Movements: Extraocular movements intact.      Conjunctiva/sclera: Conjunctivae normal.      Pupils: Pupils are equal, round, and reactive to light.   Cardiovascular:      Rate and Rhythm: Normal rate and regular rhythm.      Heart sounds: No murmur heard.     No friction rub. No gallop.   Pulmonary:      Effort: Pulmonary  effort is normal. No respiratory distress.      Breath sounds: No wheezing, rhonchi or rales.      Comments: Diminished breath sounds in R middle and superior lobes.  Chest:      Chest wall: No tenderness.   Abdominal:      General: Abdomen is flat. Bowel sounds are normal. There is no distension.      Palpations: Abdomen is soft. There is no mass.      Tenderness: There is no abdominal tenderness. There is no guarding or rebound.   Musculoskeletal:         General: No swelling, tenderness, deformity or signs of injury.      Cervical back: Neck supple. No tenderness.      Right lower leg: No edema.      Left lower leg: No edema.   Skin:     Capillary Refill: Capillary refill takes less than 2 seconds.      Coloration: Skin is not jaundiced or pale.      Findings: No bruising, erythema, lesion or rash.      Comments: Bruise/discoloration on R lower shin 2/2 trauma.   Neurological:      General: No focal deficit present.      Mental Status: She is alert and oriented to person, place, and time.      Motor: No weakness.      Gait: Gait normal.   Psychiatric:         Mood and Affect: Mood normal.         Behavior: Behavior normal.         Thought Content: Thought content normal.         Judgment: Judgment normal.       Medications  Scheduled medications  enoxaparin, 40 mg, subcutaneous, q24h  [Held by provider] metoprolol succinate XL, 50 mg, oral, Daily  polyethylene glycol, 17 g, oral, Daily  sennosides-docusate sodium, 1 tablet, oral, Nightly  [Held by provider] triamterene-hydrochlorothiazid, 1 tablet, oral, Daily      Continuous medications       PRN medications  PRN medications: dextromethorphan-guaifenesin, melatonin      Assessment/Plan       Tatiana Prabhakar is a 34 y.o. 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 Chronic histoplasmosis also a consideration with remote history of positive urine antigen and at that time her provider did not recommend any treatment. The hilar mass has demonstrated compression of her SVC since 2021 although patient is currently asymptomatic beyond coughing. Historically, patient does note episodes of dizziness and shortness of breath, as on admission, consistent with SVC compression but denies changes in size/swelling of her face, neck, and upper extremities. CT revealed lack of opacification of RUL pulmonary artery with c/f thrombus/embolus but DVT US negative for thrombus.     Updates 5/3/2025:  - Pending IR Stent by IR on Monday.     #Chronic R Hilar Mass  #Fibrosing mediastinitis +/- sarcoidosis  - Pt states significant weight loss of ~50Ibs over 12 months but large intentional component.  - No significant smoking hx other than Marijuana. Denies any Vape pen use  - Family Hx of sarcoidosis in mom (lung and brain) who was diagnosed 1-2 years ago with symptom onset 6 years ago of dizziness and headaches. Her mom is currently on methotrexate and gets ?? infusions  - CT A/P significant for sequela of prior granulomatous disease in the liver and spleen    PLAN:  * Pulm recs:  - IR for SVC stenting on Monday.   - No plan to start Rituximab therapy inpatient, still is being considered for it but not confirmed.     #Opacification of RUL pulmonary artery  - US w/ Duplex negative for DVT  - Echo unremarkable.    PLAN:  * D/c heparin with low concern for thrombus per pulm  * Lovenox for DVT prophylaxis  * Can consider d/c of telemetry, continuous pulse ox    #Persistent cough  #Chest tightness, resolved  #Chronic SVC syndrome   - Currently asymptomatic except for coughing, managed by robitussin  - No ACS concerns given nl EKG, trops  - Hx of recent respiratory infection with respiratory panel negative  PLAN:  * Can consider  dexamethasone if current symptoms progress  * Can consider airway clearance with RT    #HTN   #Hx of Orthostatic HoTN   - Previously on Amlodipine which was d/meg w/ start of complaint of positional HoTN abt 1 yr ago  PLAN:  * Hold metop succ 50 mg daily. Hold for SBP <120, HR <60  * Hold triamterene-HCTZ 37.5-25 daily. Hold for SBP <120, HR <60  * Monitor pressures    #Chronic Constipation  #Intermittent red blood with BM  - Since childhood. Has required enemas in hospital and at home  - Associated with straining, likely etiology anal fissure vs hemorrhoid, less likely diverticula  - Drop in Hgb at OSH to 10.4 but now back to baseline at 12.0  PLAN:  * C/w Miralax, Doc-Senna daily  * Assess BM for blood  * Daily CBCs  * Hold off on repeat colonoscopy at this time    #Obesity   #MICHELLE   - On phentermine at home for weight loss  - Last sleep study in 2019 and repeat ordered and currently pending  - Not on CPAP at home  PLAN:  * Will hold Phentermine whilst inpt     #Anxiety  #Insomnia   - Pt has anxiety about the prognosis of her mass  PLAN:  * C/w Melatonin 5mg PRN at bedtime  * Consider Atarax 10mg PRN    Dispo: To home, pending SVC stenting by IR.    F: PRN  E: Replete PRN for K>3.5, Mg>1.8  N: Regular diet  A: PIV x 2  O2: none  Abx: none  GI: Miralax, docusate, sennosides  DVT: Lovenox     Code: Full code  SDM: Yi Neal (Mother, [phone])

## 2025-05-03 NOTE — CARE PLAN
Problem: Pain - Adult  Goal: Verbalizes/displays adequate comfort level or baseline comfort level  Outcome: Progressing     Problem: Safety - Adult  Goal: Free from fall injury  Outcome: Progressing     Problem: Discharge Planning  Goal: Discharge to home or other facility with appropriate resources  Outcome: Progressing     Problem: Chronic Conditions and Co-morbidities  Goal: Patient's chronic conditions and co-morbidity symptoms are monitored and maintained or improved  Outcome: Progressing     Problem: Nutrition  Goal: Nutrient intake appropriate for maintaining nutritional needs  Outcome: Progressing       The clinical goals for the shift include pt will remain VSS throughout shift

## 2025-05-04 VITALS
HEART RATE: 97 BPM | OXYGEN SATURATION: 100 % | RESPIRATION RATE: 16 BRPM | HEIGHT: 69 IN | TEMPERATURE: 98.1 F | SYSTOLIC BLOOD PRESSURE: 124 MMHG | DIASTOLIC BLOOD PRESSURE: 80 MMHG | BODY MASS INDEX: 29.62 KG/M2 | WEIGHT: 200 LBS

## 2025-05-04 LAB
ALBUMIN SERPL BCP-MCNC: 3.8 G/DL (ref 3.4–5)
ANION GAP SERPL CALC-SCNC: 12 MMOL/L (ref 10–20)
APTT PPP: 33 SECONDS (ref 26–36)
BASOPHILS # BLD AUTO: 0.04 X10*3/UL (ref 0–0.1)
BASOPHILS NFR BLD AUTO: 0.8 %
BUN SERPL-MCNC: 12 MG/DL (ref 6–23)
CALCIUM SERPL-MCNC: 9.5 MG/DL (ref 8.6–10.6)
CHLORIDE SERPL-SCNC: 104 MMOL/L (ref 98–107)
CO2 SERPL-SCNC: 27 MMOL/L (ref 21–32)
CREAT SERPL-MCNC: 1.05 MG/DL (ref 0.5–1.05)
EGFRCR SERPLBLD CKD-EPI 2021: 71 ML/MIN/1.73M*2
EOSINOPHIL # BLD AUTO: 0.12 X10*3/UL (ref 0–0.7)
EOSINOPHIL NFR BLD AUTO: 2.3 %
ERYTHROCYTE [DISTWIDTH] IN BLOOD BY AUTOMATED COUNT: 12.7 % (ref 11.5–14.5)
GLUCOSE SERPL-MCNC: 78 MG/DL (ref 74–99)
HCT VFR BLD AUTO: 35.1 % (ref 36–46)
HGB BLD-MCNC: 11 G/DL (ref 12–16)
IMM GRANULOCYTES # BLD AUTO: 0.03 X10*3/UL (ref 0–0.7)
IMM GRANULOCYTES NFR BLD AUTO: 0.6 % (ref 0–0.9)
INR PPP: 1.1 (ref 0.9–1.1)
LYMPHOCYTES # BLD AUTO: 1.48 X10*3/UL (ref 1.2–4.8)
LYMPHOCYTES NFR BLD AUTO: 28.7 %
MAGNESIUM SERPL-MCNC: 1.86 MG/DL (ref 1.6–2.4)
MCH RBC QN AUTO: 27.7 PG (ref 26–34)
MCHC RBC AUTO-ENTMCNC: 31.3 G/DL (ref 32–36)
MCV RBC AUTO: 88 FL (ref 80–100)
MONOCYTES # BLD AUTO: 0.28 X10*3/UL (ref 0.1–1)
MONOCYTES NFR BLD AUTO: 5.4 %
NEUTROPHILS # BLD AUTO: 3.21 X10*3/UL (ref 1.2–7.7)
NEUTROPHILS NFR BLD AUTO: 62.2 %
NRBC BLD-RTO: 0 /100 WBCS (ref 0–0)
PHOSPHATE SERPL-MCNC: 3.6 MG/DL (ref 2.5–4.9)
PLATELET # BLD AUTO: 310 X10*3/UL (ref 150–450)
POTASSIUM SERPL-SCNC: 3.5 MMOL/L (ref 3.5–5.3)
PROTHROMBIN TIME: 12.1 SECONDS (ref 9.8–12.4)
RBC # BLD AUTO: 3.97 X10*6/UL (ref 4–5.2)
SODIUM SERPL-SCNC: 139 MMOL/L (ref 136–145)
WBC # BLD AUTO: 5.2 X10*3/UL (ref 4.4–11.3)

## 2025-05-04 PROCEDURE — 2500000001 HC RX 250 WO HCPCS SELF ADMINISTERED DRUGS (ALT 637 FOR MEDICARE OP): Mod: SE

## 2025-05-04 PROCEDURE — 85610 PROTHROMBIN TIME: CPT

## 2025-05-04 PROCEDURE — 2500000004 HC RX 250 GENERAL PHARMACY W/ HCPCS (ALT 636 FOR OP/ED): Mod: SE

## 2025-05-04 PROCEDURE — 80069 RENAL FUNCTION PANEL: CPT

## 2025-05-04 PROCEDURE — 85025 COMPLETE CBC W/AUTO DIFF WBC: CPT

## 2025-05-04 PROCEDURE — 36415 COLL VENOUS BLD VENIPUNCTURE: CPT

## 2025-05-04 PROCEDURE — 2500000002 HC RX 250 W HCPCS SELF ADMINISTERED DRUGS (ALT 637 FOR MEDICARE OP, ALT 636 FOR OP/ED): Mod: SE | Performed by: STUDENT IN AN ORGANIZED HEALTH CARE EDUCATION/TRAINING PROGRAM

## 2025-05-04 PROCEDURE — 1170000001 HC PRIVATE ONCOLOGY ROOM DAILY

## 2025-05-04 PROCEDURE — 2500000004 HC RX 250 GENERAL PHARMACY W/ HCPCS (ALT 636 FOR OP/ED): Mod: JZ,SE | Performed by: STUDENT IN AN ORGANIZED HEALTH CARE EDUCATION/TRAINING PROGRAM

## 2025-05-04 PROCEDURE — 99233 SBSQ HOSP IP/OBS HIGH 50: CPT | Performed by: INTERNAL MEDICINE

## 2025-05-04 PROCEDURE — 99232 SBSQ HOSP IP/OBS MODERATE 35: CPT | Performed by: STUDENT IN AN ORGANIZED HEALTH CARE EDUCATION/TRAINING PROGRAM

## 2025-05-04 PROCEDURE — 83735 ASSAY OF MAGNESIUM: CPT

## 2025-05-04 PROCEDURE — 85730 THROMBOPLASTIN TIME PARTIAL: CPT

## 2025-05-04 RX ORDER — ENOXAPARIN SODIUM 100 MG/ML
40 INJECTION SUBCUTANEOUS EVERY 24 HOURS
Status: COMPLETED | OUTPATIENT
Start: 2025-05-04 | End: 2025-05-04

## 2025-05-04 RX ORDER — POTASSIUM CHLORIDE 20 MEQ/1
40 TABLET, EXTENDED RELEASE ORAL ONCE
Status: COMPLETED | OUTPATIENT
Start: 2025-05-04 | End: 2025-05-04

## 2025-05-04 RX ADMIN — ENOXAPARIN SODIUM 40 MG: 100 INJECTION SUBCUTANEOUS at 08:35

## 2025-05-04 RX ADMIN — GUAIFENESIN AND DEXTROMETHORPHAN 5 ML: 100; 10 SYRUP ORAL at 22:05

## 2025-05-04 RX ADMIN — POTASSIUM CHLORIDE 40 MEQ: 1500 TABLET, EXTENDED RELEASE ORAL at 11:47

## 2025-05-04 ASSESSMENT — COGNITIVE AND FUNCTIONAL STATUS - GENERAL
MOBILITY SCORE: 24
MOBILITY SCORE: 24
DAILY ACTIVITIY SCORE: 24
DAILY ACTIVITIY SCORE: 24

## 2025-05-04 ASSESSMENT — PAIN - FUNCTIONAL ASSESSMENT
PAIN_FUNCTIONAL_ASSESSMENT: 0-10

## 2025-05-04 ASSESSMENT — PAIN SCALES - GENERAL
PAINLEVEL_OUTOF10: 0 - NO PAIN

## 2025-05-04 NOTE — CARE PLAN
Problem: Pain - Adult  Goal: Verbalizes/displays adequate comfort level or baseline comfort level  Outcome: Progressing     Problem: Safety - Adult  Goal: Free from fall injury  Outcome: Progressing     Problem: Discharge Planning  Goal: Discharge to home or other facility with appropriate resources  Outcome: Progressing     Problem: Chronic Conditions and Co-morbidities  Goal: Patient's chronic conditions and co-morbidity symptoms are monitored and maintained or improved  Outcome: Progressing     Problem: Nutrition  Goal: Nutrient intake appropriate for maintaining nutritional needs  Outcome: Progressing   The patient's goals for the shift include      The clinical goals for the shift include Pt. will remain HDS and VSS through end of shift 5/4 1900.    Pt able to rest through the night with out complaint.

## 2025-05-04 NOTE — PROGRESS NOTES
"Tatiana Prabhakar is a 34 y.o. female on day 3 of admission presenting with SVC (superior vena cava obstruction).    Subjective   No new complaints, overall feeling well.     Objective   /77 (BP Location: Right arm, Patient Position: Lying)   Pulse 68   Temp 36.7 °C (98.1 °F) (Temporal)   Resp 16   Ht 1.753 m (5' 9\")   Wt 90.7 kg (200 lb)   SpO2 100%   BMI 29.53 kg/m²     General: well appearing in NAD  HENT: atraumatic, normocephalic  Respiratory: CTAB, no excessive respiratory effort on RA  Cardio: RRR, no mrg  GI: NT/ND  Extremities: WWP with no edema  Neuro: no gross deficits  Psych: cooperative    Last Recorded Vitals  Blood pressure 125/77, pulse 68, temperature 36.7 °C (98.1 °F), temperature source Temporal, resp. rate 16, height 1.753 m (5' 9\"), weight 90.7 kg (200 lb), SpO2 100%.  Intake/Output last 3 Shifts:  No intake/output data recorded.    Relevant Results  Lower extremity venous duplex bilateral  Result Date: 5/2/2025  Interpreted By:  Josue Arriaga and Ritchie Brandon STUDY: [facility] LOWER EXTREMITY VENOUS DUPLEX BILATERAL;  5/2/2025 10:44 am   INDICATION: Signs/Symptoms:Pulm Art thrombus vs PE. R/o LE clot burden iso new Lung Malignancy..   ,I26.99 Other pulmonary embolism without acute cor pulmonale   COMPARISON: None.   ACCESSION NUMBER(S): [id number]   ORDERING CLINICIAN: DALIA RYDER   TECHNIQUE: Vascular ultrasound of the bilateral lower extremities was performed. Real-time compression views as well as Gray scale, color Doppler and spectral Doppler waveform analysis was performed.   FINDINGS: Evaluation of the visualized portions of the bilateral common femoral vein, proximal, mid, and distal femoral vein, and popliteal vein were performed.  Evaluation of the visualized portions of the  posterior tibial and peroneal veins were also performed.   Limitations:  None.   The evaluated veins demonstrate normal compressibility. There is intact venous flow demonstrating normal respiratory " variability and normal augmentation of flow with calf compression. Therefore, there is no ultrasonographic evidence for deep vein thrombosis within the evaluated veins.       No sonographic evidence for deep vein thrombosis within the evaluated veins of the bilateral lower extremities.   I personally reviewed the images/study and I agree with the findings as stated by resident Erasto Avila. This study was interpreted at [place], [place].   MACRO: None   Signed by: Josue Arriaga 5/2/2025 10:13 PM Dictation workstation:   [id number]    CT abdomen pelvis w IV contrast  Result Date: 5/2/2025  Interpreted By:  Teddy Welsh and Jiang Sirui STUDY: CT ABDOMEN PELVIS W IV CONTRAST;  5/2/2025 12:39 pm   INDICATION: Signs/Symptoms:evaluate for metastatic disease in abdomen/pelvis.     COMPARISON: Multiple prior chest CTs most recently dated 05/01/2025   ACCESSION NUMBER(S): [id number]   ORDERING CLINICIAN: DALIA RYDER   TECHNIQUE: Contiguous axial images of the abdomen and pelvis were obtained after the intravenous administration of 75 mL Omnipaque 350 contrast. Coronal and sagittal reformatted images were reconstructed from the axial data.   FINDINGS: LOWER CHEST: Partially visualized consolidative opacity of the right middle lobe better evaluated on the CT chest dated 05/01/2025. Otherwise, the lung bases are clear.     ABDOMEN/PELVIS:   ABDOMINAL WALL: No significant abnormality.   LIVER: The liver is normal in size with scattered parenchymal calcifications.   BILE DUCTS: No significant intrahepatic or extrahepatic dilatation.   GALLBLADDER: Cholelithiasis without evidence of acute cholecystitis.   PANCREAS: No significant abnormality.   SPLEEN: The spleen is normal in size with multiple parenchymal calcifications.   ADRENALS: No significant abnormality.   KIDNEYS, URETERS, BLADDER: Kidneys are similar in size and enhancement. No hydroureteronephrosis or  nephroureterolithiasis. The urinary bladder is decompressed, limited for evaluation.   REPRODUCTIVE ORGANS: The uterus is retroverted. No adnexal masses.   VESSELS: Abdominal aorta and IVC are unremarkable.   RETROPERITONEUM/LYMPH NODES: No enlarged lymph nodes.   BOWEL/MESENTERY/PERITONEUM: The stomach is decompressed, limited evaluation. No inflammatory bowel wall thickening or dilatation. Normal appendix.   No ascites, free air, or fluid collection.     MUSCULOSKELETAL: No acute osseous abnormality.       1. No definite evidence of metastatic disease in the abdomen or pelvis. 2. Sequela of prior granulomatous disease in the liver and spleen. 3. Please refer to the prior CT dated 05/01/2025 for better evaluation consolidative opacities of the right middle lobe.   I personally reviewed the image(s) / study and I agree with the findings as stated by Hang Marin MD. This study was interpreted at [place], [place].   MACRO: None.   Signed by: Teddy Welsh 5/2/2025 3:35 PM Dictation workstation:   [id number]    Transthoracic Echo Complete  Result Date: 5/2/2025   [facility], [address] [place] [address]                Tel [phone] and Fax [phone] TRANSTHORACIC ECHOCARDIOGRAM REPORT  Patient Name:       LAILA Lofton Physician:    [id number] Wanda Wang MD Study Date:         5/2/2025            Ordering Provider:    [id number] DALIA RYDER MRN/PID:            [id number]            Fellow: Accession#:         [id number]        Nurse: Date of Birth/Age:  1990 / 34      Sonographer:          JOSE Altman RDCS Gender assigned at  F                   Additional Staff: Birth: Height:             175.26 cm           Admit Date:           5/1/2025  Weight:             90.72 kg            Admission Status:     Inpatient -                                                               Routine BSA / BMI:          2.07 m2 / 29.54     Encounter#:           [id number]                     kg/m2 Blood Pressure:     115/77 mmHg         Department Location:  [facility]                                                               Non Invasive Study Type:    TRANSTHORACIC ECHO (TTE) COMPLETE Diagnosis/ICD: Other pulmonary embolism without acute cor pulmonale-I26.99 Indication:    Pulmonary Embolus CPT Code:      Echo Complete w Full [id number] Patient History: Pertinent History: HTN and Dyspnea. SVC obstruction, MICHELLE on CPAP, CKD. Study Detail: The following Echo studies were performed: 2D, M-Mode, Doppler and               color flow.  PHYSICIAN INTERPRETATION: Left Ventricle: Left ventricular ejection fraction is normal, by visual estimate at 65-70%. There are no regional left ventricular wall motion abnormalities. The left ventricular cavity size is normal. There is mildly increased septal and normal posterior left ventricular wall thickness. Spectral Doppler shows a normal pattern of left ventricular diastolic filling. Left Atrium: The left atrial size is normal. Right Ventricle: The right ventricle is normal in size. There is normal right ventricular global systolic function. Right Atrium: The right atrium is normal in size. Aortic Valve: The aortic valve is trileaflet. There is no evidence of aortic valve regurgitation. The peak instantaneous gradient of the aortic valve is 6 mmHg. Mitral Valve: The mitral valve is normal in structure. There is no evidence of mitral valve regurgitation. Tricuspid Valve: The tricuspid valve is structurally normal. No evidence of tricuspid regurgitation. The right ventricular systolic pressure is unable to be estimated. Pulmonic Valve: The pulmonic valve is structurally normal. There is no indication of pulmonic valve  regurgitation. Pericardium: Trivial pericardial effusion. Aorta: The aortic root is normal. The Asc Ao is 2.90 cm. There is no dilatation of the ascending aorta. Systemic Veins: The inferior vena cava appears normal in size, with IVC inspiratory collapse greater than 50%. In comparison to the previous echocardiogram(s): There are no prior studies on this patient for comparison purposes.  CONCLUSIONS:  1. Left ventricular ejection fraction is normal, by visual estimate at 65-70%.  2. There is normal right ventricular global systolic function. QUANTITATIVE DATA SUMMARY:  2D MEASUREMENTS:          Normal Ranges: Ao Root d:       3.00 cm  (2.0-3.7cm) LAs:             3.80 cm  (2.7-4.0cm) IVSd:            1.00 cm  (0.6-1.1cm) LVPWd:           0.80 cm  (0.6-1.1cm) LVIDd:           4.30 cm  (3.9-5.9cm) LVIDs:           2.60 cm LV Mass Index:   60 g/m2 LVEDV Index:     41 ml/m2 LV % FS          39.5 %  LEFT ATRIUM:                  Normal Ranges: LA Vol A4C:        43.6 ml    (22+/-6mL/m2) LA Vol A2C:        34.6 ml LA Vol BP:         40.1 ml LA Vol Index A4C:  21.1ml/m2 LA Vol Index A2C:  16.8 ml/m2 LA Vol Index BP:   19.4 ml/m2 LA Area A4C:       15.1 cm2 LA Area A2C:       13.9 cm2 LA Major Axis A4C: 4.4 cm LA Major Axis A2C: 4.7 cm LA Volume Index:   19.4 ml/m2  RIGHT ATRIUM:         Normal Ranges: RA Area A4C:  9.3 cm2  AORTA MEASUREMENTS:         Normal Ranges: Asc Ao, d:          2.90 cm (2.1-3.4cm)  LV SYSTOLIC FUNCTION:                      Normal Ranges: EF-A4C View:    73 % (>=55%) EF-A2C View:    62 % EF-Biplane:     68 % EF-Visual:      68 % LV EF Reported: 68 %  LV DIASTOLIC FUNCTION:             Normal Ranges: MV Peak E:             0.82 m/s    (0.7-1.2 m/s) MV Peak A:             0.68 m/s    (0.42-0.7 m/s) E/A Ratio:             1.22        (1.0-2.2) MV e'                  0.108 m/s   (>8.0) MV lateral e'          0.13 m/s MV medial e'           0.09 m/s MV A Dur:              116.00 msec E/e' Ratio:             7.59        (<8.0) MV DT:                 180 msec    (150-240 msec)  MITRAL VALVE:          Normal Ranges: MV DT:        180 msec (150-240msec)  AORTIC VALVE:           Normal Ranges: AoV Vmax:      1.24 m/s (<=1.7m/s) AoV Peak P.2 mmHg (<20mmHg) LVOT Max Jl:  1.01 m/s (<=1.1m/s) LVOT VTI:      21.60 cm LVOT Diameter: 2.20 cm  (1.8-2.4cm) AoV Area,Vmax: 3.10 cm2 (2.5-4.5cm2)  RIGHT VENTRICLE: RV Basal 2.70 cm RV Mid   2.00 cm RV Major 6.8 cm TAPSE:   23.2 mm RV s'    0.10 m/s  TRICUSPID VALVE/RVSP:         Normal Ranges: IVC Diam:             1.63 cm  PULMONIC VALVE:          Normal Ranges: PV Max Jl:     0.8 m/s  (0.6-0.9m/s) PV Max P.8 mmHg  [id number] Wanda Wang MD Electronically signed on 2025 at 12:10:13 PM  ** Final **     Assessment/Plan:  34 y.o. 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  She is planned for IR placement of SVC stent tomorrow ().    #Fibrosing mediastinitis    Plan:  -Given no evidence of active disease, would not pursue rituximab therapy at this time  -Needs close outpatient pulmonary followup, will request followup with Dr. Hernandez in 3-4 weeks  -Planned for IR placement of SVC stent tomorrow.    Patient seen, examined, and discussed with attending physician Dr. Sheehan.  Pulmonary will sign off, please recall with questions.    Sade Vergara MD

## 2025-05-04 NOTE — PROGRESS NOTES
Tatiana Prabhakar is a 34 y.o. female on day 3 of admission presenting with chest tightness, SOB, and productive cough with CT PE significant for lobulated, partly calcified right hilar mass compressing SVC, positive family history for sarcoidosis, with current differential favoring fibrosing mediastinitis +/- sarcoidosis over malignancy or chronic histoplasmosis, also with possible RUL pulmonary artery thrombus/embolus with pending DVT US. Additional PMHx of HTN, MICHELLE (not on CPAP), chronic constipation, and obesity.    Subjective   NAEON. No fevers, chills, chest pain, or dyspnea.       Objective   Vitals  Last Recorded Vitals  /71 (BP Location: Right arm, Patient Position: Lying)   Pulse 73   Temp 36.3 °C (97.3 °F) (Temporal)   Resp 18   Wt 90.7 kg (200 lb)   SpO2 100%     I/Os  Intake/Output last 3 Shifts:    Intake/Output Summary (Last 24 hours) at 5/4/2025 0717  Last data filed at 5/4/2025 0508  Gross per 24 hour   Intake --   Output 0 ml   Net 0 ml       Admission Weight  Weight: 90.7 kg (200 lb) (05/01/25 1954)    Daily Weight  05/01/25 : 90.7 kg (200 lb)    Lab Trends  Results from last 72 hours   Lab Units 05/03/25  0800 05/02/25  0835   WBC AUTO x10*3/uL 4.6 5.1   HEMOGLOBIN g/dL 12.0 12.0   PLATELETS AUTO x10*3/uL 342 341   SODIUM mmol/L 138 138   POTASSIUM mmol/L 3.4* 3.8   CO2 mmol/L 24 24   BUN mg/dL 10 10   MAGNESIUM mg/dL 1.97 2.04   GLUCOSE mg/dL 95 85      Current Labs  Results for orders placed or performed during the hospital encounter of 05/01/25 (from the past 24 hours)   Coagulation Screen   Result Value Ref Range    Protime 11.9 9.8 - 12.4 seconds    INR 1.1 0.9 - 1.1    aPTT 33 26 - 36 seconds   CBC and Auto Differential   Result Value Ref Range    WBC 4.6 4.4 - 11.3 x10*3/uL    nRBC 0.0 0.0 - 0.0 /100 WBCs    RBC 4.27 4.00 - 5.20 x10*6/uL    Hemoglobin 12.0 12.0 - 16.0 g/dL    Hematocrit 35.6 (L) 36.0 - 46.0 %    MCV 83 80 - 100 fL    MCH 28.1 26.0 - 34.0 pg    MCHC 33.7 32.0 - 36.0  g/dL    RDW 12.7 11.5 - 14.5 %    Platelets 342 150 - 450 x10*3/uL    Neutrophils % 60.7 40.0 - 80.0 %    Immature Granulocytes %, Automated 0.4 0.0 - 0.9 %    Lymphocytes % 31.6 13.0 - 44.0 %    Monocytes % 4.2 2.0 - 10.0 %    Eosinophils % 2.2 0.0 - 6.0 %    Basophils % 0.9 0.0 - 2.0 %    Neutrophils Absolute 2.76 1.20 - 7.70 x10*3/uL    Immature Granulocytes Absolute, Automated 0.02 0.00 - 0.70 x10*3/uL    Lymphocytes Absolute 1.44 1.20 - 4.80 x10*3/uL    Monocytes Absolute 0.19 0.10 - 1.00 x10*3/uL    Eosinophils Absolute 0.10 0.00 - 0.70 x10*3/uL    Basophils Absolute 0.04 0.00 - 0.10 x10*3/uL   Renal function panel   Result Value Ref Range    Glucose 95 74 - 99 mg/dL    Sodium 138 136 - 145 mmol/L    Potassium 3.4 (L) 3.5 - 5.3 mmol/L    Chloride 103 98 - 107 mmol/L    Bicarbonate 24 21 - 32 mmol/L    Anion Gap 14 10 - 20 mmol/L    Urea Nitrogen 10 6 - 23 mg/dL    Creatinine 1.03 0.50 - 1.05 mg/dL    eGFR 72 >60 mL/min/1.73m*2    Calcium 9.2 8.6 - 10.6 mg/dL    Phosphorus 4.5 2.5 - 4.9 mg/dL    Albumin 3.9 3.4 - 5.0 g/dL   Magnesium   Result Value Ref Range    Magnesium 1.97 1.60 - 2.40 mg/dL   Type And Screen   Result Value Ref Range    ABO TYPE B     Rh TYPE POS     ANTIBODY SCREEN NEG    HIV 1/2 Antigen/Antibody Screen with Reflex to Confirmation   Result Value Ref Range    HIV 1/2 Antigen/Antibody Screen with Reflex to Confirmation Nonreactive Nonreactive   Hepatitis C antibody   Result Value Ref Range    Hepatitis C AB Nonreactive Nonreactive   Hepatitis B surface antigen   Result Value Ref Range    Hepatitis B Surface AG Nonreactive Nonreactive   Hepatitis B surface antibody   Result Value Ref Range    Hepatitis B Surface AB 27.0 (H) <10.0 mIU/mL     Imaging  Lower extremity venous duplex bilateral  Result Date: 5/2/2025  No sonographic evidence for deep vein thrombosis within the evaluated veins of the bilateral lower extremities.   I personally reviewed the images/study and I agree with the findings as  stated by resident Erasto Avila. This study was interpreted at [place], [place].   MACRO: None   Signed by: Josue Arriaga 5/2/2025 10:13 PM Dictation workstation:   [id number]    CT abdomen pelvis w IV contrast  Result Date: 5/2/2025  1. No definite evidence of metastatic disease in the abdomen or pelvis. 2. Sequela of prior granulomatous disease in the liver and spleen. 3. Please refer to the prior CT dated 05/01/2025 for better evaluation consolidative opacities of the right middle lobe.   I personally reviewed the image(s) / study and I agree with the findings as stated by Hang Marin MD. This study was interpreted at [place], [place].   MACRO: None.   Signed by: Teddy Welsh 5/2/2025 3:35 PM Dictation workstation:   [id number]    Lower extremity venous duplex bilateral  Narrative: Interpreted By:  Josue Arriaga,  and Austin Maurer   STUDY:  [facility] US LOWER EXTREMITY VENOUS DUPLEX BILATERAL;  5/2/2025 10:44 am      INDICATION:  Signs/Symptoms:Pulm Art thrombus vs PE. R/o LE clot burden iso new  Lung Malignancy..      ,I26.99 Other pulmonary embolism without acute cor pulmonale      COMPARISON:  None.      ACCESSION NUMBER(S):  [id number]      ORDERING CLINICIAN:  DALIA RYDER      TECHNIQUE:  Vascular ultrasound of the bilateral lower extremities was performed.  Real-time compression views as well as Gray scale, color Doppler and  spectral Doppler waveform analysis was performed.      FINDINGS:  Evaluation of the visualized portions of the bilateral common femoral  vein, proximal, mid, and distal femoral vein, and popliteal vein were  performed.  Evaluation of the visualized portions of the  posterior  tibial and peroneal veins were also performed.      Limitations:  None.      The evaluated veins demonstrate normal compressibility. There is  intact venous flow demonstrating normal respiratory variability and  normal  augmentation of flow with calf compression. Therefore, there  is no ultrasonographic evidence for deep vein thrombosis within the  evaluated veins.      Impression: No sonographic evidence for deep vein thrombosis within the evaluated  veins of the bilateral lower extremities.      I personally reviewed the images/study and I agree with the findings  as stated by resident Erasto Avila. This study was interpreted  at [place], [place].      MACRO:  None      Signed by: Josue Arriaga 5/2/2025 10:13 PM  Dictation workstation:   [id number]  CT abdomen pelvis w IV contrast  Narrative: Interpreted By:  Teddy Welsh and Jiang Sirui   STUDY:  CT ABDOMEN PELVIS W IV CONTRAST;  5/2/2025 12:39 pm      INDICATION:  Signs/Symptoms:evaluate for metastatic disease in abdomen/pelvis.          COMPARISON:  Multiple prior chest CTs most recently dated 05/01/2025      ACCESSION NUMBER(S):  [id number]      ORDERING CLINICIAN:  DALIA RYDER      TECHNIQUE:  Contiguous axial images of the abdomen and pelvis were obtained after  the intravenous administration of 75 mL Omnipaque 350 contrast.  Coronal and sagittal reformatted images were reconstructed from the  axial data.      FINDINGS:  LOWER CHEST: Partially visualized consolidative opacity of the right  middle lobe better evaluated on the CT chest dated 05/01/2025.  Otherwise, the lung bases are clear.          ABDOMEN/PELVIS:      ABDOMINAL WALL: No significant abnormality.      LIVER: The liver is normal in size with scattered parenchymal  calcifications.      BILE DUCTS: No significant intrahepatic or extrahepatic dilatation.      GALLBLADDER: Cholelithiasis without evidence of acute cholecystitis.      PANCREAS: No significant abnormality.      SPLEEN: The spleen is normal in size with multiple parenchymal  calcifications.      ADRENALS: No significant abnormality.      KIDNEYS, URETERS, BLADDER: Kidneys are similar in size  and  enhancement. No hydroureteronephrosis or nephroureterolithiasis. The  urinary bladder is decompressed, limited for evaluation.      REPRODUCTIVE ORGANS: The uterus is retroverted. No adnexal masses.      VESSELS: Abdominal aorta and IVC are unremarkable.      RETROPERITONEUM/LYMPH NODES: No enlarged lymph nodes.      BOWEL/MESENTERY/PERITONEUM: The stomach is decompressed, limited  evaluation. No inflammatory bowel wall thickening or dilatation.  Normal appendix.      No ascites, free air, or fluid collection.          MUSCULOSKELETAL: No acute osseous abnormality.      Impression: 1. No definite evidence of metastatic disease in the abdomen or  pelvis.  2. Sequela of prior granulomatous disease in the liver and spleen.  3. Please refer to the prior CT dated 05/01/2025 for better  evaluation consolidative opacities of the right middle lobe.      I personally reviewed the image(s) / study and I agree with the  findings as stated by Hang Marin MD. This study was interpreted at  [place], [place].      MACRO:  None.      Signed by: Teddy Welsh 5/2/2025 3:35 PM  Dictation workstation:   [id number]  Transthoracic Echo [facility], [address] [place] [address]                 Tel [phone] and Fax [phone]    TRANSTHORACIC ECHOCARDIOGRAM REPORT       Patient Name:       LAILA CAVAZOS      Reading Physician:    [id number] Wanda Wang MD  Study Date:         5/2/2025            Ordering Provider:    [id number] DALIA RYDER  MRN/PID:            [id number]            Fellow:  Accession#:         [id number]        Nurse:  Date of Birth/Age:  1990 / 34      Sonographer:          JOSE Altman RDCS  Gender assigned at  F                   Additional  Staff:  Birth:  Height:             175.26 cm           Admit Date:           5/1/2025  Weight:             90.72 kg            Admission Status:     Inpatient -                                                                Routine  BSA / BMI:          2.07 m2 / 29.54     Encounter#:           [id number]                      kg/m2  Blood Pressure:     115/77 mmHg         Department Location:  [facility]                                                                Non Invasive    Study Type:    TRANSTHORACIC ECHO (TTE) COMPLETE  Diagnosis/ICD: Other pulmonary embolism without acute cor pulmonale-I26.99  Indication:    Pulmonary Embolus  CPT Code:      Echo Complete w Full Doppler-[id number]    Patient History:  Pertinent History: HTN and Dyspnea. SVC obstruction, MICHELLE on CPAP, CKD.    Study Detail: The following Echo studies were performed: 2D, M-Mode, Doppler and                color flow.       PHYSICIAN INTERPRETATION:  Left Ventricle: Left ventricular ejection fraction is normal, by visual estimate at 65-70%. There are no regional left ventricular wall motion abnormalities. The left ventricular cavity size is normal. There is mildly increased septal and normal posterior left ventricular wall thickness. Spectral Doppler shows a normal pattern of left ventricular diastolic filling.  Left Atrium: The left atrial size is normal.  Right Ventricle: The right ventricle is normal in size. There is normal right ventricular global systolic function.  Right Atrium: The right atrium is normal in size.  Aortic Valve: The aortic valve is trileaflet. There is no evidence of aortic valve regurgitation. The peak instantaneous gradient of the aortic valve is 6 mmHg.  Mitral Valve: The mitral valve is normal in structure. There is no evidence of mitral valve regurgitation.  Tricuspid Valve: The tricuspid valve is structurally normal. No evidence of tricuspid regurgitation. The right ventricular systolic pressure is unable to be  estimated.  Pulmonic Valve: The pulmonic valve is structurally normal. There is no indication of pulmonic valve regurgitation.  Pericardium: Trivial pericardial effusion.  Aorta: The aortic root is normal. The Asc Ao is 2.90 cm. There is no dilatation of the ascending aorta.  Systemic Veins: The inferior vena cava appears normal in size, with IVC inspiratory collapse greater than 50%.  In comparison to the previous echocardiogram(s): There are no prior studies on this patient for comparison purposes.       CONCLUSIONS:   1. Left ventricular ejection fraction is normal, by visual estimate at 65-70%.   2. There is normal right ventricular global systolic function.    QUANTITATIVE DATA SUMMARY:     2D MEASUREMENTS:          Normal Ranges:  Ao Root d:       3.00 cm  (2.0-3.7cm)  LAs:             3.80 cm  (2.7-4.0cm)  IVSd:            1.00 cm  (0.6-1.1cm)  LVPWd:           0.80 cm  (0.6-1.1cm)  LVIDd:           4.30 cm  (3.9-5.9cm)  LVIDs:           2.60 cm  LV Mass Index:   60 g/m2  LVEDV Index:     41 ml/m2  LV % FS          39.5 %       LEFT ATRIUM:                  Normal Ranges:  LA Vol A4C:        43.6 ml    (22+/-6mL/m2)  LA Vol A2C:        34.6 ml  LA Vol BP:         40.1 ml  LA Vol Index A4C:  21.1ml/m2  LA Vol Index A2C:  16.8 ml/m2  LA Vol Index BP:   19.4 ml/m2  LA Area A4C:       15.1 cm2  LA Area A2C:       13.9 cm2  LA Major Axis A4C: 4.4 cm  LA Major Axis A2C: 4.7 cm  LA Volume Index:   19.4 ml/m2       RIGHT ATRIUM:         Normal Ranges:  RA Area A4C:  9.3 cm2       AORTA MEASUREMENTS:         Normal Ranges:  Asc Ao, d:          2.90 cm (2.1-3.4cm)       LV SYSTOLIC FUNCTION:                       Normal Ranges:  EF-A4C View:    73 % (>=55%)  EF-A2C View:    62 %  EF-Biplane:     68 %  EF-Visual:      68 %  LV EF Reported: 68 %       LV DIASTOLIC FUNCTION:             Normal Ranges:  MV Peak E:             0.82 m/s    (0.7-1.2 m/s)  MV Peak A:             0.68 m/s    (0.42-0.7 m/s)  E/A Ratio:              1.22        (1.0-2.2)  MV e'                  0.108 m/s   (>8.0)  MV lateral e'          0.13 m/s  MV medial e'           0.09 m/s  MV A Dur:              116.00 msec  E/e' Ratio:            7.59        (<8.0)  MV DT:                 180 msec    (150-240 msec)       MITRAL VALVE:          Normal Ranges:  MV DT:        180 msec (150-240msec)       AORTIC VALVE:           Normal Ranges:  AoV Vmax:      1.24 m/s (<=1.7m/s)  AoV Peak P.2 mmHg (<20mmHg)  LVOT Max Jl:  1.01 m/s (<=1.1m/s)  LVOT VTI:      21.60 cm  LVOT Diameter: 2.20 cm  (1.8-2.4cm)  AoV Area,Vmax: 3.10 cm2 (2.5-4.5cm2)       RIGHT VENTRICLE:  RV Basal 2.70 cm  RV Mid   2.00 cm  RV Major 6.8 cm  TAPSE:   23.2 mm  RV s'    0.10 m/s       TRICUSPID VALVE/RVSP:         Normal Ranges:  IVC Diam:             1.63 cm       PULMONIC VALVE:          Normal Ranges:  PV Max Jl:     0.8 m/s  (0.6-0.9m/s)  PV Max P.8 mmHg       [id number] Wanda Wang MD  Electronically signed on 2025 at 12:10:13 PM       ** Final **  ECG 12 lead  Normal sinus rhythm  Normal ECG  When compared with ECG of 31-DEC-2023 16:11,  Vent. rate has decreased BY  47 BPM  See ED provider note for full interpretation and clinical correlation  Confirmed by Lily Palomo ([id number]) on 2025 9:52:54 AM    Physical Exam  Constitutional:       General: She is not in acute distress.     Appearance: Normal appearance. She is obese. She is not ill-appearing or diaphoretic.   HENT:      Head: Normocephalic and atraumatic.      Nose: No congestion or rhinorrhea.   Eyes:      General: No scleral icterus.     Extraocular Movements: Extraocular movements intact.      Conjunctiva/sclera: Conjunctivae normal.      Pupils: Pupils are equal, round, and reactive to light.   Cardiovascular:      Rate and Rhythm: Normal rate and regular rhythm.      Heart sounds: No murmur heard.     No friction rub. No gallop.   Pulmonary:      Effort: Pulmonary effort is normal. No respiratory distress.       Breath sounds: No wheezing, rhonchi or rales.      Comments: Diminished breath sounds in R middle and superior lobes.  Chest:      Chest wall: No tenderness.   Abdominal:      General: Abdomen is flat. Bowel sounds are normal. There is no distension.      Palpations: Abdomen is soft. There is no mass.      Tenderness: There is no abdominal tenderness. There is no guarding or rebound.   Musculoskeletal:         General: No swelling, tenderness, deformity or signs of injury.      Cervical back: Neck supple. No tenderness.      Right lower leg: No edema.      Left lower leg: No edema.   Skin:     Capillary Refill: Capillary refill takes less than 2 seconds.      Coloration: Skin is not jaundiced or pale.      Findings: No bruising, erythema, lesion or rash.      Comments: Bruise/discoloration on R lower shin 2/2 trauma.   Neurological:      General: No focal deficit present.      Mental Status: She is alert and oriented to person, place, and time.      Motor: No weakness.      Gait: Gait normal.   Psychiatric:         Mood and Affect: Mood normal.         Behavior: Behavior normal.         Thought Content: Thought content normal.         Judgment: Judgment normal.     Medications  Scheduled medications  enoxaparin, 40 mg, subcutaneous, q24h  [Held by provider] metoprolol succinate XL, 50 mg, oral, Daily  polyethylene glycol, 17 g, oral, Daily  sennosides-docusate sodium, 1 tablet, oral, Nightly  [Held by provider] triamterene-hydrochlorothiazid, 1 tablet, oral, Daily      Continuous medications       PRN medications  PRN medications: dextromethorphan-guaifenesin, melatonin      Assessment/Plan     Tatiana Prabhakar is a 34 y.o. 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 Chronic histoplasmosis also a consideration with remote history of positive urine antigen and at that time her provider did not recommend any treatment. The hilar mass has demonstrated compression of her SVC since 2021 although patient is currently asymptomatic beyond coughing. Historically, patient does note episodes of dizziness and shortness of breath, as on admission, consistent with SVC compression but denies changes in size/swelling of her face, neck, and upper extremities. CT revealed lack of opacification of RUL pulmonary artery with c/f thrombus/embolus but DVT US negative for thrombus.     Updates 5/3/2025:  - plan for IR Stent by IR tomorrow Monday, 5/5/25- NPO @ midnight     #Chronic R Hilar Mass  #Fibrosing mediastinitis +/- sarcoidosis  - Pt states significant weight loss of ~50Ibs over 12 months but large intentional component.  - No significant smoking hx other than Marijuana. Denies any Vape pen use  - Family Hx of sarcoidosis in mom (lung and brain) who was diagnosed 1-2 years ago with symptom onset 6 years ago of dizziness and headaches. Her mom is currently on methotrexate and gets ?? infusions  - CT A/P significant for sequela of prior granulomatous disease in the liver and spleen    PLAN:  * Pulm recs:  - IR for SVC stenting on Monday.   - No plan to start Rituximab therapy inpatient; will need outpatient follow up with pulmonology     #Opacification of RUL pulmonary artery  - US w/ Duplex negative for DVT  - Echo unremarkable.    PLAN:  * D/c heparin with low concern for thrombus per pulm  * Lovenox for DVT prophylaxis  * Can consider d/c of telemetry, continuous pulse ox    #Persistent cough  #Chest tightness, resolved  #Chronic SVC syndrome   - Currently asymptomatic except for coughing, managed by robitussin  - No ACS concerns given nl EKG, trops  - Hx of recent respiratory infection with respiratory panel negative  PLAN:  * Can consider dexamethasone if current symptoms  progress  * Can consider airway clearance with RT    #HTN   #Hx of Orthostatic HoTN   - Previously on Amlodipine which was d/meg w/ start of complaint of positional HoTN abt 1 yr ago  PLAN:  * Hold metop succ 50 mg daily. Hold for SBP <120, HR <60  * Hold triamterene-HCTZ 37.5-25 daily. Hold for SBP <120, HR <60  * Monitor pressures    #Chronic Constipation  #Intermittent red blood with BM  - Since childhood. Has required enemas in hospital and at home  - Associated with straining, likely etiology anal fissure vs hemorrhoid, less likely diverticula  - Drop in Hgb at OSH to 10.4 but now back to baseline at 12.0  PLAN:  * C/w Miralax, Doc-Senna daily  * Assess BM for blood  * Daily CBCs  * Hold off on repeat colonoscopy at this time    #Obesity   #MICHELLE   - On phentermine at home for weight loss  - Last sleep study in 2019 and repeat ordered and currently pending  - Not on CPAP at home  PLAN:  * Will hold Phentermine whilst inpt     #Anxiety  #Insomnia   - Pt has anxiety about the prognosis of her mass  PLAN:  * C/w Melatonin 5mg PRN at bedtime  * Consider Atarax 10mg PRN    Dispo: To home, pending SVC stenting by IR.    F: PRN  E: Replete PRN for K>3.5, Mg>1.8  N: Regular diet  A: PIV x 2  O2: none  Abx: none  GI: Miralax, docusate, sennosides  DVT: Lovenox     Code: Full code  SDM: Yi Neal (Mother, [phone])    Brendan Carson MD   PGY-1, Internal Medicine Resident  [facility]

## 2025-05-05 LAB
ALBUMIN SERPL BCP-MCNC: 3.7 G/DL (ref 3.4–5)
ANION GAP SERPL CALC-SCNC: 13 MMOL/L (ref 10–20)
APTT PPP: 32 SECONDS (ref 26–36)
BASOPHILS # BLD AUTO: 0.05 X10*3/UL (ref 0–0.1)
BASOPHILS NFR BLD AUTO: 0.9 %
BUN SERPL-MCNC: 12 MG/DL (ref 6–23)
CALCIUM SERPL-MCNC: 8.7 MG/DL (ref 8.6–10.6)
CHLORIDE SERPL-SCNC: 105 MMOL/L (ref 98–107)
CO2 SERPL-SCNC: 24 MMOL/L (ref 21–32)
CREAT SERPL-MCNC: 0.96 MG/DL (ref 0.5–1.05)
EGFRCR SERPLBLD CKD-EPI 2021: 79 ML/MIN/1.73M*2
EOSINOPHIL # BLD AUTO: 0.18 X10*3/UL (ref 0–0.7)
EOSINOPHIL NFR BLD AUTO: 3.2 %
ERYTHROCYTE [DISTWIDTH] IN BLOOD BY AUTOMATED COUNT: 13 % (ref 11.5–14.5)
GLUCOSE SERPL-MCNC: 80 MG/DL (ref 74–99)
HCT VFR BLD AUTO: 33.9 % (ref 36–46)
HGB BLD-MCNC: 10.5 G/DL (ref 12–16)
IMM GRANULOCYTES # BLD AUTO: 0.02 X10*3/UL (ref 0–0.7)
IMM GRANULOCYTES NFR BLD AUTO: 0.4 % (ref 0–0.9)
INR PPP: 1 (ref 0.9–1.1)
LYMPHOCYTES # BLD AUTO: 1.61 X10*3/UL (ref 1.2–4.8)
LYMPHOCYTES NFR BLD AUTO: 29 %
MAGNESIUM SERPL-MCNC: 1.89 MG/DL (ref 1.6–2.4)
MCH RBC QN AUTO: 27.7 PG (ref 26–34)
MCHC RBC AUTO-ENTMCNC: 31 G/DL (ref 32–36)
MCV RBC AUTO: 89 FL (ref 80–100)
MONOCYTES # BLD AUTO: 0.3 X10*3/UL (ref 0.1–1)
MONOCYTES NFR BLD AUTO: 5.4 %
NEUTROPHILS # BLD AUTO: 3.39 X10*3/UL (ref 1.2–7.7)
NEUTROPHILS NFR BLD AUTO: 61.1 %
NRBC BLD-RTO: 0 /100 WBCS (ref 0–0)
PHOSPHATE SERPL-MCNC: 3.9 MG/DL (ref 2.5–4.9)
PLATELET # BLD AUTO: 250 X10*3/UL (ref 150–450)
POTASSIUM SERPL-SCNC: 3.8 MMOL/L (ref 3.5–5.3)
PROTHROMBIN TIME: 11.2 SECONDS (ref 9.8–12.4)
RBC # BLD AUTO: 3.79 X10*6/UL (ref 4–5.2)
SODIUM SERPL-SCNC: 138 MMOL/L (ref 136–145)
WBC # BLD AUTO: 5.6 X10*3/UL (ref 4.4–11.3)

## 2025-05-05 PROCEDURE — 99232 SBSQ HOSP IP/OBS MODERATE 35: CPT

## 2025-05-05 PROCEDURE — 36415 COLL VENOUS BLD VENIPUNCTURE: CPT

## 2025-05-05 PROCEDURE — 83735 ASSAY OF MAGNESIUM: CPT

## 2025-05-05 PROCEDURE — 85025 COMPLETE CBC W/AUTO DIFF WBC: CPT

## 2025-05-05 PROCEDURE — 80069 RENAL FUNCTION PANEL: CPT

## 2025-05-05 PROCEDURE — 99255 IP/OBS CONSLTJ NEW/EST HI 80: CPT | Performed by: NURSE PRACTITIONER

## 2025-05-05 PROCEDURE — 85730 THROMBOPLASTIN TIME PARTIAL: CPT

## 2025-05-05 PROCEDURE — 86481 TB AG RESPONSE T-CELL SUSP: CPT

## 2025-05-05 PROCEDURE — 1170000001 HC PRIVATE ONCOLOGY ROOM DAILY

## 2025-05-05 ASSESSMENT — COGNITIVE AND FUNCTIONAL STATUS - GENERAL
MOBILITY SCORE: 24
DAILY ACTIVITIY SCORE: 24
DAILY ACTIVITIY SCORE: 24
MOBILITY SCORE: 24

## 2025-05-05 ASSESSMENT — ENCOUNTER SYMPTOMS
DIZZINESS: 1
PSYCHIATRIC NEGATIVE: 1
GASTROINTESTINAL NEGATIVE: 1
COUGH: 1
CONSTITUTIONAL NEGATIVE: 1
MUSCULOSKELETAL NEGATIVE: 1

## 2025-05-05 ASSESSMENT — PAIN SCALES - GENERAL
PAINLEVEL_OUTOF10: 0 - NO PAIN
PAINLEVEL_OUTOF10: 0 - NO PAIN

## 2025-05-05 ASSESSMENT — PAIN - FUNCTIONAL ASSESSMENT
PAIN_FUNCTIONAL_ASSESSMENT: 0-10
PAIN_FUNCTIONAL_ASSESSMENT: 0-10

## 2025-05-05 NOTE — PROGRESS NOTES
Subjective   Pt seen, doing well. Plan for SVC stenting with IR today  No fevers, chills, chest pain, or dyspnea.  NAEON.      Objective   Vitals  Last Recorded Vitals  /79 (BP Location: Right arm, Patient Position: Lying)   Pulse 79   Temp 37 °C (98.6 °F) (Temporal)   Resp 16   Wt 90.7 kg (200 lb)   SpO2 100%     I/Os  Intake/Output last 3 Shifts:  No intake or output data in the 24 hours ending 05/05/25 0842      Admission Weight  Weight: 90.7 kg (200 lb) (05/01/25 1954)    Daily Weight  05/01/25 : 90.7 kg (200 lb)    Lab Trends  Results from last 72 hours   Lab Units 05/05/25  0616 05/04/25  0802 05/03/25  0800   WBC AUTO x10*3/uL 5.6 5.2 4.6   HEMOGLOBIN g/dL 10.5* 11.0* 12.0   PLATELETS AUTO x10*3/uL 250 310 342   SODIUM mmol/L  --  139 138   POTASSIUM mmol/L  --  3.5 3.4*   CO2 mmol/L  --  27 24   BUN mg/dL  --  12 10   MAGNESIUM mg/dL  --  1.86 1.97   GLUCOSE mg/dL  --  78 95      Current Labs  Results for orders placed or performed during the hospital encounter of 05/01/25 (from the past 24 hours)   CBC and Auto Differential   Result Value Ref Range    WBC 5.6 4.4 - 11.3 x10*3/uL    nRBC 0.0 0.0 - 0.0 /100 WBCs    RBC 3.79 (L) 4.00 - 5.20 x10*6/uL    Hemoglobin 10.5 (L) 12.0 - 16.0 g/dL    Hematocrit 33.9 (L) 36.0 - 46.0 %    MCV 89 80 - 100 fL    MCH 27.7 26.0 - 34.0 pg    MCHC 31.0 (L) 32.0 - 36.0 g/dL    RDW 13.0 11.5 - 14.5 %    Platelets 250 150 - 450 x10*3/uL    Neutrophils % 61.1 40.0 - 80.0 %    Immature Granulocytes %, Automated 0.4 0.0 - 0.9 %    Lymphocytes % 29.0 13.0 - 44.0 %    Monocytes % 5.4 2.0 - 10.0 %    Eosinophils % 3.2 0.0 - 6.0 %    Basophils % 0.9 0.0 - 2.0 %    Neutrophils Absolute 3.39 1.20 - 7.70 x10*3/uL    Immature Granulocytes Absolute, Automated 0.02 0.00 - 0.70 x10*3/uL    Lymphocytes Absolute 1.61 1.20 - 4.80 x10*3/uL    Monocytes Absolute 0.30 0.10 - 1.00 x10*3/uL    Eosinophils Absolute 0.18 0.00 - 0.70 x10*3/uL    Basophils Absolute 0.05 0.00 - 0.10 x10*3/uL    Coagulation Screen   Result Value Ref Range    Protime 11.2 9.8 - 12.4 seconds    INR 1.0 0.9 - 1.1    aPTT 32 26 - 36 seconds     Imaging  No results found.    Lower extremity venous duplex bilateral  Narrative: Interpreted By:  Josue Arriaga,  and Austin Maurer   STUDY:  [facility] US LOWER EXTREMITY VENOUS DUPLEX BILATERAL;  5/2/2025 10:44 am      INDICATION:  Signs/Symptoms:Pulm Art thrombus vs PE. R/o LE clot burden iso new  Lung Malignancy..      ,I26.99 Other pulmonary embolism without acute cor pulmonale      COMPARISON:  None.      ACCESSION NUMBER(S):  [id number]      ORDERING CLINICIAN:  DALIA RYDER      TECHNIQUE:  Vascular ultrasound of the bilateral lower extremities was performed.  Real-time compression views as well as Gray scale, color Doppler and  spectral Doppler waveform analysis was performed.      FINDINGS:  Evaluation of the visualized portions of the bilateral common femoral  vein, proximal, mid, and distal femoral vein, and popliteal vein were  performed.  Evaluation of the visualized portions of the  posterior  tibial and peroneal veins were also performed.      Limitations:  None.      The evaluated veins demonstrate normal compressibility. There is  intact venous flow demonstrating normal respiratory variability and  normal augmentation of flow with calf compression. Therefore, there  is no ultrasonographic evidence for deep vein thrombosis within the  evaluated veins.      Impression: No sonographic evidence for deep vein thrombosis within the evaluated  veins of the bilateral lower extremities.      I personally reviewed the images/study and I agree with the findings  as stated by resident Erasto Avila. This study was interpreted  at [place], [place].      MACRO:  None      Signed by: Josue Arriaga 5/2/2025 10:13 PM  Dictation workstation:   [id number]  CT abdomen pelvis w IV contrast  Narrative: Interpreted By:  Teddy Welsh   and Tomas Mauricio   STUDY:  CT ABDOMEN PELVIS W IV CONTRAST;  5/2/2025 12:39 pm      INDICATION:  Signs/Symptoms:evaluate for metastatic disease in abdomen/pelvis.          COMPARISON:  Multiple prior chest CTs most recently dated 05/01/2025      ACCESSION NUMBER(S):  [id number]      ORDERING CLINICIAN:  DALIA RYDER      TECHNIQUE:  Contiguous axial images of the abdomen and pelvis were obtained after  the intravenous administration of 75 mL Omnipaque 350 contrast.  Coronal and sagittal reformatted images were reconstructed from the  axial data.      FINDINGS:  LOWER CHEST: Partially visualized consolidative opacity of the right  middle lobe better evaluated on the CT chest dated 05/01/2025.  Otherwise, the lung bases are clear.          ABDOMEN/PELVIS:      ABDOMINAL WALL: No significant abnormality.      LIVER: The liver is normal in size with scattered parenchymal  calcifications.      BILE DUCTS: No significant intrahepatic or extrahepatic dilatation.      GALLBLADDER: Cholelithiasis without evidence of acute cholecystitis.      PANCREAS: No significant abnormality.      SPLEEN: The spleen is normal in size with multiple parenchymal  calcifications.      ADRENALS: No significant abnormality.      KIDNEYS, URETERS, BLADDER: Kidneys are similar in size and  enhancement. No hydroureteronephrosis or nephroureterolithiasis. The  urinary bladder is decompressed, limited for evaluation.      REPRODUCTIVE ORGANS: The uterus is retroverted. No adnexal masses.      VESSELS: Abdominal aorta and IVC are unremarkable.      RETROPERITONEUM/LYMPH NODES: No enlarged lymph nodes.      BOWEL/MESENTERY/PERITONEUM: The stomach is decompressed, limited  evaluation. No inflammatory bowel wall thickening or dilatation.  Normal appendix.      No ascites, free air, or fluid collection.          MUSCULOSKELETAL: No acute osseous abnormality.      Impression: 1. No definite evidence of metastatic disease in the abdomen or  pelvis.  2.  Sequela of prior granulomatous disease in the liver and spleen.  3. Please refer to the prior CT dated 05/01/2025 for better  evaluation consolidative opacities of the right middle lobe.      I personally reviewed the image(s) / study and I agree with the  findings as stated by Hang Marin MD. This study was interpreted at  [place], [place].      MACRO:  None.      Signed by: Teddy Welsh 5/2/2025 3:35 PM  Dictation workstation:   [id number]  Transthoracic Echo Complete     [facility], [address] [place] [address]                 Tel [phone] and Fax [phone]    TRANSTHORACIC ECHOCARDIOGRAM REPORT       Patient Name:       LAILA CAVAZOS      Reading Physician:    [id number] Wanda Wang MD  Study Date:         5/2/2025            Ordering Provider:    [id number] DALIA RYDER  MRN/PID:            [id number]            Fellow:  Accession#:         [id number]        Nurse:  Date of Birth/Age:  1990 / 34      Sonographer:          JOSE Altman RDCS  Gender assigned at  F                   Additional Staff:  Birth:  Height:             175.26 cm           Admit Date:           5/1/2025  Weight:             90.72 kg            Admission Status:     Inpatient -                                                                Routine  BSA / BMI:          2.07 m2 / 29.54     Encounter#:           [id number]                      kg/m2  Blood Pressure:     115/77 mmHg         Department Location:  [facility]                                                                Non Invasive    Study Type:    TRANSTHORACIC ECHO (TTE) COMPLETE  Diagnosis/ICD: Other pulmonary embolism without acute cor pulmonale-I26.99  Indication:    Pulmonary Embolus  CPT Code:      Echo  Complete w Full Doppler-[id number]    Patient History:  Pertinent History: HTN and Dyspnea. SVC obstruction, MICHELLE on CPAP, CKD.    Study Detail: The following Echo studies were performed: 2D, M-Mode, Doppler and                color flow.       PHYSICIAN INTERPRETATION:  Left Ventricle: Left ventricular ejection fraction is normal, by visual estimate at 65-70%. There are no regional left ventricular wall motion abnormalities. The left ventricular cavity size is normal. There is mildly increased septal and normal posterior left ventricular wall thickness. Spectral Doppler shows a normal pattern of left ventricular diastolic filling.  Left Atrium: The left atrial size is normal.  Right Ventricle: The right ventricle is normal in size. There is normal right ventricular global systolic function.  Right Atrium: The right atrium is normal in size.  Aortic Valve: The aortic valve is trileaflet. There is no evidence of aortic valve regurgitation. The peak instantaneous gradient of the aortic valve is 6 mmHg.  Mitral Valve: The mitral valve is normal in structure. There is no evidence of mitral valve regurgitation.  Tricuspid Valve: The tricuspid valve is structurally normal. No evidence of tricuspid regurgitation. The right ventricular systolic pressure is unable to be estimated.  Pulmonic Valve: The pulmonic valve is structurally normal. There is no indication of pulmonic valve regurgitation.  Pericardium: Trivial pericardial effusion.  Aorta: The aortic root is normal. The Asc Ao is 2.90 cm. There is no dilatation of the ascending aorta.  Systemic Veins: The inferior vena cava appears normal in size, with IVC inspiratory collapse greater than 50%.  In comparison to the previous echocardiogram(s): There are no prior studies on this patient for comparison purposes.       CONCLUSIONS:   1. Left ventricular ejection fraction is normal, by visual estimate at 65-70%.   2. There is normal right ventricular global systolic  function.    QUANTITATIVE DATA SUMMARY:     2D MEASUREMENTS:          Normal Ranges:  Ao Root d:       3.00 cm  (2.0-3.7cm)  LAs:             3.80 cm  (2.7-4.0cm)  IVSd:            1.00 cm  (0.6-1.1cm)  LVPWd:           0.80 cm  (0.6-1.1cm)  LVIDd:           4.30 cm  (3.9-5.9cm)  LVIDs:           2.60 cm  LV Mass Index:   60 g/m2  LVEDV Index:     41 ml/m2  LV % FS          39.5 %       LEFT ATRIUM:                  Normal Ranges:  LA Vol A4C:        43.6 ml    (22+/-6mL/m2)  LA Vol A2C:        34.6 ml  LA Vol BP:         40.1 ml  LA Vol Index A4C:  21.1ml/m2  LA Vol Index A2C:  16.8 ml/m2  LA Vol Index BP:   19.4 ml/m2  LA Area A4C:       15.1 cm2  LA Area A2C:       13.9 cm2  LA Major Axis A4C: 4.4 cm  LA Major Axis A2C: 4.7 cm  LA Volume Index:   19.4 ml/m2       RIGHT ATRIUM:         Normal Ranges:  RA Area A4C:  9.3 cm2       AORTA MEASUREMENTS:         Normal Ranges:  Asc Ao, d:          2.90 cm (2.1-3.4cm)       LV SYSTOLIC FUNCTION:                       Normal Ranges:  EF-A4C View:    73 % (>=55%)  EF-A2C View:    62 %  EF-Biplane:     68 %  EF-Visual:      68 %  LV EF Reported: 68 %       LV DIASTOLIC FUNCTION:             Normal Ranges:  MV Peak E:             0.82 m/s    (0.7-1.2 m/s)  MV Peak A:             0.68 m/s    (0.42-0.7 m/s)  E/A Ratio:             1.22        (1.0-2.2)  MV e'                  0.108 m/s   (>8.0)  MV lateral e'          0.13 m/s  MV medial e'           0.09 m/s  MV A Dur:              116.00 msec  E/e' Ratio:            7.59        (<8.0)  MV DT:                 180 msec    (150-240 msec)       MITRAL VALVE:          Normal Ranges:  MV DT:        180 msec (150-240msec)       AORTIC VALVE:           Normal Ranges:  AoV Vmax:      1.24 m/s (<=1.7m/s)  AoV Peak P.2 mmHg (<20mmHg)  LVOT Max Jl:  1.01 m/s (<=1.1m/s)  LVOT VTI:      21.60 cm  LVOT Diameter: 2.20 cm  (1.8-2.4cm)  AoV Area,Vmax: 3.10 cm2 (2.5-4.5cm2)       RIGHT VENTRICLE:  RV Basal 2.70 cm  RV Mid   2.00 cm  RV  Major 6.8 cm  TAPSE:   23.2 mm  RV s'    0.10 m/s       TRICUSPID VALVE/RVSP:         Normal Ranges:  IVC Diam:             1.63 cm       PULMONIC VALVE:          Normal Ranges:  PV Max Jl:     0.8 m/s  (0.6-0.9m/s)  PV Max P.8 mmHg       [id number] Wanda Wang MD  Electronically signed on 2025 at 12:10:13 PM       ** Final **  ECG 12 lead  Normal sinus rhythm  Normal ECG  When compared with ECG of 31-DEC-2023 16:11,  Vent. rate has decreased BY  47 BPM  See ED provider note for full interpretation and clinical correlation  Confirmed by Lily Palomo ([id number]) on 2025 9:52:54 AM    Physical Exam  Constitutional:       General: She is not in acute distress.     Appearance: Normal appearance. She is obese. She is not ill-appearing or diaphoretic.   HENT:      Head: Normocephalic and atraumatic.      Nose: No congestion or rhinorrhea.   Eyes:      General: No scleral icterus.     Extraocular Movements: Extraocular movements intact.      Conjunctiva/sclera: Conjunctivae normal.      Pupils: Pupils are equal, round, and reactive to light.   Cardiovascular:      Rate and Rhythm: Normal rate and regular rhythm.      Heart sounds: No murmur heard.     No friction rub. No gallop.   Pulmonary:      Effort: Pulmonary effort is normal. No respiratory distress.      Breath sounds: No wheezing, rhonchi or rales.      Comments: Diminished breath sounds in R middle and superior lobes.  Chest:      Chest wall: No tenderness.   Abdominal:      General: Abdomen is flat. Bowel sounds are normal. There is no distension.      Palpations: Abdomen is soft. There is no mass.      Tenderness: There is no abdominal tenderness. There is no guarding or rebound.   Musculoskeletal:         General: No swelling, tenderness, deformity or signs of injury.      Cervical back: Neck supple. No tenderness.      Right lower leg: No edema.      Left lower leg: No edema.   Skin:     Capillary Refill: Capillary refill takes less than 2  seconds.      Coloration: Skin is not jaundiced or pale.      Findings: No bruising, erythema, lesion or rash.      Comments: Bruise/discoloration on R lower shin 2/2 trauma.   Neurological:      General: No focal deficit present.      Mental Status: She is alert and oriented to person, place, and time.      Motor: No weakness.      Gait: Gait normal.   Psychiatric:         Mood and Affect: Mood normal.         Behavior: Behavior normal.         Thought Content: Thought content normal.         Judgment: Judgment normal.     Medications  Scheduled medications  [Held by provider] metoprolol succinate XL, 50 mg, oral, Daily  polyethylene glycol, 17 g, oral, Daily  sennosides-docusate sodium, 1 tablet, oral, Nightly  [Held by provider] triamterene-hydrochlorothiazid, 1 tablet, oral, Daily      Continuous medications       PRN medications  PRN medications: dextromethorphan-guaifenesin, melatonin      Assessment/Plan     Tatiana Prabhakar is a 34 y.o. 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 Chronic histoplasmosis also a consideration with remote history of positive urine antigen and at that time her provider did not recommend any treatment. The hilar mass has demonstrated compression of her SVC since 2021 although patient is currently asymptomatic beyond coughing. Historically, patient does note episodes of dizziness and shortness of breath, as on admission, consistent with SVC compression but denies changes in size/swelling of her face, neck, and upper extremities. CT revealed lack of opacification of RUL pulmonary artery with c/f thrombus/embolus but DVT US negative for thrombus.     Updates 5/5/2025:  - plan for SVC stent with IR today  - will discuss  with pulm if there is any need to biopsy R hilar mass or PET CT scan         #Chronic R Hilar Mass  #Fibrosing mediastinitis +/- sarcoidosis  - Pt states significant weight loss of ~50Ibs over 12 months but large intentional component.  - No significant smoking hx other than Marijuana. Denies any Vape pen use  - Family Hx of sarcoidosis in mom (lung and brain) who was diagnosed 1-2 years ago with symptom onset 6 years ago of dizziness and headaches. Her mom is currently on methotrexate and gets ?? infusions  - CT A/P significant for sequela of prior granulomatous disease in the liver and spleen    PLAN:  * Pulm recs:  - IR for SVC stenting today  - No plan to start Rituximab therapy inpatient; will need outpatient follow up with pulmonology   - will discuss with pulm if there is any need to biopsy R hilar mass or PET CT scan     #Opacification of RUL pulmonary artery  - US w/ Duplex negative for DVT  - Echo unremarkable.    PLAN:  * D/c heparin with low concern for thrombus per pulm  * Lovenox for DVT prophylaxis  * Can consider d/c of telemetry, continuous pulse ox    #Persistent cough  #Chest tightness, resolved  #Chronic SVC syndrome   - Currently asymptomatic except for coughing, managed by robitussin  - No ACS concerns given nl EKG, trops  - Hx of recent respiratory infection with respiratory panel negative  PLAN:  * Can consider dexamethasone if current symptoms progress  * Can consider airway clearance with RT    #HTN   #Hx of Orthostatic HoTN   - Previously on Amlodipine which was d/meg w/ start of complaint of positional HoTN abt 1 yr ago  PLAN:  * Hold metop succ 50 mg daily. Hold for SBP <120, HR <60  * Hold triamterene-HCTZ 37.5-25 daily. Hold for SBP <120, HR <60  * Monitor pressures    #Chronic Constipation  #Intermittent red blood with BM  - Since childhood. Has required enemas in hospital and at home  - Associated with straining, likely etiology anal fissure vs hemorrhoid, less likely diverticula  -  Drop in Hgb at OSH to 10.4 but now back to baseline at 12.0  PLAN:  * C/w Miralax, Doc-Senna daily  * Assess BM for blood  * Daily CBCs  * Hold off on repeat colonoscopy at this time    #Obesity   #MICHELLE   - On phentermine at home for weight loss  - Last sleep study in 2019 and repeat ordered and currently pending  - Not on CPAP at home  PLAN:  * Will hold Phentermine whilst inpt     #Anxiety  #Insomnia   - Pt has anxiety about the prognosis of her mass  PLAN:  * C/w Melatonin 5mg PRN at bedtime  * Consider Atarax 10mg PRN    Dispo: To home, pending SVC stenting by IR.    F: PRN  E: Replete PRN for K>3.5, Mg>1.8  N: Regular diet  A: PIV x 2  O2: none  Abx: none  GI: Miralax, docusate, sennosides  DVT: Lovenox     Code: Full code  SDM: Yi Neal (Mother, [phone])      Pt was seen and discussed with Dr Suzanne Stevenson MD  PGY1 Neurology

## 2025-05-05 NOTE — SIGNIFICANT EVENT
Primary team reached out regarding the need for biopsy on the right hilar calcified mass. CT scans reviewed. Radiological picture is consistent with fibrosing mediastinitis , no indication for biopsy at the moment. Patient is scheduled to follow up with pulmonary clinic on 5/30/2025.    Massiel Valdez MD

## 2025-05-05 NOTE — CONSULTS
INTERVENTIONAL RADIOLOGY INPATIENT CONSULT NOTE  [facility]    Assessment & Plan     Review of pertinent imaging demonstrates significant SVC narrowing. R hilar mass with calcifications.    Plan: At bedside we discussed SVC stenting in the context of SVC narrowing. Despite lack of symptoms at time of assessment imaging does demonstrate significant narrowing. Plan to perform SVC stenting, case will require anesthesia. Recommend vascular consultation as well for management of OAC prior and post stenting.     Subjective  Tatiana Prabhakar, 34 y.o. 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 female w/a PMH of HTN, MICHELLE (not on CPAP), Chronic Constipation and Obesity who initially presented to [facility] w/ complaints of chest tightness, cough and SOB. Less c/f ACS given negative trops and unremarkable EKG findings. CXR showed suspicious 2cm RLL nodule c/f malignancy and A CT PE done showed 3.6 x 3.8 x 3.3 cm lobulated R hilar mass suspected to be malignant. Further findings of R Pulm art flow defect c/f thrombus/emboli and severe flow restriction to the SVC. She was then started on Heparin gtt and transferred to [facility] for continued management and malignancy w/up.    Patient states that she has been having dizzy spells when bending over recently, denies any subjective swelling of face, neck, or arms. Denies SOB and states that she has been exercising 4x week without any symptoms.        Review of Systems   Constitutional: Negative.    Respiratory:  Positive for cough.         Productive cough   Cardiovascular:  Negative for chest pain.   Gastrointestinal: Negative.    Musculoskeletal: Negative.    Skin: Negative.    Neurological:  Positive for dizziness.   Psychiatric/Behavioral: Negative.         Medical History[1]  Surgical History[2]  Social History     Socioeconomic History    Marital status:      Spouse name: Not on file    Number  of children: Not on file    Years of education: Not on file    Highest education level: Not on file   Occupational History    Not on file   Tobacco Use    Smoking status: Never     Passive exposure: Never    Smokeless tobacco: Never   Vaping Use    Vaping status: Never Used   Substance and Sexual Activity    Alcohol use: Yes     Comment: Socially    Drug use: Never    Sexual activity: Yes     Partners: Male     Birth control/protection: Female Sterilization   Other Topics Concern    Not on file   Social History Narrative    Not on file     Social Drivers of Health     Financial Resource Strain: Low Risk  (5/2/2025)    Overall Financial Resource Strain (CARDIA)     Difficulty of Paying Living Expenses: Not very hard   Food Insecurity: No Food Insecurity (5/1/2025)    Hunger Vital Sign     Worried About Running Out of Food in the Last Year: Never true     Ran Out of Food in the Last Year: Never true   Transportation Needs: No Transportation Needs (5/2/2025)    PRAPARE - Transportation     Lack of Transportation (Medical): No     Lack of Transportation (Non-Medical): No   Physical Activity: Not on File (4/14/2023)    Received from [employer]    Physical Activity     Physical Activity: 0   Stress: Not on File (4/14/2023)    Received from [employer]    Stress     Stress: 0   Social Connections: Not on File (9/12/2024)    Received from [employer]    Social Connections     Connectedness: 0   Intimate Partner Violence: Not At Risk (5/1/2025)    Humiliation, Afraid, Rape, and Kick questionnaire     Fear of Current or Ex-Partner: No     Emotionally Abused: No     Physically Abused: No     Sexually Abused: No   Housing Stability: Low Risk  (5/2/2025)    Housing Stability Vital Sign     Unable to Pay for Housing in the Last Year: No     Number of Times Moved in the Last Year: 1     Homeless in the Last Year: No     Family History[3]  Allergies[4]  Medications Ordered Prior to Encounter[5]    Objective    Vitals:    05/04/25 2004 05/04/25 5904  05/05/25 0508 05/05/25 0913   BP: 124/78 124/80 114/79 120/89   BP Location: Right arm Right arm Right arm Right arm   Patient Position: Lying Lying Lying Sitting   Pulse: 86 97 79 77   Resp: 16 16 16 16   Temp: 36.5 °C (97.7 °F) 36.7 °C (98.1 °F) 37 °C (98.6 °F) 36.9 °C (98.4 °F)   TempSrc: Temporal Temporal Temporal Temporal   SpO2: 100% 100% 100% 100%   Weight:       Height:           Results for orders placed or performed during the hospital encounter of 05/01/25 (from the past 24 hours)   CBC and Auto Differential   Result Value Ref Range    WBC 5.6 4.4 - 11.3 x10*3/uL    nRBC 0.0 0.0 - 0.0 /100 WBCs    RBC 3.79 (L) 4.00 - 5.20 x10*6/uL    Hemoglobin 10.5 (L) 12.0 - 16.0 g/dL    Hematocrit 33.9 (L) 36.0 - 46.0 %    MCV 89 80 - 100 fL    MCH 27.7 26.0 - 34.0 pg    MCHC 31.0 (L) 32.0 - 36.0 g/dL    RDW 13.0 11.5 - 14.5 %    Platelets 250 150 - 450 x10*3/uL    Neutrophils % 61.1 40.0 - 80.0 %    Immature Granulocytes %, Automated 0.4 0.0 - 0.9 %    Lymphocytes % 29.0 13.0 - 44.0 %    Monocytes % 5.4 2.0 - 10.0 %    Eosinophils % 3.2 0.0 - 6.0 %    Basophils % 0.9 0.0 - 2.0 %    Neutrophils Absolute 3.39 1.20 - 7.70 x10*3/uL    Immature Granulocytes Absolute, Automated 0.02 0.00 - 0.70 x10*3/uL    Lymphocytes Absolute 1.61 1.20 - 4.80 x10*3/uL    Monocytes Absolute 0.30 0.10 - 1.00 x10*3/uL    Eosinophils Absolute 0.18 0.00 - 0.70 x10*3/uL    Basophils Absolute 0.05 0.00 - 0.10 x10*3/uL   Renal function panel   Result Value Ref Range    Glucose 80 74 - 99 mg/dL    Sodium 138 136 - 145 mmol/L    Potassium 3.8 3.5 - 5.3 mmol/L    Chloride 105 98 - 107 mmol/L    Bicarbonate 24 21 - 32 mmol/L    Anion Gap 13 10 - 20 mmol/L    Urea Nitrogen 12 6 - 23 mg/dL    Creatinine 0.96 0.50 - 1.05 mg/dL    eGFR 79 >60 mL/min/1.73m*2    Calcium 8.7 8.6 - 10.6 mg/dL    Phosphorus 3.9 2.5 - 4.9 mg/dL    Albumin 3.7 3.4 - 5.0 g/dL   Magnesium   Result Value Ref Range    Magnesium 1.89 1.60 - 2.40 mg/dL   Coagulation Screen   Result  Value Ref Range    Protime 11.2 9.8 - 12.4 seconds    INR 1.0 0.9 - 1.1    aPTT 32 26 - 36 seconds       MELD 3.0: 9 at 5/3/2025  8:00 AM  MELD-Na: 7 at 5/3/2025  8:00 AM  Calculated from:  Serum Creatinine: 1.03 mg/dL at 5/3/2025  8:00 AM  Serum Sodium: 138 mmol/L (Using max of 137 mmol/L) at 5/3/2025  8:00 AM  Total Bilirubin: 0.4 mg/dL (Using min of 1 mg/dL) at 5/1/2025  3:51 AM  Serum Albumin: 4.2 g/dL (Using max of 3.5 g/dL) at 5/2/2025  8:35 AM  INR(ratio): 1.1 at 5/3/2025  7:59 AM  Age at listing (hypothetical): 34 years  Sex: Female at 5/3/2025  8:00 AM      Physical Exam  Constitutional:       Appearance: Normal appearance.   Cardiovascular:      Pulses: Normal pulses.      Heart sounds: Normal heart sounds.   Pulmonary:      Effort: Pulmonary effort is normal.      Breath sounds: Normal breath sounds.   Abdominal:      General: Bowel sounds are normal.      Palpations: Abdomen is soft.   Musculoskeletal:         General: Normal range of motion.      Cervical back: Normal range of motion and neck supple.   Skin:     General: Skin is warm and dry.      Capillary Refill: Capillary refill takes less than 2 seconds.   Neurological:      Mental Status: She is alert and oriented to person, place, and time.   Psychiatric:         Mood and Affect: Mood normal.         Behavior: Behavior normal.         Thought Content: Thought content normal.         Judgment: Judgment normal.             I personally spent 80 minutes on this consult with over 50% of time spent discussing management and care of specified diagnosis with patient and/or coordinating care for this patient.       Vascular and Interventional Radiology  [facility]  [phone] Inpatient Nursing Quarterback  [phone] Nursing Line 7a-5p  [phone] Resident on-call pager    NON-Urgent on call weekends and after hours weekdays (5pm - 5am) IR pager: [phone]  Urgent & emergent on call weekends and after hours weekdays (5pm-7am)  IR pager: [phone]          [1]   Past Medical History:  Diagnosis Date    Blepharochalasis left eye, unspecified eyelid 11/19/2018    Blepharochalasis of left eye    Blepharochalasis left upper eyelid 04/07/2017    Blepharochalasis left upper eyelid    Body mass index (BMI) 38.0-38.9, adult 12/29/2016    BMI 38.0-38.9,adult    Chronic sinusitis, unspecified 12/18/2017    Other sinusitis    Edema of left upper eyelid 04/07/2017    Edema of left upper eyelid    Encounter for gynecological examination (general) (routine) without abnormal findings 05/18/2017    Encounter for annual routine gynecological examination    Encounter for routine postpartum follow-up 02/07/2017    Postpartum care following vaginal delivery    Essential (primary) hypertension 02/07/2017    Benign essential hypertension    Hypertension     Impacted cerumen, left ear 10/13/2017    Impacted cerumen of left ear    Localized edema 10/30/2014    Periorbital edema    Low back pain, unspecified 04/18/2018    Dorsalgia of lumbar region    Obstructive sleep apnea (adult) (pediatric) 10/01/2019    Obstructive sleep apnea of adult    Obstructive sleep apnea (adult) (pediatric) 12/04/2014    Obstructive sleep apnea    Other conditions influencing health status     Excessive mucus secretion    Other conditions influencing health status     History of recent childbirth    Personal history of other diseases of the circulatory system 10/28/2015    History of cardiac murmur    Personal history of other diseases of the nervous system and sense organs 04/07/2017    History of papilledema    Personal history of other diseases of the nervous system and sense organs 09/23/2015    History of papilledema    Personal history of other diseases of the respiratory system 06/02/2020    History of allergic rhinitis    Personal history of other endocrine, nutritional and metabolic disease 10/30/2015    History of hypokalemia    Personal history of other specified conditions  08/01/2016    History of headache    Personal history of other specified conditions 04/07/2017    History of headache    Personal history of other specified conditions 05/18/2017    History of abdominal pain    Polyp of cervix uteri 03/17/2016    Cervical polyp    Pure hypercholesterolemia, unspecified 02/20/2015    Low-density-lipoid-type (LDL) hyperlipoproteinemia    Scotoma of blind spot area, left eye 04/07/2017    Enlarged blind spot of left eye    Sleep apnea     Supervision of pregnancy with other poor reproductive or obstetric history, first trimester ([facility]) 12/29/2016    History of pre-eclampsia in prior pregnancy, currently pregnant, first trimester    Unspecified acute lower respiratory infection 12/18/2017    Lower respiratory infection (e.g., bronchitis, pneumonia, pneumonitis, pulmonitis)    Unspecified asthma, uncomplicated ([facility]) 06/02/2020    Asthmatic bronchitis    Unspecified visual disturbance 04/07/2017    Transient vision disturbance   [2]   Past Surgical History:  Procedure Laterality Date    OTHER SURGICAL HISTORY  02/07/2017    Tubal Ligation After Vaginal Delivery (Same Hospitalization)   [3]   Family History  Problem Relation Name Age of Onset    Ovarian cancer Mother      Diabetes Father      Hypertension Father      Diabetes Other grandparent     Stroke Other grandparent     Breast cancer Neg Hx      Colon cancer Neg Hx     [4] No Known Allergies  [5]   No current facility-administered medications on file prior to encounter.     Current Outpatient Medications on File Prior to Encounter   Medication Sig Dispense Refill    metoprolol succinate XL (Toprol-XL) 50 mg 24 hr tablet Take 1 tablet (50 mg) by mouth once daily. Do not crush or chew. 90 tablet 3    triamterene-hydrochlorothiazid (Maxzide-25) 37.5-25 mg tablet Take 1 tablet by mouth once daily. 90 tablet 3    blood pressure test kit-large kit 1 device used daily for blood pressure monitoring 1 each 0     multivit,calc,mins/iron/folic (ONE-A-DAY WOMENS FORMULA ORAL) Take 1 tablet by mouth once daily.      phentermine 37.5 mg capsule Take 1 capsule (37.5 mg) by mouth once daily in the morning. Take before meals. 30 capsule 0    [START ON 5/23/2025] phentermine 37.5 mg capsule Take 1 capsule (37.5 mg) by mouth once daily in the morning. Take before meals. Do not fill before May 23, 2025. (Patient not taking: Reported on 5/2/2025 Do not fill before May 23, 2025.) 30 capsule 0    [START ON 6/20/2025] phentermine 37.5 mg capsule Take 1 capsule (37.5 mg) by mouth once daily in the morning. Take before meals. Do not fill before June 20, 2025. (Patient not taking: Reported on 5/2/2025 Do not fill before June 20, 2025.) 30 capsule 0

## 2025-05-06 ENCOUNTER — PHARMACY VISIT (OUTPATIENT)
Dept: PHARMACY | Facility: CLINIC | Age: 35
End: 2025-05-06
Payer: MEDICAID

## 2025-05-06 VITALS
DIASTOLIC BLOOD PRESSURE: 82 MMHG | WEIGHT: 200 LBS | HEART RATE: 76 BPM | BODY MASS INDEX: 29.62 KG/M2 | SYSTOLIC BLOOD PRESSURE: 117 MMHG | OXYGEN SATURATION: 100 % | RESPIRATION RATE: 16 BRPM | HEIGHT: 69 IN | TEMPERATURE: 98.4 F

## 2025-05-06 LAB
ACE SERPL-CCNC: 70 U/L (ref 16–85)
ALBUMIN SERPL BCP-MCNC: 3.7 G/DL (ref 3.4–5)
ANION GAP SERPL CALC-SCNC: 11 MMOL/L (ref 10–20)
APTT PPP: 31 SECONDS (ref 26–36)
BASOPHILS # BLD AUTO: 0.04 X10*3/UL (ref 0–0.1)
BASOPHILS NFR BLD AUTO: 0.7 %
BUN SERPL-MCNC: 12 MG/DL (ref 6–23)
CALCIUM SERPL-MCNC: 8.7 MG/DL (ref 8.6–10.6)
CHLORIDE SERPL-SCNC: 107 MMOL/L (ref 98–107)
CO2 SERPL-SCNC: 24 MMOL/L (ref 21–32)
CREAT SERPL-MCNC: 0.93 MG/DL (ref 0.5–1.05)
EGFRCR SERPLBLD CKD-EPI 2021: 82 ML/MIN/1.73M*2
EOSINOPHIL # BLD AUTO: 0.17 X10*3/UL (ref 0–0.7)
EOSINOPHIL NFR BLD AUTO: 2.9 %
ERYTHROCYTE [DISTWIDTH] IN BLOOD BY AUTOMATED COUNT: 13.1 % (ref 11.5–14.5)
GLUCOSE SERPL-MCNC: 82 MG/DL (ref 74–99)
HCT VFR BLD AUTO: 33.5 % (ref 36–46)
HGB BLD-MCNC: 10.4 G/DL (ref 12–16)
IMM GRANULOCYTES # BLD AUTO: 0.02 X10*3/UL (ref 0–0.7)
IMM GRANULOCYTES NFR BLD AUTO: 0.3 % (ref 0–0.9)
INR PPP: 1 (ref 0.9–1.1)
LYMPHOCYTES # BLD AUTO: 1.68 X10*3/UL (ref 1.2–4.8)
LYMPHOCYTES NFR BLD AUTO: 28.7 %
MAGNESIUM SERPL-MCNC: 1.86 MG/DL (ref 1.6–2.4)
MCH RBC QN AUTO: 28 PG (ref 26–34)
MCHC RBC AUTO-ENTMCNC: 31 G/DL (ref 32–36)
MCV RBC AUTO: 90 FL (ref 80–100)
MONOCYTES # BLD AUTO: 0.28 X10*3/UL (ref 0.1–1)
MONOCYTES NFR BLD AUTO: 4.8 %
NEUTROPHILS # BLD AUTO: 3.66 X10*3/UL (ref 1.2–7.7)
NEUTROPHILS NFR BLD AUTO: 62.6 %
NRBC BLD-RTO: 0 /100 WBCS (ref 0–0)
PHOSPHATE SERPL-MCNC: 3.7 MG/DL (ref 2.5–4.9)
PLATELET # BLD AUTO: 265 X10*3/UL (ref 150–450)
POTASSIUM SERPL-SCNC: 3.8 MMOL/L (ref 3.5–5.3)
PROTHROMBIN TIME: 11.1 SECONDS (ref 9.8–12.4)
RBC # BLD AUTO: 3.72 X10*6/UL (ref 4–5.2)
SODIUM SERPL-SCNC: 138 MMOL/L (ref 136–145)
WBC # BLD AUTO: 5.9 X10*3/UL (ref 4.4–11.3)

## 2025-05-06 PROCEDURE — 85730 THROMBOPLASTIN TIME PARTIAL: CPT

## 2025-05-06 PROCEDURE — 36415 COLL VENOUS BLD VENIPUNCTURE: CPT

## 2025-05-06 PROCEDURE — 80069 RENAL FUNCTION PANEL: CPT

## 2025-05-06 PROCEDURE — 83735 ASSAY OF MAGNESIUM: CPT

## 2025-05-06 PROCEDURE — 85025 COMPLETE CBC W/AUTO DIFF WBC: CPT

## 2025-05-06 PROCEDURE — RXMED WILLOW AMBULATORY MEDICATION CHARGE

## 2025-05-06 PROCEDURE — 99231 SBSQ HOSP IP/OBS SF/LOW 25: CPT

## 2025-05-06 RX ORDER — GUAIFENESIN/DEXTROMETHORPHAN 100-10MG/5
5 SYRUP ORAL EVERY 4 HOURS PRN
Qty: 118 ML | Refills: 0 | Status: SHIPPED | OUTPATIENT
Start: 2025-05-06

## 2025-05-06 ASSESSMENT — COGNITIVE AND FUNCTIONAL STATUS - GENERAL
DAILY ACTIVITIY SCORE: 24
MOBILITY SCORE: 24

## 2025-05-06 ASSESSMENT — PAIN SCALES - GENERAL: PAINLEVEL_OUTOF10: 0 - NO PAIN

## 2025-05-06 ASSESSMENT — PAIN - FUNCTIONAL ASSESSMENT: PAIN_FUNCTIONAL_ASSESSMENT: 0-10

## 2025-05-06 NOTE — NURSING NOTE
Patient discharged home with no needs on 5/6 @ 1330. Pt. VSS, HDS, and afebrile. Removed peripheral IV and catheter was intact. Reviewed discharge paperwork with patient and she had no questions. Meds to beds delivered one prescription. Medical team completed FMLA and return to work paperwork. Patient will follow up outpatient with IR and pulm.  drove her home in private car.

## 2025-05-06 NOTE — DISCHARGE INSTRUCTIONS
Dear Ms Prabhakar,    You were hospitalized here at [facility] on 5/1 after presenting to the ED with shortness of breath. We found on CT scan that you have a mass in part of your lung that is partially compressing the blood vessel called the SVC (superior vena cava) which drains blood into the heart. 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 They felt you didn't need any urgent treatment for this and that we didn't need to biopsy it. We then talked to our interventional radiology colleagues to see if they could place a stent in your SVC to help keep it open. They were unable to this inpatient but will plan for the procedure outpatient. We also sent a referral to vascular surgery to help with management of the stent.     Your medications changes and follow-up appointments are listed below in detail.  Referrals have been entered for your follow-up appointments.  You can schedule all follow-up appointments through the [facility], or you can call the  central scheduling line at [phone].  Please be sure to bring your discharge paperwork to all follow-up appointments.    If at any time you start developing worsened lightheadedness/dizziness, passing out, or shortness of breath you should immediately return to the emergency room or call 911.    It was a pleasure taking care of you here at [facility].  We wish you a speedy recovery.    Sincerely,  Your  Medicine Team      Discharge Meds     Your medication list        START taking these medications        Instructions Last Dose Given Next Dose Due   Guaiasorb DM  mg/5 mL oral liquid  Generic drug: dextromethorphan-guaifenesin      Take 5 mL by mouth every 4 hours if needed for cough.              CONTINUE taking these medications        Instructions Last Dose Given Next  Dose Due   blood pressure test kit-large kit      1 device used daily for blood pressure monitoring       metoprolol succinate XL 50 mg 24 hr tablet  Commonly known as: Toprol-XL      Take 1 tablet (50 mg) by mouth once daily. Do not crush or chew.       ONE-A-DAY WOMENS FORMULA ORAL           phentermine 37.5 mg capsule      Take 1 capsule (37.5 mg) by mouth once daily in the morning. Take before meals.       triamterene-hydrochlorothiazid 37.5-25 mg tablet  Commonly known as: Maxzide-25      Take 1 tablet by mouth once daily.              ASK your doctor about these medications        Instructions Last Dose Given Next Dose Due   phentermine 37.5 mg capsule  Start taking on: May 23, 2025      Take 1 capsule (37.5 mg) by mouth once daily in the morning. Take before meals. Do not fill before May 23, 2025.       phentermine 37.5 mg capsule  Start taking on: June 20, 2025      Take 1 capsule (37.5 mg) by mouth once daily in the morning. Take before meals. Do not fill before June 20, 2025.                 Where to Get Your Medications        These medications were sent to [facility]  [address], [address] [address]      Hours: 8AM to 6PM Mon-Fri, 8AM to 4PM Sat, 9AM to 1PM Sun Phone: [phone]   Gutonaorb DM  mg/5 mL oral liquid         Outpatient Follow-Up  Future Appointments   Date Time Provider Department Center   5/21/2025 11:00 AM John Mcgarry MD [id number] [facility]   5/30/2025  2:00 PM Merlin Wilson MD [id number] [facility]

## 2025-05-07 ENCOUNTER — PATIENT OUTREACH (OUTPATIENT)
Dept: PRIMARY CARE | Facility: CLINIC | Age: 35
End: 2025-05-07
Payer: COMMERCIAL

## 2025-05-07 NOTE — PROGRESS NOTES
Discharge Facility: [facility]  Discharge Diagnosis: SVC (superior vena cava obstruction)  Admission Date: 5/1/25  Discharge Date:  5/6/25    PCP Appointment Date: TBD - tasked to PCP office  Specialist Appointment Date: pulm 5/8/25 and 5/30/25;  Vascular 5/21/25  Hospital Encounter and Summary Linked: Yes  Admission (Discharged) with Shantel Palacios MD (05/01/2025)   See discharge assessment below for further details     Wrap Up  Wrap Up Additional Comments: Successful transitions of care outreach to patient.  Patient reports she is feeling scared and unsure about events that took place at the hospital. She has a lot of unanswered questions and would like to speak with PCP. Message sent to PCP to request call/appt.  There are no available appts for hospital discharge follow up.  Reviewed red flag symptoms to watch for and when to return to ER.  Patient did schedule an appointment with pulmonology through [facility] for tomorrow for a second opinion.  She has follow up appts scheduled with pulmonology and vascular as well.  No additional questions at this time. (5/7/2025 10:12 AM)    Engagement  Call Start Time: 1002 (5/7/2025 10:12 AM)    Medications  Medications reviewed with patient/caregiver?: Yes (5/7/2025 10:12 AM)  Is the patient having any side effects they believe may be caused by any medication additions or changes?: No (5/7/2025 10:12 AM)  Does the patient have all medications ordered at discharge?: Yes (5/7/2025 10:12 AM)  Care Management Interventions: No intervention needed (5/7/2025 10:12 AM)  Prescription Comments: START taking these medications    o Guaiasorb DM  mg/5 mL oral liquid; Generic drug:   dextromethorphan-guaifenesin; Take 5 mL by mouth every 4 hours if needed   for cough. (5/7/2025 10:12 AM)  Is the patient taking all medications as directed (includes completed medication regime)?: Yes (5/7/2025 10:12 AM)  Medication Comments: Patient had no questions/concerns related to  medication (5/7/2025 10:12 AM)    Appointments  Does the patient have a primary care provider?: Yes (5/7/2025 10:12 AM)  Care Management Interventions: Educated patient on importance of making appointment (5/7/2025 10:12 AM)  Has the patient kept scheduled appointments due by today?: Not applicable (5/7/2025 10:12 AM)    Self Management  Has home health visited the patient within 72 hours of discharge?: Not applicable (5/7/2025 10:12 AM)    Patient Teaching  Does the patient have access to their discharge instructions?: Yes (5/7/2025 10:12 AM)  Care Management Interventions: Reviewed instructions with patient (5/7/2025 10:12 AM)  What is the patient's perception of their health status since discharge?: Improving (5/7/2025 10:12 AM)  Is the patient/caregiver able to teach back the hierarchy of who to call/visit for symptoms/problems? PCP, Specialist, Home Health nurse, Urgent Care, ED, 911: Yes (5/7/2025 10:12 AM)

## 2025-05-07 NOTE — DISCHARGE SUMMARY
Discharge Diagnosis  SVC (superior vena cava obstruction)           Issues Requiring Follow-Up  Follow up with IR for SVC stent placement   Follow up with pulmonary for fibrosing mediastinitis   Follow up with vascular surgery for SVC sten management     Discharge Meds     Medication List      START taking these medications     Guaiasorb DM  mg/5 mL oral liquid; Generic drug:   dextromethorphan-guaifenesin; Take 5 mL by mouth every 4 hours if needed   for cough.     CONTINUE taking these medications     blood pressure test kit-large kit; 1 device used daily for blood   pressure monitoring   metoprolol succinate XL 50 mg 24 hr tablet; Commonly known as:   Toprol-XL; Take 1 tablet (50 mg) by mouth once daily. Do not crush or   chew.   ONE-A-DAY WOMENS FORMULA ORAL   * phentermine 37.5 mg capsule; Take 1 capsule (37.5 mg) by mouth once   daily in the morning. Take before meals.   triamterene-hydrochlorothiazid 37.5-25 mg tablet; Commonly known as:   Maxzide-25; Take 1 tablet by mouth once daily.  * This list has 1 medication(s) that are the same as other medications   prescribed for you. Read the directions carefully, and ask your doctor or   other care provider to review them with you.     ASK your doctor about these medications     * phentermine 37.5 mg capsule; Take 1 capsule (37.5 mg) by mouth once   daily in the morning. Take before meals. Do not fill before May 23, 2025.;   Start taking on: May 23, 2025   * phentermine 37.5 mg capsule; Take 1 capsule (37.5 mg) by mouth once   daily in the morning. Take before meals. Do not fill before June 20, 2025.; Start taking on: June 20, 2025  * This list has 2 medication(s) that are the same as other medications   prescribed for you. Read the directions carefully, and ask your doctor or   other care provider to review them with you.       Test Results Pending At Discharge  Pending Labs       Order Current Status    T-Spot TB In process            Hospital Course  34  year old female who presented with chest tightness, SOB, and productive cough with CT PE on admission sig. for 3.6 x 3.8 x 3.3 cm lobulated, partly calcified R hilar mass and suspicion for RUL pulmonary artery thrombus or embolus, with nearly occluded SVC with a PMHx of HTN, MICHELLE (not on CPAP), chronic constipation, and obesity. Prior imaging 3/2021 showed conglomerate of partially calcified lymph nodes in right paratrachial/hilar region, enlarged compared to first identification in 9/2019. CT A/P significant for sequelae of granulomatous disease in the liver and spleen.Duplex US of LE negative for DVT. The patient was initially treated with a heparin gtt but this was discontinued due to low suspicion for pulmonary artery thrombus.     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 Since her CT did not show evidence of active disease, the pulmonology team felt that the patient would not benefit from rituximab this admission. They recommended close outpatient follow-up with pulmonology (5/30/2025)      Interventional Radiology evaluated the patient and recommended an SVC stent, which could not be performed inpatient and will be arranged on an outpatient . Vascular Surgery follow-up will also be arranged for stent management. The patient remained hemodynamically stable and was discharged home.      Pertinent Physical Exam At Time of Discharge  Physical Exam  Constitutional:       General: She is not in acute distress.     Appearance: Normal appearance. She is obese. She is not ill-appearing or diaphoretic.   HENT:      Head: Normocephalic and atraumatic.      Nose: No congestion or rhinorrhea.   Eyes:      General: No scleral icterus.     Extraocular Movements: Extraocular movements intact.      Conjunctiva/sclera: Conjunctivae normal.      Pupils: Pupils are equal, round, and reactive to light.    Cardiovascular:      Rate and Rhythm: Normal rate and regular rhythm.      Heart sounds: No murmur heard.     No friction rub. No gallop.   Pulmonary:      Effort: Pulmonary effort is normal. No respiratory distress.      Breath sounds: No wheezing, rhonchi or rales.      Comments: Diminished breath sounds in R middle and superior lobes.  Chest:      Chest wall: No tenderness.   Abdominal:      General: Abdomen is flat. Bowel sounds are normal. There is no distension.      Palpations: Abdomen is soft. There is no mass.      Tenderness: There is no abdominal tenderness. There is no guarding or rebound.   Musculoskeletal:         General: No swelling, tenderness, deformity or signs of injury.      Cervical back: Neck supple. No tenderness.      Right lower leg: No edema.      Left lower leg: No edema.   Skin:     Capillary Refill: Capillary refill takes less than 2 seconds.      Coloration: Skin is not jaundiced or pale.      Findings: No bruising, erythema, lesion or rash.      Comments: Bruise/discoloration on R lower shin 2/2 trauma.   Neurological:      General: No focal deficit present.      Mental Status: She is alert and oriented to person, place, and time.      Motor: No weakness.      Gait: Gait normal.   Psychiatric:         Mood and Affect: Mood normal.         Behavior: Behavior normal.         Thought Content: Thought content normal.         Judgment: Judgment normal    Outpatient Follow-Up  Future Appointments   Date Time Provider Department [place]   5/21/2025 11:00 AM John Mcgarry MD [id number] [facility]   5/30/2025  2:00 PM Merlin Wilson MD [id number] [facility]   6/16/2025  8:00 AM [facility]         Kristie Stevenson MD  PGY1 Neurology

## 2025-05-13 ENCOUNTER — OFFICE VISIT (OUTPATIENT)
Dept: PULMONOLOGY | Facility: CLINIC | Age: 35
End: 2025-05-13
Payer: COMMERCIAL

## 2025-05-13 ENCOUNTER — PATIENT MESSAGE (OUTPATIENT)
Dept: OBSTETRICS AND GYNECOLOGY | Facility: CLINIC | Age: 35
End: 2025-05-13
Payer: COMMERCIAL

## 2025-05-13 VITALS
BODY MASS INDEX: 31.45 KG/M2 | HEART RATE: 88 BPM | RESPIRATION RATE: 20 BRPM | TEMPERATURE: 97.7 F | DIASTOLIC BLOOD PRESSURE: 89 MMHG | SYSTOLIC BLOOD PRESSURE: 129 MMHG | WEIGHT: 213 LBS

## 2025-05-13 DIAGNOSIS — B96.89 BV (BACTERIAL VAGINOSIS): ICD-10-CM

## 2025-05-13 DIAGNOSIS — J98.51 FIBROSING MEDIASTINITIS: Primary | ICD-10-CM

## 2025-05-13 DIAGNOSIS — N76.0 BV (BACTERIAL VAGINOSIS): ICD-10-CM

## 2025-05-13 PROCEDURE — 3079F DIAST BP 80-89 MM HG: CPT | Performed by: INTERNAL MEDICINE

## 2025-05-13 PROCEDURE — 99215 OFFICE O/P EST HI 40 MIN: CPT | Performed by: INTERNAL MEDICINE

## 2025-05-13 PROCEDURE — 3074F SYST BP LT 130 MM HG: CPT | Performed by: INTERNAL MEDICINE

## 2025-05-13 ASSESSMENT — PAIN SCALES - GENERAL: PAINLEVEL_OUTOF10: 0-NO PAIN

## 2025-05-13 NOTE — PATIENT INSTRUCTIONS
Dear Tatiana Prabhakar It was nice seeing you today. We discussed the following:     The fibrosing mediastinitis progressed a little on the most recent CT. You also describe dizziness upon standing over the last year   Repeat a chest CT in June 2025 to see of some of the enlargement is due to the recent respiratory illness   Please follow up with vascular surgery   I will also discuss with interventional radiology regarding the stent   RTC after the chest CT mid June     For scheduling purposes:    Call [phone] to schedule a breathing or a walking test     Call [phone] to schedule  EKG's, Echocardiograms and Cardiopulmonary Stress Tests.    Call [phone] to schedule Radiology tests such as Nuclear Medicine Stress Tests, CT Scans, and MRI's.    Should you have any questions Please Call my assistant [place] at [phone] or our pulmonary nurse Larissa Flores [phone].

## 2025-05-13 NOTE — PROGRESS NOTES
Follow up Fibrosing mediastinitis. Last seen in 09 2022       Admitted in 04 2025 for   Heaviness in the face   And dizziness upon standing withink the last year   Phentermine and lost about 50 lbs in the last year   Goes to the gym and does 45 mn on the tradmnil and weight trainin g  Could not get over a cold and then had a cough with mucous lingering cough and mucous no hemptysis and then the mucous was stuck   Breathing was laboerd in     /89   Pulse 88   Temp 36.5 °C (97.7 °F)   Resp 20   Wt 96.6 kg (213 lb)   BMI 31.45 kg/m²     Tatiana Prabhakar is a 34 y.o. female w/a PMH of HTN, MICHELLE (not on CPAP), Chronic Constipation and Obesity who initially presented to [facility] w/ complaints of chest tightness, cough and SOB. Less c/f ACS given negative trops and unremarkable EKG findings.  A CT PE done showed 3.6 x 3.8 x 3.3 cm lobulated R hilar mass consistent with her known Fibrosing mediastinitis     Further findings of R Pulm art flow defect c/f thrombus/emboli and severe flow restriction to the SVC. She was then started on Heparin gtt and transferred to [facility] for continued management and malignancy w/up.   Right sided JVD no dilated veins on chest and no facial swelling and no UE swellin g        Hospital Course  34 year old female who presented with chest tightness, SOB, and productive cough with CT PE on admission sig. for 3.6 x 3.8 x 3.3 cm lobulated, partly calcified R hilar mass and suspicion for RUL pulmonary artery thrombus or embolus, with nearly occluded SVC with a PMHx of HTN, MICHELLE (not on CPAP), chronic constipation, and obesity. Prior imaging 3/2021 showed conglomerate of partially calcified lymph nodes in right paratrachial/hilar region, enlarged compared to first identification in 9/2019. CT A/P significant for sequelae of granulomatous disease in the liver and spleen.Duplex US of LE negative for DVT. The patient was initially treated with a heparin gtt but this was discontinued due to low  suspicion for pulmonary artery thrombus.      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 Since her CT did not show evidence of active disease, the pulmonology team felt that the patient would not benefit from rituximab this admission. They recommended close outpatient follow-up with pulmonology (5/30/2025)        Interventional Radiology evaluated the patient and recommended an SVC stent, which could not be performed inpatient and will be arranged on an outpatient . Vascular Surgery follow-up will also be arranged for stent management. The patient remained hemodynamically stable and was discharged home.              HISTORY OF PRESENT ILLNESS:      30 year old  female with History of pneumonia in 2019 and abnormal chest CT dating back to 2012. Patient denies any progressive dyspnea, no cough dry/productive, chest tightness or wheezing Denies fever or chills, night sweats, weight loss or GALINA. Denies problems swallowing or chocking on food or cough when eating, or reflux. Denies tight skin, Raynaud's, joint pain or swelling. No rashes or ulcers. 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  Constitutional: Denies symptoms related to weight loss, fever or chills.  ENT: Denies symptoms related to hearing loss, sore throat, sinusitis and masses.  Cardiovascular: Denies symptoms related to chest pain, palpitations, edema and orthopnea.  Respiratory: Denies symptoms related to dyspnea, wheezing, cough, hemoptysis or increased sputum.  Dyspnea Scale:   MMRC 0 - I only get breathless with strenuous exercise.     Gastrointestinal: Denies symptoms related to nausea, vomiting, diarrhea or constipation. GERD   Genitourinary: Denies symptoms related to hematuria, nocturia, frequency or urgency.  Musculoskeletal: Denies symptoms related to arthritis, joint pain, leg pain, weakness or joint stiffness. Raynaud's  Integumentary: Denies symptoms related to skin rashes, moles, pigment changes or ulcers.  Neurological: Denies symptoms related to dizziness, syncope, seizures, tremors or paralysis.  Psychiatric: Denies psychiatric symptoms associated with PTSD, depression, anxiety, psychosis or hallucinations.  Endocrine: Denies endocrine symptoms related to diabetes, polyuria, and cold/heat intolerance.  Hematologic: Denies hematologic symptoms associated with anemia, bruising, bleeding or lymph node tenderness.  Allergic: Denies allergic symptoms associated with allergy to meds, foods or contrast dye.        PHYSICAL EXAM  VS reviewed  Constitutional: In no acute distress. No obvious shortness of breath or coughing during exam/interview.  HEENT: Throat without erythema/exudate/thrush. No palpable lymph nodes in neck. no scleral icterus  Respiratory: Clear throughout with no wheezing/crackles/rhonchi. Equal chest expansion. No egophony. No dullness to percussion. No tactile fremitus.   Cardiovascular: Normal S1/S2, RRR, no murmurs/gallops/rubs appreciated.  Gastrointestinal: Soft, non-tender, non-distended with active bowel sounds.  Neurologic: Alert, oriented X 3, otherwise grossly  non-focal.  Extremities: No clubbing/cyanosis/edema. Peripheral pulses palpable.  Psychiatric: Appropriate mood and behavior.        DATA:     PFTs none   === 05/01/25 ===      Imaging: I have personally visualized the most recent imaging from 2019 in 2012.  The chest CT from 2019 shows an enlarged right hilar lymph node/soft tissue (unchanged in size from 2012) with calcification resulting in compression of the right upper lobe bronchus and of the right superior pulmonary vein and the azygous. some linear opacities could reflect localized Pulm edema. splenic calcifications are also present.     The hilar lymph node/mass was present in 2012 but lacked calcification     === 05/01/25 ===    CT ABDOMEN PELVIS W IV CONTRAST    - Impression -  1. No definite evidence of metastatic disease in the abdomen or  pelvis.  2. Sequela of prior granulomatous disease in the liver and spleen.  3. Please refer to the prior CT dated 05/01/2025 for better  evaluation consolidative opacities of the right middle lobe.    I personally reviewed the image(s) / study and I agree with the  findings as stated by Hang Marin MD. This study was interpreted at  [facility], [place], [place].    MACRO:  None.    Signed by: Teddy Welsh 5/2/2025 3:35 PM  Dictation workstation:   [id number]       IMPRESSION AND PLAN:   .  30 year old  [place] female with radiographic picture of fibrosing mediastinitis with compression of the right superior pulmonary vein and the azygous and the SVC also narrowing of the right upper lobe bronchus. Given the dense calcification and the splenic calcification this is most likely related to remote histoplasmosis. Given the lack of symptoms no further intervention is needed. We will however continue radiographic monitoring every 1 to 2 years. There is no role for antifungal or corticosteroid in the treatment of fibrosing mediastinitis.

## 2025-05-16 RX ORDER — METRONIDAZOLE 500 MG/1
500 TABLET ORAL 2 TIMES DAILY
Qty: 14 TABLET | Refills: 0 | Status: SHIPPED | OUTPATIENT
Start: 2025-05-16 | End: 2025-05-23

## 2025-05-19 ENCOUNTER — TELEPHONE (OUTPATIENT)
Dept: OBSTETRICS AND GYNECOLOGY | Facility: CLINIC | Age: 35
End: 2025-05-19
Payer: COMMERCIAL

## 2025-05-19 NOTE — TELEPHONE ENCOUNTER
Pt verified by name and .  Pt calling for sti orders.  Pt states she has an appointment with Dr. Moody tomorrow she will get order for testing then.  Pt has no questions at this time.

## 2025-05-20 ENCOUNTER — OFFICE VISIT (OUTPATIENT)
Facility: CLINIC | Age: 35
End: 2025-05-20
Payer: COMMERCIAL

## 2025-05-20 ENCOUNTER — APPOINTMENT (OUTPATIENT)
Dept: OBSTETRICS AND GYNECOLOGY | Facility: CLINIC | Age: 35
End: 2025-05-20
Payer: COMMERCIAL

## 2025-05-20 VITALS
BODY MASS INDEX: 31.55 KG/M2 | DIASTOLIC BLOOD PRESSURE: 81 MMHG | SYSTOLIC BLOOD PRESSURE: 136 MMHG | WEIGHT: 213 LBS | HEIGHT: 69 IN

## 2025-05-20 DIAGNOSIS — R87.610 ASCUS WITH POSITIVE HIGH RISK HPV CERVICAL: ICD-10-CM

## 2025-05-20 DIAGNOSIS — N89.8 VAGINAL DISCHARGE: Primary | ICD-10-CM

## 2025-05-20 DIAGNOSIS — N93.0 POSTCOITAL BLEEDING: ICD-10-CM

## 2025-05-20 DIAGNOSIS — R87.810 ASCUS WITH POSITIVE HIGH RISK HPV CERVICAL: ICD-10-CM

## 2025-05-20 PROCEDURE — 3079F DIAST BP 80-89 MM HG: CPT | Performed by: OBSTETRICS & GYNECOLOGY

## 2025-05-20 PROCEDURE — 1036F TOBACCO NON-USER: CPT | Performed by: OBSTETRICS & GYNECOLOGY

## 2025-05-20 PROCEDURE — 3008F BODY MASS INDEX DOCD: CPT | Performed by: OBSTETRICS & GYNECOLOGY

## 2025-05-20 PROCEDURE — 3075F SYST BP GE 130 - 139MM HG: CPT | Performed by: OBSTETRICS & GYNECOLOGY

## 2025-05-20 PROCEDURE — 99214 OFFICE O/P EST MOD 30 MIN: CPT | Performed by: OBSTETRICS & GYNECOLOGY

## 2025-05-20 PROCEDURE — 99459 PELVIC EXAMINATION: CPT | Performed by: OBSTETRICS & GYNECOLOGY

## 2025-05-20 ASSESSMENT — PATIENT HEALTH QUESTIONNAIRE - PHQ9
1. LITTLE INTEREST OR PLEASURE IN DOING THINGS: NOT AT ALL
2. FEELING DOWN, DEPRESSED OR HOPELESS: NOT AT ALL
SUM OF ALL RESPONSES TO PHQ9 QUESTIONS 1 & 2: 0

## 2025-05-20 NOTE — PROGRESS NOTES
SUBJECTIVE    34 y.o.  Having periods female presents for   Chief Complaint   Patient presents with    Vaginal Discharge     Vaginal discharge      Pt was recently in the hospital for a suspected PE and was given heparin while in the hospital. She was felt to have fibrosing mediastinitis, presumed from a remote hisoplasmosis infection. Follow up with Pulmonary was arranged and she was not discharged on anticoagulants.      She was sexually active on  and had some bleeding after intercourse.  The bleeding was very short lived and she only had spotting the rest of the night.    She also has noticed a vaginal discharge with an odor.    OB/GYN History  Patient's last menstrual period was 2025.    Social History     Substance and Sexual Activity   Sexual Activity Yes    Partners: Male    Birth control/protection: Female Sterilization       OB History    Para Term  AB Living   4 4 4 0 0 4   SAB IAB Ectopic Multiple Live Births   0 0 0  4      # Outcome Date GA Lbr Bob/2nd Weight Sex Type Anes PTL Lv   4 Term      Vag-Spont      3 Term      Vag-Spont      2 Term      Vag-Spont      1 Term      Vag-Spont          Past Medical History  She has a past medical history of Blepharochalasis left eye, unspecified eyelid (2018), Blepharochalasis left upper eyelid (2017), Body mass index (BMI) 38.0-38.9, adult (2016), Chronic sinusitis, unspecified (2017), Edema of left upper eyelid (2017), Encounter for gynecological examination (general) (routine) without abnormal findings (2017), Encounter for routine postpartum follow-up (2017), Essential (primary) hypertension (2017), Hypertension, Impacted cerumen, left ear (10/13/2017), Localized edema (10/30/2014), Low back pain, unspecified (2018), Obstructive sleep apnea (adult) (pediatric) (10/01/2019), Obstructive sleep apnea (adult) (pediatric) (2014), Other conditions influencing health status,  Other conditions influencing health status, Personal history of other diseases of the circulatory system (10/28/2015), Personal history of other diseases of the nervous system and sense organs (04/07/2017), Personal history of other diseases of the nervous system and sense organs (09/23/2015), Personal history of other diseases of the respiratory system (06/02/2020), Personal history of other endocrine, nutritional and metabolic disease (10/30/2015), Personal history of other specified conditions (08/01/2016), Personal history of other specified conditions (04/07/2017), Personal history of other specified conditions (05/18/2017), Polyp of cervix uteri (03/17/2016), Pure hypercholesterolemia, unspecified (02/20/2015), Scotoma of blind spot area, left eye (04/07/2017), Sleep apnea, Supervision of pregnancy with other poor reproductive or obstetric history, first trimester ([facility]) (12/29/2016), Unspecified acute lower respiratory infection (12/18/2017), Unspecified asthma, uncomplicated ([facility]) (06/02/2020), and Unspecified visual disturbance (04/07/2017).    Surgical History  She has a past surgical history that includes Other surgical history (02/07/2017).     Social History  She reports that she has never smoked. She has never been exposed to tobacco smoke. She has never used smokeless tobacco. She reports current alcohol use. She reports that she does not use drugs.    Medications:  Current Medications[1]    Screenings  Social Drivers of Health     Tobacco Use: Low Risk  (5/20/2025)    Patient History     Smoking Tobacco Use: Never     Smokeless Tobacco Use: Never     Passive Exposure: Never   Alcohol Use: Not At Risk (5/1/2025)    AUDIT-C     Frequency of Alcohol Consumption: Never     Average Number of Drinks: Patient does not drink     Frequency of Binge Drinking: Never   Financial Resource Strain: Low Risk  (5/2/2025)    Overall Financial Resource Strain (CARDIA)     Difficulty of Paying Living Expenses:  "Not very hard   Food Insecurity: No Food Insecurity (5/1/2025)    Hunger Vital Sign     Worried About Running Out of Food in the Last Year: Never true     Ran Out of Food in the Last Year: Never true   Transportation Needs: No Transportation Needs (5/2/2025)    PRAPARE - Transportation     Lack of Transportation (Medical): No     Lack of Transportation (Non-Medical): No   Physical Activity: Not on File (4/14/2023)    Received from [facility]    Physical Activity     Physical Activity: 0   Stress: Not on File (4/14/2023)    Received from [facility]    Stress     Stress: 0   Social Connections: Not on File (9/12/2024)    Received from [facility]    Social Connections     Connectedness: 0   Intimate Partner Violence: Not At Risk (5/1/2025)    Humiliation, Afraid, Rape, and Kick questionnaire     Fear of Current or Ex-Partner: No     Emotionally Abused: No     Physically Abused: No     Sexually Abused: No   Depression: Not at risk (5/20/2025)    PHQ-2     PHQ-2 Score: 0   Housing Stability: Low Risk  (5/2/2025)    Housing Stability Vital Sign     Unable to Pay for Housing in the Last Year: No     Number of Times Moved in the Last Year: 1     Homeless in the Last Year: No   Utilities: Not At Risk (5/1/2025)    [facility] Utilities     Threatened with loss of utilities: No   Digital Equity: Not on file   Health Literacy: Not on file         OBJECTIVE  Vitals:    05/20/25 1637   BP: 136/81   Weight: 96.6 kg (213 lb)   Height: 1.753 m (5' 9\")     Body mass index is 31.45 kg/m².     Chaperone: Present: yes  Physical Exam  Constitutional:       Appearance: Normal appearance.   Genitourinary:      No lesions in the vagina.      Right Labia: No rash, tenderness or lesions.     Left Labia: No tenderness, lesions or rash.     Vaginal discharge (scant white) present.      No vaginal erythema, bleeding or ulceration.      No cervical discharge, friability or lesion.   Abdominal:      General: Abdomen is flat. There is no distension.      Tenderness: " There is no abdominal tenderness.   Neurological:      Mental Status: She is alert.        ASSESSMENT & PLAN  Problem List Items Addressed This Visit       ASCUS with positive high risk HPV cervical     Other Visit Diagnoses         Vaginal discharge    -  Primary    Relevant Orders    Vaginitis Gram Stain For Bacterial Vaginosis + Yeast    C. trachomatis / N. gonorrhoeae, Amplified, Urogenital    Trichomonas vaginalis, Amplified      Postcoital bleeding                Follow up: Infection testing obtained.  Will follow up results. PT to follow up 7/2025 for repeat PAP for ASCUS/HPV follow up.    Shyam Moody MD  Obstetrics & Gynecology  05/20/25]       [1]   Current Outpatient Medications:     blood pressure test kit-large kit, 1 device used daily for blood pressure monitoring, Disp: 1 each, Rfl: 0    metoprolol succinate XL (Toprol-XL) 50 mg 24 hr tablet, Take 1 tablet (50 mg) by mouth once daily. Do not crush or chew., Disp: 90 tablet, Rfl: 3    multivit,calc,mins/iron/folic (ONE-A-DAY WOMENS FORMULA ORAL), Take 1 tablet by mouth once daily., Disp: , Rfl:     phentermine 37.5 mg capsule, Take 1 capsule (37.5 mg) by mouth once daily in the morning. Take before meals., Disp: 30 capsule, Rfl: 0    [START ON 5/23/2025] phentermine 37.5 mg capsule, Take 1 capsule (37.5 mg) by mouth once daily in the morning. Take before meals. Do not fill before May 23, 2025., Disp: 30 capsule, Rfl: 0    [START ON 6/20/2025] phentermine 37.5 mg capsule, Take 1 capsule (37.5 mg) by mouth once daily in the morning. Take before meals. Do not fill before June 20, 2025., Disp: 30 capsule, Rfl: 0    triamterene-hydrochlorothiazid (Maxzide-25) 37.5-25 mg tablet, Take 1 tablet by mouth once daily., Disp: 90 tablet, Rfl: 3    dextromethorphan-guaifenesin (Robitussin DM)  mg/5 mL oral liquid, Take 5 mL by mouth every 4 hours if needed for cough. (Patient not taking: Reported on 5/20/2025), Disp: 118 mL, Rfl: 0    metroNIDAZOLE (Flagyl)  500 mg tablet, Take 1 tablet (500 mg) by mouth 2 times a day for 7 days. (Patient not taking: Reported on 5/20/2025), Disp: 14 tablet, Rfl: 0

## 2025-05-21 ENCOUNTER — PREP FOR PROCEDURE (OUTPATIENT)
Dept: RADIOLOGY | Facility: HOSPITAL | Age: 35
End: 2025-05-21

## 2025-05-21 DIAGNOSIS — I87.1 SVC (SUPERIOR VENA CAVA OBSTRUCTION): Primary | ICD-10-CM

## 2025-05-21 DIAGNOSIS — N76.0 BV (BACTERIAL VAGINOSIS): Primary | ICD-10-CM

## 2025-05-21 DIAGNOSIS — B96.89 BV (BACTERIAL VAGINOSIS): Primary | ICD-10-CM

## 2025-05-21 LAB
BV SCORE VAG QL: ABNORMAL
C TRACH RRNA SPEC QL NAA+PROBE: NOT DETECTED
N GONORRHOEA RRNA SPEC QL NAA+PROBE: NOT DETECTED
QUEST GC CT AMPLIFIED (ALWAYS MESSAGE): NORMAL
T VAGINALIS RRNA SPEC QL NAA+PROBE: NOT DETECTED

## 2025-05-21 RX ORDER — METRONIDAZOLE 500 MG/1
500 TABLET ORAL 2 TIMES DAILY
Qty: 14 TABLET | Refills: 0 | Status: SHIPPED | OUTPATIENT
Start: 2025-05-21 | End: 2025-05-28

## 2025-05-27 DIAGNOSIS — I10 ESSENTIAL (PRIMARY) HYPERTENSION: ICD-10-CM

## 2025-05-27 DIAGNOSIS — I10 PRIMARY HYPERTENSION: ICD-10-CM

## 2025-05-27 RX ORDER — TRIAMTERENE AND HYDROCHLOROTHIAZIDE 37.5; 25 MG/1; MG/1
1 TABLET ORAL DAILY
Qty: 90 TABLET | Refills: 3 | Status: SHIPPED | OUTPATIENT
Start: 2025-05-27

## 2025-05-27 RX ORDER — METOPROLOL SUCCINATE 50 MG/1
50 TABLET, EXTENDED RELEASE ORAL DAILY
Qty: 90 TABLET | Refills: 3 | Status: SHIPPED | OUTPATIENT
Start: 2025-05-27

## 2025-05-30 ENCOUNTER — APPOINTMENT (OUTPATIENT)
Dept: PULMONOLOGY | Facility: HOSPITAL | Age: 35
End: 2025-05-30
Payer: COMMERCIAL

## 2025-06-12 ENCOUNTER — APPOINTMENT (OUTPATIENT)
Dept: PRIMARY CARE | Facility: CLINIC | Age: 35
End: 2025-06-12
Payer: COMMERCIAL

## 2025-06-12 VITALS
BODY MASS INDEX: 31.01 KG/M2 | HEART RATE: 83 BPM | DIASTOLIC BLOOD PRESSURE: 81 MMHG | SYSTOLIC BLOOD PRESSURE: 119 MMHG | WEIGHT: 210 LBS

## 2025-06-12 DIAGNOSIS — Z51.81 MEDICATION MONITORING ENCOUNTER: ICD-10-CM

## 2025-06-12 DIAGNOSIS — J98.51 SCLEROSING MEDIASTINITIS: Primary | ICD-10-CM

## 2025-06-12 PROCEDURE — 1036F TOBACCO NON-USER: CPT | Performed by: INTERNAL MEDICINE

## 2025-06-12 PROCEDURE — 3079F DIAST BP 80-89 MM HG: CPT | Performed by: INTERNAL MEDICINE

## 2025-06-12 PROCEDURE — 99213 OFFICE O/P EST LOW 20 MIN: CPT | Performed by: INTERNAL MEDICINE

## 2025-06-12 PROCEDURE — 3074F SYST BP LT 130 MM HG: CPT | Performed by: INTERNAL MEDICINE

## 2025-06-12 RX ORDER — ITRACONAZOLE 100 MG/1
200 CAPSULE ORAL 2 TIMES DAILY
COMMUNITY

## 2025-06-12 ASSESSMENT — ENCOUNTER SYMPTOMS
PALPITATIONS: 0
COUGH: 0
CHILLS: 0
SHORTNESS OF BREATH: 0
POLYDIPSIA: 0
FEVER: 0

## 2025-06-12 ASSESSMENT — PAIN SCALES - GENERAL: PAINLEVEL_OUTOF10: 0-NO PAIN

## 2025-06-12 NOTE — PROGRESS NOTES
Subjective   Patient ID: Tatiana Prabhakar is a 34 y.o. female who presents for Hospital Follow-up.    Hospital Course  34 year old female who presented with chest tightness, SOB, and productive cough with CT PE on admission sig. for 3.6 x 3.8 x 3.3 cm lobulated, partly calcified R hilar mass and suspicion for RUL pulmonary artery thrombus or embolus, with nearly occluded SVC with a PMHx of HTN, MICHELLE (not on CPAP), chronic constipation, and obesity. Prior imaging 3/2021 showed conglomerate of partially calcified lymph nodes in right paratrachial/hilar region, enlarged compared to first identification in 9/2019. CT A/P significant for sequelae of granulomatous disease in the liver and spleen.Duplex US of LE negative for DVT. The patient was initially treated with a heparin gtt but this was discontinued due to low suspicion for pulmonary artery thrombus.      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 Since her CT did not show evidence of active disease, the pulmonology team felt that the patient would not benefit from rituximab this admission. They recommended close outpatient follow-up with pulmonology (5/30/2025)      Interventional Radiology evaluated the patient and recommended an SVC stent, which could not be performed inpatient and will be arranged on an outpatient . Vascular Surgery follow-up will also be arranged for stent management. The patient remained hemodynamically stable and was discharged home.    HPI:  Fibrosing Mediastinitis:  - Diagnosed with fibrosing mediastinitis in 2022.  - Recent hospitalization at [facility] x 6 days due to persistent mucus and airway tightness following a cold.  - Underwent multiple tests, including CT scans, echocardiogram, and ultrasound of the legs.  - Initially suspected of having lung cancer; no biopsy performed due to calcification concerns.  -  Reports facial tightness, dizziness upon standing, and mild exertional dyspnea.  - Lost nearly 50 lbs recently.  - Denies exposure to bird or bat droppings, caves, or farms.  - Smokes marijuana often since age 19.  - No known exposure to histoplasma     PLAN:     Long discussion with the patient and her    - Reassured   - Fungal battery   - Itraconazole 200 BID x 60 days   - RTC 2 months with CT Chest and PET scan   - Ambulatory oximetry/ 6' min walk distance   - Ventilation/Perfusion Lung Scan   - Consider vascular stenting if there is significant physiological impairment   - IR consult +/- Retuxim Rx post above                Review of Systems   Constitutional:  Negative for chills and fever.   Respiratory:  Negative for cough and shortness of breath.    Cardiovascular:  Negative for chest pain and palpitations.   Endocrine: Negative for polydipsia and polyuria.       Objective   /81   Pulse 83   Wt 95.3 kg (210 lb)   LMP 05/13/2025   BMI 31.01 kg/m²     Physical Exam  Constitutional:       Appearance: Normal appearance.   HENT:      Head: Normocephalic and atraumatic.   Eyes:      Extraocular Movements: Extraocular movements intact.      Pupils: Pupils are equal, round, and reactive to light.   Neck:      Thyroid: No thyroid mass or thyromegaly.   Cardiovascular:      Rate and Rhythm: Normal rate and regular rhythm.      Heart sounds: No murmur heard.     No friction rub. No gallop.   Pulmonary:      Effort: No respiratory distress.      Breath sounds: No wheezing, rhonchi or rales.   Musculoskeletal:      Cervical back: Neck supple.      Right lower leg: No edema.      Left lower leg: No edema.   Neurological:      Mental Status: She is alert.         Assessment/Plan   Assessment & Plan  Sclerosing mediastinitis         Medication monitoring encounter    Orders:    Hepatic function panel; Future

## 2025-06-16 ENCOUNTER — APPOINTMENT (OUTPATIENT)
Dept: RADIOLOGY | Facility: HOSPITAL | Age: 35
End: 2025-06-16
Payer: COMMERCIAL

## 2025-06-17 DIAGNOSIS — Z71.3 ENCOUNTER FOR WEIGHT LOSS COUNSELING: ICD-10-CM

## 2025-06-17 RX ORDER — PHENTERMINE HYDROCHLORIDE 37.5 MG/1
37.5 CAPSULE ORAL
Qty: 30 CAPSULE | Refills: 0 | Status: SHIPPED | OUTPATIENT
Start: 2025-06-17 | End: 2025-07-17

## 2025-06-17 RX ORDER — PHENTERMINE HYDROCHLORIDE 37.5 MG/1
37.5 CAPSULE ORAL
Qty: 30 CAPSULE | Refills: 0 | Status: SHIPPED | OUTPATIENT
Start: 2025-07-16 | End: 2025-08-15

## 2025-06-23 ENCOUNTER — APPOINTMENT (OUTPATIENT)
Dept: VASCULAR SURGERY | Facility: CLINIC | Age: 35
End: 2025-06-23
Payer: COMMERCIAL

## 2025-07-02 ENCOUNTER — OFFICE VISIT (OUTPATIENT)
Dept: OBSTETRICS AND GYNECOLOGY | Facility: CLINIC | Age: 35
End: 2025-07-02
Payer: COMMERCIAL

## 2025-07-02 VITALS
HEIGHT: 69 IN | HEART RATE: 108 BPM | WEIGHT: 206.8 LBS | BODY MASS INDEX: 30.63 KG/M2 | DIASTOLIC BLOOD PRESSURE: 92 MMHG | SYSTOLIC BLOOD PRESSURE: 143 MMHG

## 2025-07-02 DIAGNOSIS — N76.0 BV (BACTERIAL VAGINOSIS): ICD-10-CM

## 2025-07-02 DIAGNOSIS — N89.8 VAGINAL DISCHARGE: Primary | ICD-10-CM

## 2025-07-02 DIAGNOSIS — B96.89 BV (BACTERIAL VAGINOSIS): ICD-10-CM

## 2025-07-02 PROCEDURE — 3008F BODY MASS INDEX DOCD: CPT | Performed by: NURSE PRACTITIONER

## 2025-07-02 PROCEDURE — 3080F DIAST BP >= 90 MM HG: CPT | Performed by: NURSE PRACTITIONER

## 2025-07-02 PROCEDURE — 99214 OFFICE O/P EST MOD 30 MIN: CPT | Performed by: NURSE PRACTITIONER

## 2025-07-02 PROCEDURE — 3077F SYST BP >= 140 MM HG: CPT | Performed by: NURSE PRACTITIONER

## 2025-07-02 RX ORDER — METRONIDAZOLE 500 MG/1
500 TABLET ORAL 2 TIMES DAILY
Qty: 14 TABLET | Refills: 0 | Status: SHIPPED | OUTPATIENT
Start: 2025-07-02 | End: 2025-07-09

## 2025-07-02 ASSESSMENT — ENCOUNTER SYMPTOMS
OCCASIONAL FEELINGS OF UNSTEADINESS: 0
ENDOCRINE NEGATIVE: 1
CARDIOVASCULAR NEGATIVE: 1
LOSS OF SENSATION IN FEET: 0
CONSTITUTIONAL NEGATIVE: 1
DEPRESSION: 0
NEUROLOGICAL NEGATIVE: 1
GASTROINTESTINAL NEGATIVE: 1
MUSCULOSKELETAL NEGATIVE: 1
PSYCHIATRIC NEGATIVE: 1
EYES NEGATIVE: 1
RESPIRATORY NEGATIVE: 1

## 2025-07-02 ASSESSMENT — PAIN SCALES - GENERAL: PAINLEVEL_OUTOF10: 0-NO PAIN

## 2025-07-02 NOTE — PROGRESS NOTES
Subjective   Patient ID: Tatiana Prabhakar is a 34 y.o. female who presents for vaginl problem and Vaginal Problem.    HPI    Vaginal Symptoms:  - She is experiencing recurrent BV and is seeking advice on prevention.  - She's been advised previously not to use soap internally, which she has adhered to, but continues to experience infections.  - Describes discharge as having a brown tinge and a noticeable odor.  - She's not sexually active often and has not had recent intercourse.  - She's been treated with oral antibiotics for BV in the past and prefers this over topical treatments due to sensitivity.  - Inquires about the use of probiotics and boric acid suppositories for prevention.  - Reports spotting.  - Recently ended menstrual cycle about 2 days ago.  - Mentions wearing a panty liner and noticing brown discharge.    OBGYN History & Sexual Activity:  - She's been diagnosed with a prolapsed uterus, but she doesn't believe it's related to her current symptoms.  - She's  and not currently sexually active.    Additional History:  - She has a hx of histoplasmosis and was recently hospitalized due to a lung mass, which was not cancerous.  - She's currently on antifungal medication and is concerned about interactions with BV treatment.    Social History:  - She reports occasional marijuana use and alcohol consumption, particularly during stressful periods.  - She's .      Review of Systems   Constitutional: Negative.    HENT: Negative.     Eyes: Negative.    Respiratory: Negative.     Cardiovascular: Negative.    Gastrointestinal: Negative.    Endocrine: Negative.    Genitourinary:  Positive for vaginal discharge.        Reports recurrent BV, brown discharge, and spotting   Musculoskeletal: Negative.    Neurological: Negative.    Psychiatric/Behavioral: Negative.         Objective   Physical Exam  Constitutional:       General: She is not in acute distress.     Appearance: Normal appearance. She is not  ill-appearing.   HENT:      Head: Normocephalic.   Cardiovascular:      Rate and Rhythm: Normal rate and regular rhythm.   Genitourinary:     Vagina: Injury: excessive discharge noted, described as brown and possibly old blood. Vaginal discharge present.      Comments: Uterus appears high and normal upon exam.  Neurological:      Mental Status: She is alert.   Psychiatric:         Mood and Affect: Mood normal.         Behavior: Behavior normal.         Thought Content: Thought content normal.         Judgment: Judgment normal.         Assessment/Plan   34-year-old female being assessed for bacterial vaginosis, spotting/brown discharge, histoplasmosis and antifungal treatment, and stress/lifestyle factors.    Diagnoses:  #1 Bacterial vaginosis  #2 Spotting  #3 Brown discharge  #4 Histoplasmosis  #5 Antifungal treatment  #6 Stress/lifestyle factors    Plan:  BV  - Rx for Metronidazole 500 mg, one tablet orally BID for x7 days for suspected BV.  - Educated her on avoiding internal vaginal cleaning with soap and only using warm water externally.  - Recommended OTC boric acid vaginal suppositories as a preventive measure.  - Provided information on [employer]'s probiotic BV prevention kit.  - Encouraged the use of probiotics containing lactobacilli and consumption of high-quality yogurt.    2. Spotting, brown discharge  - Advised her to monitor for changes in discharge or additional symptoms, if they do come up moving forward.  - Ensured UTD pap. She's due in August.  - Schedule Pap as planned.    3. Histoplasmosis, antifungal treatment  - She's currently on Sporanox for histoplasmosis.  - Confirmed that Metronidazole can be taken concurrently with Itraconazole.  - Advised her to monitor for any adverse reactions to the new regimen.    4. Stress/lifestyle factors  - Acknowledged her stress and provided reassurance.  - Encouraged healthy lifestyle practices to manage stress.    Follow-up in 1-2 weeks or PRN  sooner.    Scribe Attestation  By signing my name below, I, Guevara Palacio, attest that this documentation has been prepared under the direction and in the presence of PAUL Thrasher on 07/02/2025 at 5:59 PM.    SPEKE audio duration: 18 minutes    I spent a total of {eConsult Time:09651} in face to face and non face to face time.     PAUL Thrasher 07/02/25 4:15 PM

## 2025-07-03 LAB — BV SCORE VAG QL: ABNORMAL

## 2025-07-16 LAB
ALBUMIN SERPL-MCNC: 4.5 G/DL (ref 3.6–5.1)
ALBUMIN/GLOB SERPL: 1.3 (CALC) (ref 1–2.5)
ALP SERPL-CCNC: 54 U/L (ref 31–125)
ALT SERPL-CCNC: 13 U/L (ref 6–29)
AST SERPL-CCNC: 13 U/L (ref 10–30)
BILIRUB DIRECT SERPL-MCNC: 0.1 MG/DL
BILIRUB INDIRECT SERPL-MCNC: 0.5 MG/DL (CALC) (ref 0.2–1.2)
BILIRUB SERPL-MCNC: 0.6 MG/DL (ref 0.2–1.2)
GLOBULIN SER CALC-MCNC: 3.4 G/DL (CALC) (ref 1.9–3.7)
PROT SERPL-MCNC: 7.9 G/DL (ref 6.1–8.1)

## 2025-07-30 DIAGNOSIS — Z71.3 ENCOUNTER FOR WEIGHT LOSS COUNSELING: ICD-10-CM

## 2025-07-31 RX ORDER — PHENTERMINE HYDROCHLORIDE 37.5 MG/1
37.5 CAPSULE ORAL
Qty: 30 CAPSULE | Refills: 0 | Status: SHIPPED | OUTPATIENT
Start: 2025-07-31 | End: 2025-08-30

## 2025-08-09 ENCOUNTER — APPOINTMENT (OUTPATIENT)
Dept: RADIOLOGY | Facility: HOSPITAL | Age: 35
End: 2025-08-09
Payer: COMMERCIAL

## 2025-08-09 ENCOUNTER — APPOINTMENT (OUTPATIENT)
Dept: CARDIOLOGY | Facility: HOSPITAL | Age: 35
End: 2025-08-09
Payer: COMMERCIAL

## 2025-08-09 ENCOUNTER — HOSPITAL ENCOUNTER (EMERGENCY)
Facility: HOSPITAL | Age: 35
Discharge: HOME | End: 2025-08-09
Attending: EMERGENCY MEDICINE
Payer: COMMERCIAL

## 2025-08-09 VITALS
RESPIRATION RATE: 16 BRPM | HEIGHT: 69 IN | SYSTOLIC BLOOD PRESSURE: 123 MMHG | DIASTOLIC BLOOD PRESSURE: 91 MMHG | HEART RATE: 78 BPM | BODY MASS INDEX: 29.62 KG/M2 | TEMPERATURE: 96.5 F | OXYGEN SATURATION: 98 % | WEIGHT: 200 LBS

## 2025-08-09 DIAGNOSIS — R07.9 CHEST PAIN, UNSPECIFIED TYPE: Primary | ICD-10-CM

## 2025-08-09 LAB
ALBUMIN SERPL BCP-MCNC: 4.2 G/DL (ref 3.4–5)
ALP SERPL-CCNC: 55 U/L (ref 33–110)
ALT SERPL W P-5'-P-CCNC: 12 U/L (ref 7–45)
ANION GAP SERPL CALC-SCNC: 10 MMOL/L (ref 10–20)
AST SERPL W P-5'-P-CCNC: 11 U/L (ref 9–39)
B-HCG SERPL-ACNC: <2 MIU/ML
BASOPHILS # BLD AUTO: 0.04 X10*3/UL (ref 0–0.1)
BASOPHILS NFR BLD AUTO: 0.7 %
BILIRUB SERPL-MCNC: 0.6 MG/DL (ref 0–1.2)
BUN SERPL-MCNC: 13 MG/DL (ref 6–23)
CALCIUM SERPL-MCNC: 9 MG/DL (ref 8.6–10.3)
CARDIAC TROPONIN I PNL SERPL HS: <3 NG/L (ref 0–13)
CARDIAC TROPONIN I PNL SERPL HS: <3 NG/L (ref 0–13)
CHLORIDE SERPL-SCNC: 103 MMOL/L (ref 98–107)
CO2 SERPL-SCNC: 28 MMOL/L (ref 21–32)
CREAT SERPL-MCNC: 1.04 MG/DL (ref 0.5–1.05)
EGFRCR SERPLBLD CKD-EPI 2021: 71 ML/MIN/1.73M*2
EOSINOPHIL # BLD AUTO: 0.15 X10*3/UL (ref 0–0.7)
EOSINOPHIL NFR BLD AUTO: 2.8 %
ERYTHROCYTE [DISTWIDTH] IN BLOOD BY AUTOMATED COUNT: 12.7 % (ref 11.5–14.5)
FLUAV RNA RESP QL NAA+PROBE: NOT DETECTED
FLUBV RNA RESP QL NAA+PROBE: NOT DETECTED
GLUCOSE SERPL-MCNC: 97 MG/DL (ref 74–99)
HCT VFR BLD AUTO: 37.4 % (ref 36–46)
HGB BLD-MCNC: 12 G/DL (ref 12–16)
HOLD SPECIMEN: NORMAL
HOLD SPECIMEN: NORMAL
IMM GRANULOCYTES # BLD AUTO: 0.03 X10*3/UL (ref 0–0.7)
IMM GRANULOCYTES NFR BLD AUTO: 0.6 % (ref 0–0.9)
LYMPHOCYTES # BLD AUTO: 1.57 X10*3/UL (ref 1.2–4.8)
LYMPHOCYTES NFR BLD AUTO: 28.9 %
MCH RBC QN AUTO: 28.6 PG (ref 26–34)
MCHC RBC AUTO-ENTMCNC: 32.1 G/DL (ref 32–36)
MCV RBC AUTO: 89 FL (ref 80–100)
MONOCYTES # BLD AUTO: 0.35 X10*3/UL (ref 0.1–1)
MONOCYTES NFR BLD AUTO: 6.4 %
NEUTROPHILS # BLD AUTO: 3.3 X10*3/UL (ref 1.2–7.7)
NEUTROPHILS NFR BLD AUTO: 60.6 %
NRBC BLD-RTO: 0 /100 WBCS (ref 0–0)
PLATELET # BLD AUTO: 300 X10*3/UL (ref 150–450)
POTASSIUM SERPL-SCNC: 3.4 MMOL/L (ref 3.5–5.3)
PROT SERPL-MCNC: 7.9 G/DL (ref 6.4–8.2)
RBC # BLD AUTO: 4.19 X10*6/UL (ref 4–5.2)
SARS-COV-2 RNA RESP QL NAA+PROBE: NOT DETECTED
SODIUM SERPL-SCNC: 138 MMOL/L (ref 136–145)
WBC # BLD AUTO: 5.4 X10*3/UL (ref 4.4–11.3)

## 2025-08-09 PROCEDURE — 71275 CT ANGIOGRAPHY CHEST: CPT | Performed by: RADIOLOGY

## 2025-08-09 PROCEDURE — 2500000004 HC RX 250 GENERAL PHARMACY W/ HCPCS (ALT 636 FOR OP/ED): Performed by: EMERGENCY MEDICINE

## 2025-08-09 PROCEDURE — 36415 COLL VENOUS BLD VENIPUNCTURE: CPT | Performed by: EMERGENCY MEDICINE

## 2025-08-09 PROCEDURE — 87636 SARSCOV2 & INF A&B AMP PRB: CPT | Performed by: EMERGENCY MEDICINE

## 2025-08-09 PROCEDURE — 71045 X-RAY EXAM CHEST 1 VIEW: CPT | Performed by: RADIOLOGY

## 2025-08-09 PROCEDURE — 84484 ASSAY OF TROPONIN QUANT: CPT | Performed by: EMERGENCY MEDICINE

## 2025-08-09 PROCEDURE — 2550000001 HC RX 255 CONTRASTS: Performed by: EMERGENCY MEDICINE

## 2025-08-09 PROCEDURE — 71275 CT ANGIOGRAPHY CHEST: CPT

## 2025-08-09 PROCEDURE — 93005 ELECTROCARDIOGRAM TRACING: CPT

## 2025-08-09 PROCEDURE — 84702 CHORIONIC GONADOTROPIN TEST: CPT | Performed by: EMERGENCY MEDICINE

## 2025-08-09 PROCEDURE — 2500000001 HC RX 250 WO HCPCS SELF ADMINISTERED DRUGS (ALT 637 FOR MEDICARE OP): Performed by: EMERGENCY MEDICINE

## 2025-08-09 PROCEDURE — 85025 COMPLETE CBC W/AUTO DIFF WBC: CPT | Performed by: EMERGENCY MEDICINE

## 2025-08-09 PROCEDURE — 71045 X-RAY EXAM CHEST 1 VIEW: CPT

## 2025-08-09 PROCEDURE — 99285 EMERGENCY DEPT VISIT HI MDM: CPT | Mod: 25 | Performed by: EMERGENCY MEDICINE

## 2025-08-09 PROCEDURE — 80053 COMPREHEN METABOLIC PANEL: CPT | Performed by: EMERGENCY MEDICINE

## 2025-08-09 PROCEDURE — 96374 THER/PROPH/DIAG INJ IV PUSH: CPT | Mod: 59

## 2025-08-09 RX ORDER — ACETAMINOPHEN 325 MG/1
975 TABLET ORAL ONCE
Status: COMPLETED | OUTPATIENT
Start: 2025-08-09 | End: 2025-08-09

## 2025-08-09 RX ORDER — KETOROLAC TROMETHAMINE 30 MG/ML
15 INJECTION, SOLUTION INTRAMUSCULAR; INTRAVENOUS ONCE
Status: COMPLETED | OUTPATIENT
Start: 2025-08-09 | End: 2025-08-09

## 2025-08-09 RX ADMIN — ACETAMINOPHEN 975 MG: 325 TABLET ORAL at 07:03

## 2025-08-09 RX ADMIN — KETOROLAC TROMETHAMINE 15 MG: 30 INJECTION, SOLUTION INTRAMUSCULAR at 07:03

## 2025-08-09 RX ADMIN — IOHEXOL 75 ML: 350 INJECTION, SOLUTION INTRAVENOUS at 07:36

## 2025-08-09 ASSESSMENT — PAIN - FUNCTIONAL ASSESSMENT: PAIN_FUNCTIONAL_ASSESSMENT: 0-10

## 2025-08-09 ASSESSMENT — PAIN SCALES - GENERAL
PAINLEVEL_OUTOF10: 0 - NO PAIN
PAINLEVEL_OUTOF10: 5 - MODERATE PAIN
PAINLEVEL_OUTOF10: 0 - NO PAIN

## 2025-08-09 ASSESSMENT — PAIN DESCRIPTION - LOCATION: LOCATION: CHEST

## 2025-08-09 ASSESSMENT — PAIN DESCRIPTION - ORIENTATION: ORIENTATION: LEFT;MID;UPPER

## 2025-08-09 NOTE — ED PROVIDER NOTES
HPI   Chief Complaint   Patient presents with    Chest Pain       The patient is a 35-year-old female past medical history of fibrosing mediastinitis, admission in May 2025 for right hilar mass with SVC occlusion presenting with chest pain.  Notes 3-4 days of sharp left upper chest pain, not precipitated or worsened with exertion.  Denies inciting trauma to the area.  Pain did not seem to be precipitated by any notable factor, notes it has been constant since onset.  Pain not worsened by notable factor.  Notes it is somewhat reproducible with palpation over the area.  Describes the pain as mild.  Has not been taking pain medications for this at home.  Denies any associated cough, dyspnea, denies any hemoptysis, extremity edema.  Denies any personal or family history of early onset cardiac disease.  Denies any fevers or chills.  Does note that she recently had been experiencing cold-like symptoms with congestion, mild cough, notes that the symptoms have largely resolved prior to onset of left-sided chest pain.              Patient History   Medical History[1]  Surgical History[2]  Family History[3]  Social History[4]    Physical Exam   ED Triage Vitals [08/09/25 0612]   Temperature Heart Rate Respirations BP   35.8 °C (96.5 °F) (!) 102 18 124/87      Pulse Ox Temp Source Heart Rate Source Patient Position   100 % Temporal -- --      BP Location FiO2 (%)     -- --       Physical Exam  Vitals and nursing note reviewed.   Constitutional:       General: She is not in acute distress.     Appearance: Normal appearance. She is not ill-appearing or toxic-appearing.   HENT:      Head: Normocephalic and atraumatic.      Nose: No congestion or rhinorrhea.      Mouth/Throat:      Mouth: Mucous membranes are moist.      Pharynx: Oropharynx is clear. No oropharyngeal exudate or posterior oropharyngeal erythema.     Eyes:      Extraocular Movements: Extraocular movements intact.      Right eye: Normal extraocular motion.      Left  eye: Normal extraocular motion.      Conjunctiva/sclera: Conjunctivae normal.      Pupils: Pupils are equal, round, and reactive to light.       Cardiovascular:      Rate and Rhythm: Normal rate and regular rhythm.      Pulses: Normal pulses.           Radial pulses are 2+ on the right side and 2+ on the left side.        Dorsalis pedis pulses are 2+ on the right side and 2+ on the left side.      Heart sounds: Normal heart sounds, S1 normal and S2 normal. No murmur heard.     No friction rub. No gallop.   Pulmonary:      Effort: Pulmonary effort is normal. No respiratory distress.      Breath sounds: Normal breath sounds. No stridor. No wheezing, rhonchi or rales.   Chest:     Abdominal:      General: Abdomen is flat. Bowel sounds are normal. There is no distension.      Palpations: Abdomen is soft.      Tenderness: There is no abdominal tenderness. There is no right CVA tenderness, left CVA tenderness, guarding or rebound.     Musculoskeletal:      Cervical back: Full passive range of motion without pain.      Right lower leg: No edema.      Left lower leg: No edema.     Skin:     General: Skin is warm and dry.     Neurological:      General: No focal deficit present.      Mental Status: She is alert and oriented to person, place, and time.      GCS: GCS eye subscore is 4. GCS verbal subscore is 5. GCS motor subscore is 6.      Cranial Nerves: No cranial nerve deficit.      Sensory: Sensation is intact. No sensory deficit.      Motor: Motor function is intact. No weakness, tremor or abnormal muscle tone.     Psychiatric:         Mood and Affect: Mood normal.           ED Course & MDM   ED Course as of 08/09/25 1109   Sat Aug 09, 2025   1059 Discussed case with thoracic surgery via the transfer center, they feel that there is no need for urgent surgical intervention at this time as mediastinal mass is largely unchanged and she is having no new symptoms consistent with SVC syndrome, did discuss the case with  pulmonology as well as patient follows with them as an outpatient, they also conveyed no indication for acute intervention/transfer from their perspective at this time. [BR]      ED Course User Index  [BR] Ilya Garcias MD                 No data recorded     Joan Coma Scale Score: 15 (08/09/25 0612 : Sarahi Seaman RN)                           Medical Decision Making  ECG interpreted by me: Sinus rhythm, rate 106, QRS 66, QTc 459.  No ST changes meeting STEMI criteria, no evidence of dysrhythmia/preexcitation syndrome.     Patient presenting with left-sided chest pain, sharp in nature, somewhat reproducible on examination.  Seems more consistent with musculoskeletal source of pain however patient is mildly tachycardic therefore cannot rule out PE by PERC criteria, patient also with history of mediastinal mass/fibrosing mediastinitis, therefore will assess with CT angiography of the chest to assess for compression of mediastinal structures from known mass as well as pulmonary embolism, will obtain chest x-ray to assess for pneumonia/pneumothorax (although seems less consistent with physical exam) given recent viral type syndrome will also obtain troponin, ECG to assess for pericarditis/myocarditis.  Will treat with IV analgesics.  Disposition pending labs, imaging results, reassessment.        CT scan shows no evidence of PE, serial troponins unremarkable, CT scan did show redemonstration of right perihilar mass, with mass effect on SVC/right pulmonary arteries, patient continues to saturate well on room air notes symptoms of largely improved after analgesic administration.  Discussed the case with both thoracic surgery as well as pulmonology via the transfer center who felt that patient's imaging appears stable and feel that patient is appropriate for close outpatient follow-up with her primary pulmonologist (patient conveys that she follows with [facility], Dr. Weston who she will be able to contact this week) Novofil  patient does not merit transfer/urgent intervention at this time.  Patient is amenable to this plan, she feels comfortable discharge at this time, continues to demonstrate reassuring vital signs currently and no physical exam findings consistent with developing SVC syndrome, patient discharged in good condition with plan for close outpatient follow-up, discussed return precautions at length with patient prior to discharge        Procedure  Procedures         [1]   Past Medical History:  Diagnosis Date    Blepharochalasis left eye, unspecified eyelid 11/19/2018    Blepharochalasis of left eye    Blepharochalasis left upper eyelid 04/07/2017    Blepharochalasis left upper eyelid    Body mass index (BMI) 38.0-38.9, adult 12/29/2016    BMI 38.0-38.9,adult    Chronic sinusitis, unspecified 12/18/2017    Other sinusitis    Edema of left upper eyelid 04/07/2017    Edema of left upper eyelid    Encounter for gynecological examination (general) (routine) without abnormal findings 05/18/2017    Encounter for annual routine gynecological examination    Encounter for routine postpartum follow-up 02/07/2017    Postpartum care following vaginal delivery    Essential (primary) hypertension 02/07/2017    Benign essential hypertension    Hypertension     Impacted cerumen, left ear 10/13/2017    Impacted cerumen of left ear    Localized edema 10/30/2014    Periorbital edema    Low back pain, unspecified 04/18/2018    Dorsalgia of lumbar region    Obstructive sleep apnea (adult) (pediatric) 10/01/2019    Obstructive sleep apnea of adult    Obstructive sleep apnea (adult) (pediatric) 12/04/2014    Obstructive sleep apnea    Other conditions influencing health status     Excessive mucus secretion    Other conditions influencing health status     History of recent childbirth    Personal history of other diseases of the circulatory system 10/28/2015    History of cardiac murmur    Personal history of other diseases of the nervous system  and sense organs 04/07/2017    History of papilledema    Personal history of other diseases of the nervous system and sense organs 09/23/2015    History of papilledema    Personal history of other diseases of the respiratory system 06/02/2020    History of allergic rhinitis    Personal history of other endocrine, nutritional and metabolic disease 10/30/2015    History of hypokalemia    Personal history of other specified conditions 08/01/2016    History of headache    Personal history of other specified conditions 04/07/2017    History of headache    Personal history of other specified conditions 05/18/2017    History of abdominal pain    Polyp of cervix uteri 03/17/2016    Cervical polyp    Pure hypercholesterolemia, unspecified 02/20/2015    Low-density-lipoid-type (LDL) hyperlipoproteinemia    Scotoma of blind spot area, left eye 04/07/2017    Enlarged blind spot of left eye    Sleep apnea     Supervision of pregnancy with other poor reproductive or obstetric history, first trimester ([facility]) 12/29/2016    History of pre-eclampsia in prior pregnancy, currently pregnant, first trimester    Unspecified acute lower respiratory infection 12/18/2017    Lower respiratory infection (e.g., bronchitis, pneumonia, pneumonitis, pulmonitis)    Unspecified asthma, uncomplicated ([facility]) 06/02/2020    Asthmatic bronchitis    Unspecified visual disturbance 04/07/2017    Transient vision disturbance   [2]   Past Surgical History:  Procedure Laterality Date    OTHER SURGICAL HISTORY  02/07/2017    Tubal Ligation After Vaginal Delivery (Same Hospitalization)   [3]   Family History  Problem Relation Name Age of Onset    Ovarian cancer Mother      Diabetes Father      Hypertension Father      Diabetes Other grandparent     Stroke Other grandparent     Breast cancer Neg Hx      Colon cancer Neg Hx     [4]   Social History  Tobacco Use    Smoking status: Never     Passive exposure: Never    Smokeless tobacco: Never   Vaping Use     Vaping status: Never Used   Substance Use Topics    Alcohol use: Yes     Comment: Socially    Drug use: Never        Ilya Garcias MD  08/09/25 0765

## 2025-08-09 NOTE — ED TRIAGE NOTES
Pt presents to ED from home with c/o chest pain. Pt noticed it about 3-4 days ago and states that she is just worried about it. Admitted a few months ago for lung mass that was infectious. Getting over a cold. Denies SOB at this time.

## 2025-08-11 LAB
ATRIAL RATE: 106 BPM
HOLD SPECIMEN: NORMAL
HOLD SPECIMEN: NORMAL
P AXIS: 63 DEGREES
P OFFSET: 199 MS
P ONSET: 146 MS
PR INTERVAL: 160 MS
Q ONSET: 226 MS
QRS COUNT: 18 BEATS
QRS DURATION: 66 MS
QT INTERVAL: 346 MS
QTC CALCULATION(BAZETT): 459 MS
QTC FREDERICIA: 418 MS
R AXIS: 78 DEGREES
T AXIS: 54 DEGREES
T OFFSET: 399 MS
VENTRICULAR RATE: 106 BPM

## 2025-08-14 ENCOUNTER — OFFICE VISIT (OUTPATIENT)
Dept: OBSTETRICS AND GYNECOLOGY | Facility: CLINIC | Age: 35
End: 2025-08-14
Payer: COMMERCIAL

## 2025-08-14 VITALS
WEIGHT: 202 LBS | BODY MASS INDEX: 29.83 KG/M2 | DIASTOLIC BLOOD PRESSURE: 89 MMHG | SYSTOLIC BLOOD PRESSURE: 134 MMHG | HEART RATE: 80 BPM

## 2025-08-14 DIAGNOSIS — N89.8 VAGINAL IRRITATION: ICD-10-CM

## 2025-08-14 DIAGNOSIS — N76.0 BACTERIAL VAGINOSIS: ICD-10-CM

## 2025-08-14 DIAGNOSIS — Z12.4 CERVICAL CANCER SCREENING: Primary | ICD-10-CM

## 2025-08-14 DIAGNOSIS — B96.89 BACTERIAL VAGINOSIS: ICD-10-CM

## 2025-08-14 DIAGNOSIS — Z11.3 ROUTINE SCREENING FOR STI (SEXUALLY TRANSMITTED INFECTION): ICD-10-CM

## 2025-08-14 DIAGNOSIS — N63.21 BREAST LUMP ON LEFT SIDE AT 1 O'CLOCK POSITION: ICD-10-CM

## 2025-08-14 PROCEDURE — 99395 PREV VISIT EST AGE 18-39: CPT

## 2025-08-14 PROCEDURE — 3079F DIAST BP 80-89 MM HG: CPT

## 2025-08-14 PROCEDURE — 3075F SYST BP GE 130 - 139MM HG: CPT

## 2025-08-14 PROCEDURE — 87491 CHLMYD TRACH DNA AMP PROBE: CPT

## 2025-08-14 PROCEDURE — 87661 TRICHOMONAS VAGINALIS AMPLIF: CPT

## 2025-08-14 PROCEDURE — 99213 OFFICE O/P EST LOW 20 MIN: CPT

## 2025-08-14 SDOH — ECONOMIC STABILITY: TRANSPORTATION INSECURITY
IN THE PAST 12 MONTHS, HAS THE LACK OF TRANSPORTATION KEPT YOU FROM MEDICAL APPOINTMENTS OR FROM GETTING MEDICATIONS?: NO

## 2025-08-14 SDOH — ECONOMIC STABILITY: FOOD INSECURITY: WITHIN THE PAST 12 MONTHS, YOU WORRIED THAT YOUR FOOD WOULD RUN OUT BEFORE YOU GOT MONEY TO BUY MORE.: NEVER TRUE

## 2025-08-14 SDOH — ECONOMIC STABILITY: FOOD INSECURITY: WITHIN THE PAST 12 MONTHS, THE FOOD YOU BOUGHT JUST DIDN'T LAST AND YOU DIDN'T HAVE MONEY TO GET MORE.: NEVER TRUE

## 2025-08-14 SDOH — ECONOMIC STABILITY: INCOME INSECURITY: IN THE LAST 12 MONTHS, WAS THERE A TIME WHEN YOU WERE NOT ABLE TO PAY THE MORTGAGE OR RENT ON TIME?: NO

## 2025-08-14 SDOH — ECONOMIC STABILITY: TRANSPORTATION INSECURITY
IN THE PAST 12 MONTHS, HAS LACK OF TRANSPORTATION KEPT YOU FROM MEETINGS, WORK, OR FROM GETTING THINGS NEEDED FOR DAILY LIVING?: NO

## 2025-08-14 ASSESSMENT — PATIENT HEALTH QUESTIONNAIRE - PHQ9
2. FEELING DOWN, DEPRESSED OR HOPELESS: NOT AT ALL
SUM OF ALL RESPONSES TO PHQ9 QUESTIONS 1 & 2: 0
2. FEELING DOWN, DEPRESSED OR HOPELESS: NOT AT ALL
SUM OF ALL RESPONSES TO PHQ9 QUESTIONS 1 AND 2: 0
1. LITTLE INTEREST OR PLEASURE IN DOING THINGS: NOT AT ALL
1. LITTLE INTEREST OR PLEASURE IN DOING THINGS: NOT AT ALL

## 2025-08-14 ASSESSMENT — COLUMBIA-SUICIDE SEVERITY RATING SCALE - C-SSRS
1. IN THE PAST MONTH, HAVE YOU WISHED YOU WERE DEAD OR WISHED YOU COULD GO TO SLEEP AND NOT WAKE UP?: NO
6. HAVE YOU EVER DONE ANYTHING, STARTED TO DO ANYTHING, OR PREPARED TO DO ANYTHING TO END YOUR LIFE?: NO
2. HAVE YOU ACTUALLY HAD ANY THOUGHTS OF KILLING YOURSELF?: NO

## 2025-08-14 ASSESSMENT — SOCIAL DETERMINANTS OF HEALTH (SDOH)
HOW HARD IS IT FOR YOU TO PAY FOR THE VERY BASICS LIKE FOOD, HOUSING, MEDICAL CARE, AND HEATING?: NOT VERY HARD
WITHIN THE LAST YEAR, HAVE YOU BEEN KICKED, HIT, SLAPPED, OR OTHERWISE PHYSICALLY HURT BY YOUR PARTNER OR EX-PARTNER?: NO
WITHIN THE LAST YEAR, HAVE YOU BEEN HUMILIATED OR EMOTIONALLY ABUSED IN OTHER WAYS BY YOUR PARTNER OR EX-PARTNER?: NO
WITHIN THE LAST YEAR, HAVE YOU BEEN AFRAID OF YOUR PARTNER OR EX-PARTNER?: NO
IN THE PAST 12 MONTHS, HAS THE ELECTRIC, GAS, OIL, OR WATER COMPANY THREATENED TO SHUT OFF SERVICE IN YOUR HOME?: NO
WITHIN THE LAST YEAR, HAVE TO BEEN RAPED OR FORCED TO HAVE ANY KIND OF SEXUAL ACTIVITY BY YOUR PARTNER OR EX-PARTNER?: NO

## 2025-08-14 ASSESSMENT — LIFESTYLE VARIABLES
HOW OFTEN DO YOU HAVE SIX OR MORE DRINKS ON ONE OCCASION: NEVER
AUDIT-C TOTAL SCORE: 1
HOW OFTEN DO YOU HAVE A DRINK CONTAINING ALCOHOL: MONTHLY OR LESS
HOW MANY STANDARD DRINKS CONTAINING ALCOHOL DO YOU HAVE ON A TYPICAL DAY: 1 OR 2
SKIP TO QUESTIONS 9-10: 1

## 2025-08-14 ASSESSMENT — PAIN SCALES - GENERAL: PAINLEVEL_OUTOF10: 0-NO PAIN

## 2025-08-15 LAB
BV SCORE VAG QL: NORMAL
C TRACH RRNA SPEC QL NAA+PROBE: NEGATIVE
N GONORRHOEA DNA SPEC QL PROBE+SIG AMP: NEGATIVE
T VAGINALIS RRNA SPEC QL NAA+PROBE: NEGATIVE

## 2025-08-15 RX ORDER — METRONIDAZOLE 500 MG/1
500 TABLET ORAL 2 TIMES DAILY
Qty: 14 TABLET | Refills: 0 | Status: SHIPPED | OUTPATIENT
Start: 2025-08-15 | End: 2025-08-22

## 2025-08-20 ENCOUNTER — HOSPITAL ENCOUNTER (OUTPATIENT)
Dept: RADIOLOGY | Facility: CLINIC | Age: 35
Discharge: HOME | End: 2025-08-20
Payer: COMMERCIAL

## 2025-08-20 DIAGNOSIS — R92.8 OTHER ABNORMAL AND INCONCLUSIVE FINDINGS ON DIAGNOSTIC IMAGING OF BREAST: ICD-10-CM

## 2025-08-20 DIAGNOSIS — N63.21 BREAST LUMP ON LEFT SIDE AT 1 O'CLOCK POSITION: ICD-10-CM

## 2025-08-20 PROCEDURE — 76642 ULTRASOUND BREAST LIMITED: CPT

## 2025-08-20 PROCEDURE — 77066 DX MAMMO INCL CAD BI: CPT | Performed by: STUDENT IN AN ORGANIZED HEALTH CARE EDUCATION/TRAINING PROGRAM

## 2025-08-20 PROCEDURE — 77062 BREAST TOMOSYNTHESIS BI: CPT | Performed by: STUDENT IN AN ORGANIZED HEALTH CARE EDUCATION/TRAINING PROGRAM

## 2025-08-20 PROCEDURE — 76982 USE 1ST TARGET LESION: CPT

## 2025-08-20 PROCEDURE — 77062 BREAST TOMOSYNTHESIS BI: CPT

## 2025-08-20 PROCEDURE — 76642 ULTRASOUND BREAST LIMITED: CPT | Performed by: STUDENT IN AN ORGANIZED HEALTH CARE EDUCATION/TRAINING PROGRAM

## 2025-08-27 LAB
CYTOLOGY CMNT CVX/VAG CYTO-IMP: NORMAL
HPV HR 12 DNA GENITAL QL NAA+PROBE: POSITIVE
HPV HR GENOTYPES PNL CVX NAA+PROBE: POSITIVE
HPV16 DNA SPEC QL NAA+PROBE: NEGATIVE
HPV18 DNA SPEC QL NAA+PROBE: NEGATIVE
LAB AP HPV GENOTYPE QUESTION: YES
LAB AP HPV HR: NORMAL
LAB AP PAP ADDITIONAL TESTS: NORMAL
LAB AP PREVIOUS ABNORMAL HISTORY: NORMAL
LABORATORY COMMENT REPORT: NORMAL
LABORATORY COMMENT REPORT: NORMAL
PATH REPORT.TOTAL CANCER: NORMAL